# Patient Record
Sex: FEMALE | Race: WHITE | NOT HISPANIC OR LATINO | Employment: OTHER | URBAN - METROPOLITAN AREA
[De-identification: names, ages, dates, MRNs, and addresses within clinical notes are randomized per-mention and may not be internally consistent; named-entity substitution may affect disease eponyms.]

---

## 2017-03-17 RX ORDER — TIZANIDINE 2 MG/1
2-4 TABLET ORAL EVERY 6 HOURS
COMMUNITY
Start: 2017-01-24 | End: 2017-07-26 | Stop reason: HOSPADM

## 2017-03-17 RX ORDER — SUMATRIPTAN 50 MG/1
50 TABLET, FILM COATED ORAL
COMMUNITY
Start: 2017-01-24 | End: 2018-01-24

## 2017-03-17 RX ORDER — ONDANSETRON 4 MG/1
4 TABLET, ORALLY DISINTEGRATING ORAL EVERY 8 HOURS
COMMUNITY
Start: 2017-01-24 | End: 2019-09-12 | Stop reason: SDUPTHER

## 2017-03-17 RX ORDER — LEVOTHYROXINE SODIUM 0.07 MG/1
75 TABLET ORAL
COMMUNITY
Start: 2017-01-20 | End: 2019-02-14 | Stop reason: SDUPTHER

## 2017-03-17 RX ORDER — DEXTROAMPHETAMINE SACCHARATE, AMPHETAMINE ASPARTATE, DEXTROAMPHETAMINE SULFATE AND AMPHETAMINE SULFATE 7.5; 7.5; 7.5; 7.5 MG/1; MG/1; MG/1; MG/1
30 TABLET ORAL 2 TIMES DAILY
COMMUNITY
Start: 2017-02-21 | End: 2018-05-14 | Stop reason: SDUPTHER

## 2017-03-17 RX ORDER — CLONAZEPAM 1 MG/1
1 TABLET ORAL 3 TIMES DAILY
COMMUNITY
Start: 2017-02-21 | End: 2018-05-14 | Stop reason: SDUPTHER

## 2017-03-21 ENCOUNTER — OFFICE VISIT (OUTPATIENT)
Dept: NEUROSURGERY | Facility: CLINIC | Age: 32
End: 2017-03-21

## 2017-03-21 VITALS
SYSTOLIC BLOOD PRESSURE: 97 MMHG | DIASTOLIC BLOOD PRESSURE: 62 MMHG | BODY MASS INDEX: 24.8 KG/M2 | HEART RATE: 62 BPM | WEIGHT: 158 LBS | HEIGHT: 67 IN

## 2017-03-21 DIAGNOSIS — D32.0 BENIGN NEOPLASM OF CEREBRAL MENINGES (HCC): Primary | ICD-10-CM

## 2017-03-21 PROCEDURE — 99203 OFFICE O/P NEW LOW 30 MIN: CPT | Performed by: NEUROLOGICAL SURGERY

## 2017-03-21 RX ORDER — TOPIRAMATE 50 MG/1
TABLET, FILM COATED ORAL
Refills: 3 | COMMUNITY
Start: 2017-02-17 | End: 2019-09-17 | Stop reason: ALTCHOICE

## 2017-03-21 RX ORDER — METRONIDAZOLE 500 MG/1
TABLET ORAL
Refills: 0 | COMMUNITY
Start: 2017-01-26 | End: 2017-03-21 | Stop reason: ALTCHOICE

## 2017-03-21 NOTE — PROGRESS NOTES
Subjective   Patient ID: Desiree Peterson is a 31 y.o. female is being seen for consultation today at the request of Melani Pruitt,* for headaches and seizures.    History of Present Illness     This patient has a long history of headaches.  She was thought to be having migraines and as a result had some questionable seizures toward the end of January.  At that time she had an MRI of her brain.  Subsequent to that she has been on Topamax taking it just once a day and that has helped a lot.  Currently she has no real complaints at all except for some mild headaches.    The following portions of the patient's history were reviewed and updated as appropriate: allergies, current medications, past family history, past medical history, past social history, past surgical history and problem list.    Review of Systems   Constitutional: Positive for fatigue.   HENT: Positive for sinus pressure.    Respiratory: Negative for chest tightness and shortness of breath.    Cardiovascular: Negative for chest pain.   Gastrointestinal: Positive for diarrhea and nausea.   Endocrine: Positive for cold intolerance, heat intolerance and polydipsia.   Musculoskeletal: Positive for myalgias, neck pain and neck stiffness.   Allergic/Immunologic: Positive for environmental allergies and immunocompromised state.   Neurological: Positive for seizures, weakness and numbness.        Tingling   Hematological: Positive for adenopathy.   Psychiatric/Behavioral: Positive for confusion, decreased concentration, dysphoric mood and sleep disturbance. The patient is nervous/anxious.    All other systems reviewed and are negative.      Objective   Physical Exam   Constitutional: She is oriented to person, place, and time. She appears well-developed and well-nourished.   HENT:   Head: Normocephalic and atraumatic.   Eyes: Conjunctivae and EOM are normal. Pupils are equal, round, and reactive to light.   Fundoscopic exam:       The right eye  shows no papilledema. The right eye shows venous pulsations.        The left eye shows no papilledema. The left eye shows venous pulsations.   Neck: Carotid bruit is not present.   Neurological: She is oriented to person, place, and time. She has a normal Finger-Nose-Finger Test and a normal Heel to Shin Test. Gait normal.   Reflex Scores:       Tricep reflexes are 2+ on the right side and 2+ on the left side.       Bicep reflexes are 2+ on the right side and 2+ on the left side.       Brachioradialis reflexes are 2+ on the right side and 2+ on the left side.       Patellar reflexes are 2+ on the right side and 2+ on the left side.       Achilles reflexes are 2+ on the right side and 2+ on the left side.  Psychiatric: Her speech is normal.     Neurologic Exam     Mental Status   Oriented to person, place, and time.   Registration of memory: Good recent and remote memory.   Attention: normal. Concentration: normal.   Speech: speech is normal   Level of consciousness: alert  Knowledge: consistent with education.     Cranial Nerves     CN II   Visual fields full to confrontation.   Visual acuity: normal    CN III, IV, VI   Pupils are equal, round, and reactive to light.  Extraocular motions are normal.     CN V   Facial sensation intact.   Right corneal reflex: normal  Left corneal reflex: normal    CN VII   Facial expression full, symmetric.   Right facial weakness: none  Left facial weakness: none    CN VIII   Hearing: intact    CN IX, X   Palate: symmetric    CN XI   Right sternocleidomastoid strength: normal  Left sternocleidomastoid strength: normal    CN XII   Tongue: not atrophic  Tongue deviation: none    Motor Exam   Muscle bulk: normal  Right arm tone: normal  Left arm tone: normal  Right leg tone: normal  Left leg tone: normal    Strength   Strength 5/5 except as noted.     Sensory Exam   Light touch normal.     Gait, Coordination, and Reflexes     Gait  Gait: normal    Coordination   Finger to nose  coordination: normal  Heel to shin coordination: normal    Reflexes   Right brachioradialis: 2+  Left brachioradialis: 2+  Right biceps: 2+  Left biceps: 2+  Right triceps: 2+  Left triceps: 2+  Right patellar: 2+  Left patellar: 2+  Right achilles: 2+  Left achilles: 2+  Right : 2+  Left : 2+      Assessment/Plan   Independent Review of Radiographic Studies:      I reviewed an MRI of her brain done in January of this year myself.  This was done at [facility].  This does show a very small meningioma in the right lateral sphenoid wing.    Medical Decision Making:      I told the patient and her family about the MRI.  I suggested that we do a follow-up scan but I would not recommend anything else at this point.  The tumor is simply too small to consider any type of surgery.    Desiree was seen today for headache.    Diagnoses and all orders for this visit:    Benign neoplasm of cerebral meninges  -     MRI Brain With & Without Contrast; Future    Return for After radiology test.

## 2017-07-21 ENCOUNTER — HOSPITAL ENCOUNTER (OUTPATIENT)
Dept: MRI IMAGING | Facility: HOSPITAL | Age: 32
Discharge: HOME | End: 2017-07-21
Attending: NEUROLOGICAL SURGERY

## 2017-07-26 ENCOUNTER — HOSPITAL ENCOUNTER (OUTPATIENT)
Dept: MRI IMAGING | Facility: HOSPITAL | Age: 32
Discharge: HOME | End: 2017-07-26
Attending: NEUROLOGICAL SURGERY | Admitting: NEUROLOGICAL SURGERY

## 2017-07-26 ENCOUNTER — OFFICE VISIT (OUTPATIENT)
Dept: NEUROSURGERY | Facility: CLINIC | Age: 32
End: 2017-07-26

## 2017-07-26 VITALS
HEIGHT: 67 IN | DIASTOLIC BLOOD PRESSURE: 78 MMHG | HEART RATE: 77 BPM | WEIGHT: 150 LBS | SYSTOLIC BLOOD PRESSURE: 124 MMHG | BODY MASS INDEX: 23.54 KG/M2

## 2017-07-26 DIAGNOSIS — D32.0 BENIGN NEOPLASM OF CEREBRAL MENINGES (HCC): Primary | ICD-10-CM

## 2017-07-26 DIAGNOSIS — D32.0 BENIGN NEOPLASM OF CEREBRAL MENINGES (HCC): ICD-10-CM

## 2017-07-26 PROCEDURE — 99213 OFFICE O/P EST LOW 20 MIN: CPT | Performed by: NEUROLOGICAL SURGERY

## 2017-07-26 PROCEDURE — A9577 INJ MULTIHANCE: HCPCS | Performed by: NEUROLOGICAL SURGERY

## 2017-07-26 PROCEDURE — 0 GADOBENATE DIMEGLUMINE 529 MG/ML SOLUTION: Performed by: NEUROLOGICAL SURGERY

## 2017-07-26 PROCEDURE — 70553 MRI BRAIN STEM W/O & W/DYE: CPT

## 2017-07-26 RX ADMIN — GADOBENATE DIMEGLUMINE 13 ML: 529 INJECTION, SOLUTION INTRAVENOUS at 10:00

## 2017-07-26 NOTE — PROGRESS NOTES
Subjective   Patient ID: Desiree Peterson is a 31 y.o. female is here today for follow-up after brain MRI.     History of Present Illness    This patient returns today.  She is having increasingly severe migraines.  Otherwise she has no change in symptoms.    The following portions of the patient's history were reviewed and updated as appropriate: allergies, current medications, past family history, past medical history, past social history, past surgical history and problem list.    Review of Systems   Respiratory: Negative for shortness of breath.    Cardiovascular: Negative for chest pain.   Neurological: Positive for light-headedness and headaches.   All other systems reviewed and are negative.      Objective   Physical Exam   Constitutional: She is oriented to person, place, and time. She appears well-developed and well-nourished.   Neurological: She is oriented to person, place, and time.     Neurologic Exam     Mental Status   Oriented to person, place, and time.       Assessment/Plan   Independent Review of Radiographic Studies:      I reviewed her MRI that was done earlier today and I also talked to the radiologist.  This shows the tumor along the right lateral sphenoid wing.  It is still small and measures working or 15 mm.  Radiologist thought that it may have grown by millimeter or less it is not totally sure even about that    Medical Decision Making:      I told the patient that I thought it was safe to continue to image this every 6 months for right now.  I think we need to go again in about 6 months just to be sure there is no gross since was a question today.  If that looks okay then we might be able to stretch it out to once a year after that.    Desiree was seen today for headache.    Diagnoses and all orders for this visit:    Benign neoplasm of cerebral meninges  -     MRI Brain With & Without Contrast; Future    Return in about 6 months (around 1/26/2018).

## 2017-09-11 ENCOUNTER — APPOINTMENT (OUTPATIENT)
Dept: GENERAL RADIOLOGY | Facility: HOSPITAL | Age: 32
End: 2017-09-11

## 2017-09-11 ENCOUNTER — HOSPITAL ENCOUNTER (EMERGENCY)
Facility: HOSPITAL | Age: 32
Discharge: HOME | End: 2017-09-11
Attending: EMERGENCY MEDICINE | Admitting: EMERGENCY MEDICINE

## 2017-09-11 VITALS
HEART RATE: 57 BPM | HEIGHT: 67 IN | WEIGHT: 150 LBS | TEMPERATURE: 98 F | BODY MASS INDEX: 23.54 KG/M2 | OXYGEN SATURATION: 100 % | RESPIRATION RATE: 16 BRPM | SYSTOLIC BLOOD PRESSURE: 106 MMHG | DIASTOLIC BLOOD PRESSURE: 71 MMHG
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
## 2017-09-11 DIAGNOSIS — S20.229A BACK CONTUSION, UNSPECIFIED LATERALITY, INITIAL ENCOUNTER: ICD-10-CM

## 2017-09-11 LAB — HCG SERPL QL: NEGATIVE

## 2017-09-11 PROCEDURE — 72220 X-RAY EXAM SACRUM TAILBONE: CPT

## 2017-09-11 PROCEDURE — 84703 CHORIONIC GONADOTROPIN ASSAY: CPT | Performed by: EMERGENCY MEDICINE

## 2017-09-11 PROCEDURE — 99284 EMERGENCY DEPT VISIT MOD MDM: CPT

## 2017-09-11 PROCEDURE — 72072 X-RAY EXAM THORAC SPINE 3VWS: CPT

## 2017-09-11 PROCEDURE — 72110 X-RAY EXAM L-2 SPINE 4/>VWS: CPT

## 2017-09-11 PROCEDURE — 36415 COLL VENOUS BLD VENIPUNCTURE: CPT | Performed by: EMERGENCY MEDICINE

## 2017-09-11 RX ORDER — HYDROCODONE BITARTRATE AND ACETAMINOPHEN 7.5; 325 MG/1; MG/1
1 TABLET ORAL EVERY 6 HOURS PRN
Qty: 20 TABLET | Refills: 0 | Status: ON HOLD | OUTPATIENT
Start: 2017-09-11 | End: 2018-08-03

## 2017-09-11 RX ORDER — HYDROCODONE BITARTRATE AND ACETAMINOPHEN 7.5; 325 MG/1; MG/1
1 TABLET ORAL ONCE
Status: COMPLETED | OUTPATIENT
Start: 2017-09-11 | End: 2017-09-11

## 2017-09-11 RX ORDER — ONDANSETRON 4 MG/1
4 TABLET, ORALLY DISINTEGRATING ORAL EVERY 6 HOURS PRN
Status: DISCONTINUED | OUTPATIENT
Start: 2017-09-11 | End: 2017-09-11 | Stop reason: HOSPADM

## 2017-09-11 RX ORDER — VENLAFAXINE 37.5 MG/1
37.5 TABLET ORAL 2 TIMES DAILY
COMMUNITY
End: 2018-05-08 | Stop reason: SDUPTHER

## 2017-09-11 RX ADMIN — HYDROCODONE BITARTRATE AND ACETAMINOPHEN 1 TABLET: 7.5; 325 TABLET ORAL at 20:20

## 2017-09-11 RX ADMIN — ONDANSETRON 4 MG: 4 TABLET, ORALLY DISINTEGRATING ORAL at 20:20

## 2017-09-11 NOTE — ED PROVIDER NOTES
EMERGENCY DEPARTMENT ENCOUNTER    CHIEF COMPLAINT  Chief Complaint: Fall  History given by: Patient  History limited by: Nothing  Room Number: 01/01  PMD: Melani Pruitt MD      HPI:  Pt is a 31 y.o. female who presents to the ED after the patient slipped and fell down four stairs around 11:00AM and she reportedly landed on her lower back / tailbone upon impact. Patient reports that she simply lost her balance and denies experiencing any prodromal symptoms prior to the fall such as dizziness or lightheadedness. She also denies experiencing any head / neck trauma or LOC upon impact although she has developed mild cervical tenderness that has gradually worsened since sitting in the waiting room. Patient explains that the back pain is exacerbated with movement or sitting for long periods of time and alleviated with positional rest. Patient states that she's experienced mild tingling of her bilateral feet but she denies any nausea, vomiting, incontinence of bowel or bladder or focal weakness and she has no other complaints at this time.      Duration:  Seconds  Onset: Sudden  Timing: Constant  Location: Lumbar, coccyx  Radiation: None  Quality: Dull  Intensity/Severity: Moderate  Progression: No Change  Associated Symptoms: Back pain, neck pain, tingling BLE  Aggravating Factors: Movement, ambualting  Alleviating Factors: Rest  Previous Episodes: None  Treatment before arrival: None    PAST MEDICAL HISTORY  Active Ambulatory Problems     Diagnosis Date Noted   • Benign neoplasm of cerebral meninges 03/21/2017     Resolved Ambulatory Problems     Diagnosis Date Noted   • No Resolved Ambulatory Problems     Past Medical History:   Diagnosis Date   • Atrial flutter    • Cyst, sinus nasal    • Heart murmur    • Meningioma    • Migraines    • POTS (postural orthostatic tachycardia syndrome)    • Seizures        PAST SURGICAL HISTORY  Past Surgical History:   Procedure Laterality Date   • FINGER SURGERY      Cyst  removal       FAMILY HISTORY  Family History   Problem Relation Age of Onset   • No Known Problems Mother    • No Known Problems Father        SOCIAL HISTORY  Social History     Social History   • Marital status: Single     Spouse name: N/A   • Number of children: 1   • Years of education: College     Occupational History   • Unemployed      Social History Main Topics   • Smoking status: Former Smoker   • Smokeless tobacco: Not on file   • Alcohol use No   • Drug use: No   • Sexual activity: Defer     Other Topics Concern   • Not on file     Social History Narrative       ALLERGIES  Cefdinir and Penicillins    REVIEW OF SYSTEMS  Review of Systems   Constitutional: Negative for fever.   Respiratory: Negative for shortness of breath.    Cardiovascular: Negative for chest pain.   Musculoskeletal: Positive for back pain and neck pain.   Neurological:        Tingling BLE       PHYSICAL EXAM  ED Triage Vitals   Temp Heart Rate Resp BP SpO2   09/11/17 1512 09/11/17 1512 09/11/17 1512 09/11/17 1527 09/11/17 1512   97.5 °F (36.4 °C) 86 16 120/64 100 %      Temp src Heart Rate Source Patient Position BP Location FiO2 (%)   09/11/17 2001 09/11/17 2001 09/11/17 2001 09/11/17 2001 --   Oral Monitor Sitting Right arm        Physical Exam   Constitutional: She is oriented to person, place, and time. No distress.   HENT:   Head: Normocephalic and atraumatic.   Cardiovascular: Normal rate and regular rhythm.    Pulmonary/Chest: Effort normal. No respiratory distress.   Abdominal: There is no tenderness.   Musculoskeletal: She exhibits no tenderness.   Abrasions right lumbar and thoracic para-spinous areas. No thoracic vertebral tenderness.  Coccygeal tenderness noted on exam.    Neurological: She is alert and oriented to person, place, and time. She has normal reflexes. She exhibits normal muscle tone.   Skin: No rash noted.   Nursing note and vitals reviewed.      LAB RESULTS  Lab Results (last 24 hours)     Procedure Component  Value Units Date/Time    hCG, Serum, Qualitative [[id number]]  (Normal) Collected:  09/11/17 1550    Specimen:  Blood Updated:  09/11/17 1624     HCG Qualitative Negative          I ordered the above labs and reviewed the results    RADIOLOGY  XR Spine Thoracic 3 View   Final Result      XR Spine Lumbar 4+ View   Final Result      XR Sacrum & Coccyx   Final Result        2015  Reviewed XR T&L spine and Sacrum / Coccyx - Coccygeal deformity probably due to old injury.  Normal lumbar spine series. Mild thoracic scoliosis, mild compression of T8 question age. Independently viewed by me. Interpreted by radiologist.     I ordered the above noted radiological studies. Interpreted by radiologist. Reviewed by me in PACS.       PROCEDURES  Procedures      PROGRESS AND CONSULTS  ED Course     1513  Ordered hCG for further evaluation.     1900  Ordered XR T spine, Sacrum & Coccyx and L spine for further evaluation.     2037  Discussed case with  and he agrees with the treatment plan.     2038  Rechecked the patient and she is resting. Discussed the patient's pertinent labs and imaging results, including XR L spine shows nothing acute although there is an old appearing fracture of her tailbone and mild compression of T8. Discussed plan to discharge the patient home with a short course of Norco and recommended follow up with her PCP as directed. Patient agrees with the plan and all questions were addressed.     Latest vital signs   BP- 109/66 HR- 57 Temp- 98 °F (36.7 °C) (Oral) O2 sat- 100%      MEDICAL DECISION MAKING  Results were reviewed/discussed with the patient and they were also made aware of online access. Pt also made aware that some labs, such as cultures, will not be resulted during ER visit and follow up with PMD is necessary.     MDM  Number of Diagnoses or Management Options  Back contusion, unspecified laterality, initial encounter:   Fall, initial encounter:   Diagnosis management comments: Low suspicion  of compression fracture of T8.  No weakness, will have close follow-up.       Amount and/or Complexity of Data Reviewed  Clinical lab tests: reviewed and ordered (hCG negative)  Tests in the radiology section of CPT®: reviewed and ordered (XR T&L spine and Sacrum / Coccyx - Coccygeal deformity probably due to old injury.  Normal lumbar spine series. Mild thoracic scoliosis, mild compression of T8 question age.)    Patient Progress  Patient progress: stable         DIAGNOSIS  Final diagnoses:   Fall, initial encounter   Back contusion, unspecified laterality, initial encounter       DISPOSITION  DISCHARGE    Patient discharged in stable condition.    Reviewed implications of results, diagnosis, meds, responsibility to follow up, warning signs and symptoms of possible worsening, potential complications and reasons to return to ER, including worsening back pain.    Patient/Family voiced understanding of above instructions.    Discussed plan for discharge, as there is no emergent indication for admission.  Pt/family is agreeable and understands need for follow up and repeat testing.  Pt is aware that discharge does not mean that nothing is wrong but it indicates no emergency is present that requires admission and they must continue care with follow-up as given below or physician of their choice.     FOLLOW-UP  Melani Pruitt MD  [facility] [place] [id number] [phone]    In 1 week  for recheck of symptoms, if no improvement         Medication List      New Prescriptions          HYDROcodone-acetaminophen 7.5-325 MG per tablet   Commonly known as:  NORCO   Take 1 tablet by mouth Every 6 (Six) Hours As Needed for Severe Pain .           Latest Documented Vital Signs:  As of 8:53 PM  BP- 106/71 HR- 57 Temp- 98 °F (36.7 °C) (Oral) O2 sat- 100%    --  Documentation assistance provided by marcia Staley for Janak Pickering PA-C.  Information recorded by the marcia was done at my direction and has  been verified and validated by me.     Liam Staley  09/11/17 8011       AREN Garrido  09/11/17 5292

## 2017-09-12 NOTE — ED PROVIDER NOTES
I supervised care provided by the midlevel provider.    We have discussed this patient's history, physical exam, and treatment plan.   I have reviewed the note and personally saw and examined the patient and agree with the plan of care.    Pt with slip and fall on stairs at home. She reports pain of the mid-back, lumbar, and sacrum. Did not strike her head or have LOC.     On exam, there is mild scoliosis of the lower T-spine with abrasion to R of midline upper lumbar. No midline tenderness or deformity. No neuro deficit.    Imaging shows old coccygeal deformity, but no acute fx. Will discharge with pain medication. Instructed to alternate ice and heat therapy.    Imaging  XR Spine Thoracic 3 View   Final Result   XR Spine Lumbar 4+ View   Final Result   XR Sacrum & Coccyx   Final Result   Final result by Frankie Vanegas MD (09/11/17 20:10:45)     Narrative:     LUMBAR SPINE SERIES 5 VIEWS  SACRUM AND COCCYX 3 VIEWS  THORACIC SPINE SERIES 3 VIEWS     HISTORY: Emergency evaluation was performed in this 31-year-old female  who fell down a flight of stairs complains of thoracic and lumbar pain  and coccygeal pain. There is history of remote coccygeal fracture.     COMPARISON: None available     FINDINGS:  1. Coccygeal deformity probably due to old injury.  2. Normal lumbar spine series.  3. Mild thoracic scoliosis, mild compression of T8 question age.     MRI follow-up should be considered for more accurate assessment  chronology.     This report was finalized on 9/11/2017 8:10 PM by Dr. Frankie Vanegas MD.                    Documentation assistance provided by marcia Trinh for Dr. Salcido.  Information recorded by the scribe was done at my direction and has been verified and validated by me.         Andres Trinh  09/11/17 9600       Elvin Salcido MD  09/11/17 6850

## 2018-01-26 ENCOUNTER — HOSPITAL ENCOUNTER (OUTPATIENT)
Dept: MRI IMAGING | Facility: HOSPITAL | Age: 33
Discharge: HOME | End: 2018-01-26
Attending: NEUROLOGICAL SURGERY | Admitting: NEUROLOGICAL SURGERY

## 2018-01-26 DIAGNOSIS — D32.0 BENIGN NEOPLASM OF CEREBRAL MENINGES (HCC): ICD-10-CM

## 2018-01-26 PROCEDURE — A9577 INJ MULTIHANCE: HCPCS | Performed by: NEUROLOGICAL SURGERY

## 2018-01-26 PROCEDURE — 70553 MRI BRAIN STEM W/O & W/DYE: CPT

## 2018-01-26 PROCEDURE — 0 GADOBENATE DIMEGLUMINE 529 MG/ML SOLUTION: Performed by: NEUROLOGICAL SURGERY

## 2018-01-26 RX ADMIN — GADOBENATE DIMEGLUMINE 15 ML: 529 INJECTION, SOLUTION INTRAVENOUS at 13:24

## 2018-01-30 ENCOUNTER — TELEPHONE (OUTPATIENT)
Dept: NEUROSURGERY | Facility: CLINIC | Age: 33
End: 2018-01-30

## 2018-01-30 NOTE — TELEPHONE ENCOUNTER
Called and LVM for the patient to call the office as she had her MRI on 1/26/2018 however she cancelled her appointment for today via automated remind system.

## 2018-05-08 ENCOUNTER — TELEPHONE (OUTPATIENT)
Dept: FAMILY MEDICINE CLINIC | Facility: CLINIC | Age: 33
End: 2018-05-08

## 2018-05-08 RX ORDER — VENLAFAXINE 37.5 MG/1
37.5 TABLET ORAL DAILY
Qty: 30 TABLET | Refills: 6 | Status: SHIPPED | OUTPATIENT
Start: 2018-05-08 | End: 2018-12-03 | Stop reason: SDUPTHER

## 2018-05-08 NOTE — TELEPHONE ENCOUNTER
Patient informed she will have to be seen Monday before Adderall could be filled, may go to urgent care for sinuses if need now.

## 2018-05-08 NOTE — TELEPHONE ENCOUNTER
PT HAS AN APPOINTMENT WITH DR AGUILLON ON Monday, WHERE SHE WILL BE A NEW PT THOUGH SHE HAS SEEN HIM YEARS AGO.  SHE STATES THAT SHE HAS A SINUS INFECTION AND SHE HAS GONE A WHILE WITHOUT HER PRESCRIPTIONS FOR  ADDERALL, 30 MG  EFFEXOR, 37.5 Mg, 2 TIMES DAILY  AND SHE WAS ASKING IF EITHER DR AGUILLON COULD FILL THEM BEFORE SEEING HER, OR IF SHE COULD COME IN AND SEE SOMEONE ELSE BEFORE Monday, AND STILL HAVE DR AGUILLON AS HERE PCP AND SEE HIM ON Monday AS A NEW PATIENT?

## 2018-05-08 NOTE — TELEPHONE ENCOUNTER
Keep the appointment with me on Monday.  However, I cannot prescribe his medications until after you have been seen by me.

## 2018-05-14 ENCOUNTER — TELEPHONE (OUTPATIENT)
Dept: FAMILY MEDICINE CLINIC | Facility: CLINIC | Age: 33
End: 2018-05-14

## 2018-05-14 ENCOUNTER — OFFICE VISIT (OUTPATIENT)
Dept: FAMILY MEDICINE CLINIC | Facility: CLINIC | Age: 33
End: 2018-05-14

## 2018-05-14 VITALS
SYSTOLIC BLOOD PRESSURE: 104 MMHG | WEIGHT: 155 LBS | HEART RATE: 84 BPM | OXYGEN SATURATION: 100 % | HEIGHT: 67 IN | DIASTOLIC BLOOD PRESSURE: 60 MMHG | BODY MASS INDEX: 24.33 KG/M2 | TEMPERATURE: 98.4 F | RESPIRATION RATE: 18 BRPM

## 2018-05-14 DIAGNOSIS — E04.1 THYROID NODULE: ICD-10-CM

## 2018-05-14 DIAGNOSIS — D32.9 MENINGIOMA (HCC): ICD-10-CM

## 2018-05-14 DIAGNOSIS — F41.9 ANXIETY: ICD-10-CM

## 2018-05-14 DIAGNOSIS — F98.8 ATTENTION DEFICIT DISORDER, UNSPECIFIED HYPERACTIVITY PRESENCE: ICD-10-CM

## 2018-05-14 DIAGNOSIS — G40.909 SEIZURE DISORDER (HCC): ICD-10-CM

## 2018-05-14 DIAGNOSIS — E03.9 HYPOTHYROIDISM, UNSPECIFIED TYPE: Primary | ICD-10-CM

## 2018-05-14 PROCEDURE — 99204 OFFICE O/P NEW MOD 45 MIN: CPT | Performed by: FAMILY MEDICINE

## 2018-05-14 RX ORDER — DEXTROAMPHETAMINE SACCHARATE, AMPHETAMINE ASPARTATE, DEXTROAMPHETAMINE SULFATE AND AMPHETAMINE SULFATE 7.5; 7.5; 7.5; 7.5 MG/1; MG/1; MG/1; MG/1
30 TABLET ORAL 2 TIMES DAILY
Qty: 60 TABLET | Refills: 0 | Status: SHIPPED | OUTPATIENT
Start: 2018-05-14 | End: 2018-06-29 | Stop reason: SDUPTHER

## 2018-05-14 RX ORDER — CLONAZEPAM 1 MG/1
1 TABLET ORAL 3 TIMES DAILY PRN
Qty: 30 TABLET | Refills: 0 | Status: SHIPPED | OUTPATIENT
Start: 2018-05-14 | End: 2019-08-28 | Stop reason: SDUPTHER

## 2018-05-14 RX ORDER — DEXTROAMPHETAMINE SACCHARATE, AMPHETAMINE ASPARTATE, DEXTROAMPHETAMINE SULFATE AND AMPHETAMINE SULFATE 7.5; 7.5; 7.5; 7.5 MG/1; MG/1; MG/1; MG/1
30 TABLET ORAL 2 TIMES DAILY
Qty: 60 TABLET | Refills: 0 | Status: SHIPPED | OUTPATIENT
Start: 2018-05-14 | End: 2018-05-14 | Stop reason: SDUPTHER

## 2018-05-14 RX ORDER — TIZANIDINE 2 MG/1
2-4 TABLET ORAL
Status: ON HOLD | COMMUNITY
Start: 2017-12-08 | End: 2018-08-03 | Stop reason: SDDI

## 2018-05-14 NOTE — PROGRESS NOTES
SUBJECTIVE:  The patient is a 32-year-old white female is here for the first time in several years.  She had an insurance change and was seen another physician.  She is now able to come here.  She has a history of hypothyroidism and had lab recently by her previous physician.  There were in the normal range.  She is due an ultrasound.  She has a history of a meningioma which is being watched by neurosurgery.  She has ADD.  She has a history of seizures.    PAST MEDICAL HISTORY:  Reviewed.  Family history reviewed  Surgical history reviewed  She'll history reviewed    REVIEW OF SYSTEMS:  Please see above; 14 point ROS negative.      OBJECTIVE: Vitals signs are reviewed and are stable.    HEENT: PERRLA.   Neck:  Supple.   Lungs:  Clear.    Heart:  Regular rate and rhythm.   Abdomen:   Soft, nontender.   Extremities:  No cyanosis, clubbing or edema.     ASSESSMENT:  Meningioma  Seizure disorder  Hypothyroidism  Depression  Migraines  ADD      PLAN: Her medications will be refilled.  She will sign a contract.  An ultrasound of her thyroid is ordered.  She will follow up on these tests.  I will try to retrieve her labs from [facility].  He will call if problems.

## 2018-06-29 ENCOUNTER — TELEPHONE (OUTPATIENT)
Dept: FAMILY MEDICINE CLINIC | Facility: CLINIC | Age: 33
End: 2018-06-29

## 2018-06-29 RX ORDER — DEXTROAMPHETAMINE SACCHARATE, AMPHETAMINE ASPARTATE, DEXTROAMPHETAMINE SULFATE AND AMPHETAMINE SULFATE 7.5; 7.5; 7.5; 7.5 MG/1; MG/1; MG/1; MG/1
30 TABLET ORAL 2 TIMES DAILY
Qty: 60 TABLET | Refills: 0 | Status: SHIPPED | OUTPATIENT
Start: 2018-06-29 | End: 2018-06-29 | Stop reason: SDUPTHER

## 2018-06-29 RX ORDER — DEXTROAMPHETAMINE SACCHARATE, AMPHETAMINE ASPARTATE, DEXTROAMPHETAMINE SULFATE AND AMPHETAMINE SULFATE 7.5; 7.5; 7.5; 7.5 MG/1; MG/1; MG/1; MG/1
30 TABLET ORAL 2 TIMES DAILY
Qty: 60 TABLET | Refills: 0 | Status: SHIPPED | OUTPATIENT
Start: 2018-06-29 | End: 2018-07-03 | Stop reason: SDUPTHER

## 2018-07-03 ENCOUNTER — TELEPHONE (OUTPATIENT)
Dept: FAMILY MEDICINE CLINIC | Facility: CLINIC | Age: 33
End: 2018-07-03

## 2018-07-03 RX ORDER — DEXTROAMPHETAMINE SACCHARATE, AMPHETAMINE ASPARTATE, DEXTROAMPHETAMINE SULFATE AND AMPHETAMINE SULFATE 7.5; 7.5; 7.5; 7.5 MG/1; MG/1; MG/1; MG/1
30 TABLET ORAL 2 TIMES DAILY
Qty: 60 TABLET | Refills: 0 | Status: SHIPPED | OUTPATIENT
Start: 2018-07-03 | End: 2018-09-26 | Stop reason: SDUPTHER

## 2018-07-03 NOTE — TELEPHONE ENCOUNTER
Annette called from [facility] and said they don't have adderall 30 mg in stock, and wants to change it to 20 mg 1 1/2 tabs bid. Ok per Robin.

## 2018-08-01 ENCOUNTER — HOSPITAL ENCOUNTER (OUTPATIENT)
Facility: HOSPITAL | Age: 33
Setting detail: OBSERVATION
Discharge: HOME | End: 2018-08-03
Attending: EMERGENCY MEDICINE | Admitting: HOSPITALIST

## 2018-08-01 ENCOUNTER — APPOINTMENT (OUTPATIENT)
Dept: CT IMAGING | Facility: HOSPITAL | Age: 33
End: 2018-08-01

## 2018-08-01 DIAGNOSIS — G45.9 TRANSIENT CEREBRAL ISCHEMIA, UNSPECIFIED TYPE: Primary | ICD-10-CM

## 2018-08-01 LAB
ALBUMIN SERPL-MCNC: 4.8 G/DL (ref 3.5–5.2)
ALBUMIN/GLOB SERPL: 1.7 G/DL
ALP SERPL-CCNC: 58 U/L (ref 39–117)
ALT SERPL W P-5'-P-CCNC: 11 U/L (ref 1–33)
ANION GAP SERPL CALCULATED.3IONS-SCNC: 12.5 MMOL/L
AST SERPL-CCNC: 10 U/L (ref 1–32)
BASOPHILS # BLD AUTO: 0 10*3/MM3 (ref 0–0.2)
BASOPHILS NFR BLD AUTO: 0 % (ref 0–1.5)
BILIRUB SERPL-MCNC: 0.5 MG/DL (ref 0.1–1.2)
BUN BLD-MCNC: 9 MG/DL (ref 6–20)
BUN/CREAT SERPL: 10.8 (ref 7–25)
CALCIUM SPEC-SCNC: 9 MG/DL (ref 8.6–10.5)
CHLORIDE SERPL-SCNC: 103 MMOL/L (ref 98–107)
CO2 SERPL-SCNC: 23.5 MMOL/L (ref 22–29)
CREAT BLD-MCNC: 0.83 MG/DL (ref 0.57–1)
DEPRECATED RDW RBC AUTO: 39 FL (ref 37–54)
EOSINOPHIL # BLD AUTO: 0 10*3/MM3 (ref 0–0.7)
EOSINOPHIL NFR BLD AUTO: 0 % (ref 0.3–6.2)
ERYTHROCYTE [DISTWIDTH] IN BLOOD BY AUTOMATED COUNT: 12.8 % (ref 11.7–13)
GFR SERPL CREATININE-BSD FRML MDRD: 80 ML/MIN/1.73
GLOBULIN UR ELPH-MCNC: 2.9 GM/DL
GLUCOSE BLD-MCNC: 101 MG/DL (ref 65–99)
GLUCOSE BLDC GLUCOMTR-MCNC: 100 MG/DL (ref 70–130)
HCG SERPL QL: NEGATIVE
HCT VFR BLD AUTO: 41.4 % (ref 35.6–45.5)
HGB BLD-MCNC: 13.6 G/DL (ref 11.9–15.5)
HOLD SPECIMEN: NORMAL
HOLD SPECIMEN: NORMAL
IMM GRANULOCYTES # BLD: 0 10*3/MM3 (ref 0–0.03)
IMM GRANULOCYTES NFR BLD: 0 % (ref 0–0.5)
LYMPHOCYTES # BLD AUTO: 1.54 10*3/MM3 (ref 0.9–4.8)
LYMPHOCYTES NFR BLD AUTO: 31.6 % (ref 19.6–45.3)
MCH RBC QN AUTO: 27.9 PG (ref 26.9–32)
MCHC RBC AUTO-ENTMCNC: 32.9 G/DL (ref 32.4–36.3)
MCV RBC AUTO: 85 FL (ref 80.5–98.2)
MONOCYTES # BLD AUTO: 0.41 10*3/MM3 (ref 0.2–1.2)
MONOCYTES NFR BLD AUTO: 8.4 % (ref 5–12)
NEUTROPHILS # BLD AUTO: 2.93 10*3/MM3 (ref 1.9–8.1)
NEUTROPHILS NFR BLD AUTO: 60 % (ref 42.7–76)
PLATELET # BLD AUTO: 204 10*3/MM3 (ref 140–500)
PMV BLD AUTO: 9.4 FL (ref 6–12)
POTASSIUM BLD-SCNC: 3.4 MMOL/L (ref 3.5–5.2)
PROT SERPL-MCNC: 7.7 G/DL (ref 6–8.5)
RBC # BLD AUTO: 4.87 10*6/MM3 (ref 3.9–5.2)
SODIUM BLD-SCNC: 139 MMOL/L (ref 136–145)
WBC NRBC COR # BLD: 4.88 10*3/MM3 (ref 4.5–10.7)
WHOLE BLOOD HOLD SPECIMEN: NORMAL
WHOLE BLOOD HOLD SPECIMEN: NORMAL

## 2018-08-01 PROCEDURE — 82962 GLUCOSE BLOOD TEST: CPT

## 2018-08-01 PROCEDURE — 84703 CHORIONIC GONADOTROPIN ASSAY: CPT | Performed by: EMERGENCY MEDICINE

## 2018-08-01 PROCEDURE — 93010 ELECTROCARDIOGRAM REPORT: CPT | Performed by: INTERNAL MEDICINE

## 2018-08-01 PROCEDURE — 93005 ELECTROCARDIOGRAM TRACING: CPT | Performed by: EMERGENCY MEDICINE

## 2018-08-01 PROCEDURE — 99285 EMERGENCY DEPT VISIT HI MDM: CPT

## 2018-08-01 PROCEDURE — 80053 COMPREHEN METABOLIC PANEL: CPT | Performed by: EMERGENCY MEDICINE

## 2018-08-01 PROCEDURE — 70450 CT HEAD/BRAIN W/O DYE: CPT

## 2018-08-01 PROCEDURE — 85025 COMPLETE CBC W/AUTO DIFF WBC: CPT | Performed by: EMERGENCY MEDICINE

## 2018-08-01 RX ORDER — SODIUM CHLORIDE 0.9 % (FLUSH) 0.9 %
10 SYRINGE (ML) INJECTION AS NEEDED
Status: DISCONTINUED | OUTPATIENT
Start: 2018-08-01 | End: 2018-08-03 | Stop reason: HOSPADM

## 2018-08-01 RX ORDER — POTASSIUM CHLORIDE 750 MG/1
40 CAPSULE, EXTENDED RELEASE ORAL ONCE
Status: COMPLETED | OUTPATIENT
Start: 2018-08-01 | End: 2018-08-02

## 2018-08-02 ENCOUNTER — APPOINTMENT (OUTPATIENT)
Dept: MRI IMAGING | Facility: HOSPITAL | Age: 33
End: 2018-08-02
Attending: HOSPITALIST

## 2018-08-02 DIAGNOSIS — D32.9 MENINGIOMA (HCC): Primary | ICD-10-CM

## 2018-08-02 PROBLEM — G43.109 MIGRAINE AURA WITHOUT HEADACHE: Status: ACTIVE | Noted: 2018-08-02

## 2018-08-02 PROBLEM — G43.009 ATYPICAL MIGRAINE: Status: ACTIVE | Noted: 2018-08-02

## 2018-08-02 PROBLEM — E87.6 HYPOKALEMIA: Status: ACTIVE | Noted: 2018-08-02

## 2018-08-02 PROBLEM — R20.2 PARESTHESIA: Status: ACTIVE | Noted: 2018-08-02

## 2018-08-02 PROBLEM — M54.12 CERVICAL RADICULOPATHY: Status: ACTIVE | Noted: 2018-08-02

## 2018-08-02 PROBLEM — G45.9 TRANSIENT CEREBRAL ISCHEMIA: Status: ACTIVE | Noted: 2018-08-02

## 2018-08-02 PROBLEM — D32.0 CEREBRAL MENINGIOMA: Status: ACTIVE | Noted: 2018-08-02

## 2018-08-02 LAB
AMPHET+METHAMPHET UR QL: POSITIVE
BARBITURATES UR QL SCN: NEGATIVE
BENZODIAZ UR QL SCN: NEGATIVE
CANNABINOIDS SERPL QL: NEGATIVE
COCAINE UR QL: NEGATIVE
CRP SERPL-MCNC: <0.03 MG/DL (ref 0–0.5)
ERYTHROCYTE [SEDIMENTATION RATE] IN BLOOD: 8 MM/HR (ref 0–20)
MAGNESIUM SERPL-MCNC: 2.3 MG/DL (ref 1.6–2.6)
METHADONE UR QL SCN: NEGATIVE
OPIATES UR QL: NEGATIVE
OXYCODONE UR QL SCN: NEGATIVE
POTASSIUM BLD-SCNC: 3.5 MMOL/L (ref 3.5–5.2)
TSH SERPL DL<=0.05 MIU/L-ACNC: 2.44 MIU/ML (ref 0.27–4.2)

## 2018-08-02 PROCEDURE — 99243 OFF/OP CNSLTJ NEW/EST LOW 30: CPT | Performed by: PHYSICIAN ASSISTANT

## 2018-08-02 PROCEDURE — 80307 DRUG TEST PRSMV CHEM ANLYZR: CPT | Performed by: HOSPITALIST

## 2018-08-02 PROCEDURE — 84132 ASSAY OF SERUM POTASSIUM: CPT | Performed by: HOSPITALIST

## 2018-08-02 PROCEDURE — A9577 INJ MULTIHANCE: HCPCS | Performed by: INTERNAL MEDICINE

## 2018-08-02 PROCEDURE — 83735 ASSAY OF MAGNESIUM: CPT | Performed by: HOSPITALIST

## 2018-08-02 PROCEDURE — 86140 C-REACTIVE PROTEIN: CPT | Performed by: HOSPITALIST

## 2018-08-02 PROCEDURE — G0378 HOSPITAL OBSERVATION PER HR: HCPCS

## 2018-08-02 PROCEDURE — 99232 SBSQ HOSP IP/OBS MODERATE 35: CPT | Performed by: PSYCHIATRY & NEUROLOGY

## 2018-08-02 PROCEDURE — 85652 RBC SED RATE AUTOMATED: CPT | Performed by: HOSPITALIST

## 2018-08-02 PROCEDURE — 84443 ASSAY THYROID STIM HORMONE: CPT | Performed by: HOSPITALIST

## 2018-08-02 PROCEDURE — 70553 MRI BRAIN STEM W/O & W/DYE: CPT

## 2018-08-02 PROCEDURE — 72141 MRI NECK SPINE W/O DYE: CPT

## 2018-08-02 PROCEDURE — 0 GADOBENATE DIMEGLUMINE 529 MG/ML SOLUTION: Performed by: INTERNAL MEDICINE

## 2018-08-02 RX ORDER — ACETAMINOPHEN 325 MG/1
650 TABLET ORAL EVERY 4 HOURS PRN
Status: DISCONTINUED | OUTPATIENT
Start: 2018-08-02 | End: 2018-08-03 | Stop reason: HOSPADM

## 2018-08-02 RX ORDER — POTASSIUM CHLORIDE 1.5 G/1.77G
40 POWDER, FOR SOLUTION ORAL AS NEEDED
Status: DISCONTINUED | OUTPATIENT
Start: 2018-08-02 | End: 2018-08-03 | Stop reason: HOSPADM

## 2018-08-02 RX ORDER — CLONAZEPAM 1 MG/1
1 TABLET ORAL 3 TIMES DAILY PRN
Status: DISCONTINUED | OUTPATIENT
Start: 2018-08-02 | End: 2018-08-03 | Stop reason: HOSPADM

## 2018-08-02 RX ORDER — SODIUM CHLORIDE 0.9 % (FLUSH) 0.9 %
1-10 SYRINGE (ML) INJECTION AS NEEDED
Status: DISCONTINUED | OUTPATIENT
Start: 2018-08-02 | End: 2018-08-03 | Stop reason: HOSPADM

## 2018-08-02 RX ORDER — VENLAFAXINE 37.5 MG/1
37.5 TABLET ORAL DAILY
Status: DISCONTINUED | OUTPATIENT
Start: 2018-08-02 | End: 2018-08-03

## 2018-08-02 RX ORDER — TOPIRAMATE 100 MG/1
100 TABLET, FILM COATED ORAL 2 TIMES DAILY
Status: DISCONTINUED | OUTPATIENT
Start: 2018-08-02 | End: 2018-08-03 | Stop reason: HOSPADM

## 2018-08-02 RX ORDER — ONDANSETRON 4 MG/1
4 TABLET, ORALLY DISINTEGRATING ORAL EVERY 6 HOURS PRN
Status: DISCONTINUED | OUTPATIENT
Start: 2018-08-02 | End: 2018-08-03 | Stop reason: HOSPADM

## 2018-08-02 RX ORDER — POTASSIUM CHLORIDE 750 MG/1
40 CAPSULE, EXTENDED RELEASE ORAL AS NEEDED
Status: DISCONTINUED | OUTPATIENT
Start: 2018-08-02 | End: 2018-08-03 | Stop reason: HOSPADM

## 2018-08-02 RX ORDER — ASPIRIN 325 MG
325 TABLET ORAL ONCE
Status: COMPLETED | OUTPATIENT
Start: 2018-08-02 | End: 2018-08-02

## 2018-08-02 RX ORDER — ONDANSETRON 4 MG/1
4 TABLET, FILM COATED ORAL EVERY 6 HOURS PRN
Status: DISCONTINUED | OUTPATIENT
Start: 2018-08-02 | End: 2018-08-03 | Stop reason: HOSPADM

## 2018-08-02 RX ORDER — LEVOTHYROXINE SODIUM 0.07 MG/1
75 TABLET ORAL
Status: DISCONTINUED | OUTPATIENT
Start: 2018-08-02 | End: 2018-08-03 | Stop reason: HOSPADM

## 2018-08-02 RX ORDER — ONDANSETRON 2 MG/ML
4 INJECTION INTRAMUSCULAR; INTRAVENOUS EVERY 6 HOURS PRN
Status: DISCONTINUED | OUTPATIENT
Start: 2018-08-02 | End: 2018-08-03 | Stop reason: HOSPADM

## 2018-08-02 RX ADMIN — TOPIRAMATE 100 MG: 100 TABLET, FILM COATED ORAL at 10:16

## 2018-08-02 RX ADMIN — GADOBENATE DIMEGLUMINE 14 ML: 529 INJECTION, SOLUTION INTRAVENOUS at 09:30

## 2018-08-02 RX ADMIN — VENLAFAXINE HYDROCHLORIDE 37.5 MG: 37.5 TABLET ORAL at 10:15

## 2018-08-02 RX ADMIN — ACETAMINOPHEN 650 MG: 325 TABLET, FILM COATED ORAL at 15:17

## 2018-08-02 RX ADMIN — ASPIRIN 325 MG: 325 TABLET ORAL at 00:35

## 2018-08-02 RX ADMIN — POTASSIUM CHLORIDE 40 MEQ: 750 CAPSULE, EXTENDED RELEASE ORAL at 00:34

## 2018-08-02 RX ADMIN — TOPIRAMATE 100 MG: 100 TABLET, FILM COATED ORAL at 20:31

## 2018-08-02 RX ADMIN — LEVOTHYROXINE SODIUM 75 MCG: 75 TABLET ORAL at 05:32
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
## 2018-08-02 NOTE — ED PROVIDER NOTES
" EMERGENCY DEPARTMENT ENCOUNTER    CHIEF COMPLAINT  Chief Complaint: vision disturbance  History given by: patient  History limited by: none  Room Number: 21/21  PMD: Guy Kelly MD      HPI:  Pt is a 32 y.o. female who presents to the ED c/o a left eye vision disturbance that began while at work around 2000. She describes the sx as \"blurry and shaky\" that has since improved. She also c/o LUE being \"shaky\" as well. Pt felt numbness and tingling to the whole left side of her face at the beginning of sx (improved). She describes the vision as \"static on a TV\". Her vision sx have improved, but vision still remains blurred in the left eye. Pt has hx of migraines and reports that these sx are not similar. Denies HA or pain and extremity weakness. Pt has been ambulatory w/o difficulty since sx began. Pt is a smoker. She denies taking BC or any blood clotting disorders.  The numbness and tingling in the left side of her face as well as with tingling and abnormal feeling in her left arm have resolved.    Duration: since 2000  Onset: sudden  Timing: constant  Location: left eye  Radiation: n/a  Quality: 'blurry and shaky' 'like static on a TV'  Intensity/Severity: moderate  Progression: improved  Associated Symptoms: LUE shaking  Aggravating Factors: none  Alleviating Factors: none  Previous Episodes: denies  Treatment before arrival: none    PAST MEDICAL HISTORY  Active Ambulatory Problems     Diagnosis Date Noted   • Benign neoplasm of cerebral meninges (CMS/HCC) 03/21/2017     Resolved Ambulatory Problems     Diagnosis Date Noted   • No Resolved Ambulatory Problems     Past Medical History:   Diagnosis Date   • Atrial flutter (CMS/HCC)    • Cyst, sinus nasal    • Heart murmur    • Meningioma (CMS/HCC)    • Migraines    • POTS (postural orthostatic tachycardia syndrome)    • Seizures (CMS/HCC)        PAST SURGICAL HISTORY  Past Surgical History:   Procedure Laterality Date   • FINGER SURGERY      Cyst removal " "      FAMILY HISTORY  Family History   Problem Relation Age of Onset   • No Known Problems Mother    • No Known Problems Father        SOCIAL HISTORY  Social History     Social History   • Marital status: Single     Spouse name: N/A   • Number of children: 1   • Years of education: College     Occupational History   • Unemployed      Social History Main Topics   • Smoking status: Current Some Day Smoker     Types: Cigarettes   • Smokeless tobacco: Never Used   • Alcohol use No   • Drug use: No   • Sexual activity: Defer     Other Topics Concern   • Not on file     Social History Narrative   • No narrative on file       ALLERGIES  Cefdinir and Penicillins    REVIEW OF SYSTEMS  Review of Systems   Constitutional: Negative for fever.   HENT: Negative for sore throat.    Eyes: Positive for visual disturbance.   Respiratory: Negative for cough and shortness of breath.    Cardiovascular: Negative for chest pain.   Gastrointestinal: Negative for abdominal pain, diarrhea and vomiting.   Genitourinary: Negative for dysuria.   Musculoskeletal: Negative for neck pain.   Skin: Negative for rash.   Allergic/Immunologic: Negative.    Neurological: Negative for weakness, numbness and headaches.        \"shaking\" LUE   Hematological: Negative.    Psychiatric/Behavioral: Negative.    All other systems reviewed and are negative.      PHYSICAL EXAM  ED Triage Vitals   Temp Heart Rate Resp BP SpO2   08/01/18 2148 08/01/18 2148 08/01/18 2148 08/01/18 2157 08/01/18 2148   98.8 °F (37.1 °C) (!) 126 16 126/97 99 %      Temp src Heart Rate Source Patient Position BP Location FiO2 (%)   08/01/18 2148 08/01/18 2148 -- 08/01/18 2157 --   Oral Monitor  Right arm        Physical Exam   Constitutional: She is oriented to person, place, and time.   Eyes: Pupils are equal, round, and reactive to light. EOM are normal.   No gross visual field deficit. No APD. Disks were sharp. Pt is able to read the stroke scale chart at approximately 16 inches in " both eyes individually.  Visual 20/25 in the left eye. 20/15 in the right eye. Fundascopic was clear bilaterally. No hemorrhage.    Cardiovascular: Normal rate, regular rhythm and normal pulses.    Pulmonary/Chest: Effort normal and breath sounds normal. No respiratory distress.   Neurological: She is alert and oriented to person, place, and time. She has normal sensation, normal strength and intact cranial nerves. She displays normal stance. She shows no pronator drift.       LAB RESULTS  Lab Results (last 24 hours)     Procedure Component Value Units Date/Time    POC Glucose Once [[id number]]  (Normal) Collected:  08/01/18 2201    Specimen:  Blood Updated:  08/01/18 2203     Glucose 100 mg/dL     Narrative:       Meter: [id number] : [id number] Nancy Zaragoza    CBC & Differential [[id number]] Collected:  08/01/18 2213    Specimen:  Blood Updated:  08/01/18 2237    Narrative:       The following orders were created for panel order CBC & Differential.  Procedure                               Abnormality         Status                     ---------                               -----------         ------                     CBC Auto Differential[[id number]]        Abnormal            Final result                 Please view results for these tests on the individual orders.    Comprehensive Metabolic Panel [[id number]]  (Abnormal) Collected:  08/01/18 2213    Specimen:  Blood Updated:  08/01/18 2256     Glucose 101 (H) mg/dL      BUN 9 mg/dL      Creatinine 0.83 mg/dL      Sodium 139 mmol/L      Potassium 3.4 (L) mmol/L      Chloride 103 mmol/L      CO2 23.5 mmol/L      Calcium 9.0 mg/dL      Total Protein 7.7 g/dL      Albumin 4.80 g/dL      ALT (SGPT) 11 U/L      AST (SGOT) 10 U/L      Alkaline Phosphatase 58 U/L      Total Bilirubin 0.5 mg/dL      eGFR Non African Amer 80 mL/min/1.73      Globulin 2.9 gm/dL      A/G Ratio 1.7 g/dL      BUN/Creatinine Ratio 10.8     Anion Gap 12.5 mmol/L     CBC Auto  Differential [[id number]]  (Abnormal) Collected:  08/01/18 2213    Specimen:  Blood Updated:  08/01/18 2237     WBC 4.88 10*3/mm3      RBC 4.87 10*6/mm3      Hemoglobin 13.6 g/dL      Hematocrit 41.4 %      MCV 85.0 fL      MCH 27.9 pg      MCHC 32.9 g/dL      RDW 12.8 %      RDW-SD 39.0 fl      MPV 9.4 fL      Platelets 204 10*3/mm3      Neutrophil % 60.0 %      Lymphocyte % 31.6 %      Monocyte % 8.4 %      Eosinophil % 0.0 (L) %      Basophil % 0.0 %      Immature Grans % 0.0 %      Neutrophils, Absolute 2.93 10*3/mm3      Lymphocytes, Absolute 1.54 10*3/mm3      Monocytes, Absolute 0.41 10*3/mm3      Eosinophils, Absolute 0.00 10*3/mm3      Basophils, Absolute 0.00 10*3/mm3      Immature Grans, Absolute 0.00 10*3/mm3     hCG, Serum, Qualitative [[id number]]  (Normal) Collected:  08/01/18 2213    Specimen:  Blood Updated:  08/01/18 2333     HCG Qualitative Negative          I ordered the above labs and reviewed the results    RADIOLOGY  CT Head Without Contrast   Final Result   1. No acute intracranial abnormality.                           This report was finalized on 8/1/2018 11:58 PM by Samuel Robins M.D.               I ordered the above noted radiological studies. Interpreted by radiologist. Reviewed by me in PACS.       PROCEDURES  Procedures  NIH Stroke Scale      Interval: Baseline  Time: 10:12 PM  Person Administering Scale: Dr. Lazo  Administer stroke scale items in the order listed. Record performance in each category after each subscale exam. Do not go back and change scores. Follow directions provided for each exam technique. Scores should reflect what the patient does, not what the clinician thinks the patient can do. The clinician should record answers while administering the exam and work quickly. Except where indicated, the patient should not be coached (i.e., repeated requests to patient to make a special effort).      1a  Level of consciousness: 0=alert; keenly responsive   1b. LOC  questions:  0=Performs both tasks correctly   1c. LOC commands: 0=Performs both tasks correctly   2.  Best Gaze: 0=normal   3.  Visual: 0=No visual loss   4. Facial Palsy: 0=Normal symmetric movement   5a.  Motor left arm: 0=No drift, limb holds 90 (or 45) degrees for full 10 seconds   5b.  Motor right arm: 0=No drift, limb holds 90 (or 45) degrees for full 10 seconds   6a. motor left le=No drift, limb holds 90 (or 45) degrees for full 10 seconds   6b  Motor right le=No drift, limb holds 90 (or 45) degrees for full 10 seconds   7. Limb Ataxia: 0=Absent   8.  Sensory: 0=Normal; no sensory loss   9. Best Language:  0=No aphasia, normal   10. Dysarthria: 0=Normal   11. Extinction and Inattention: 0=No abnormality   12. Distal motor function: 0=Normal    Total:   0   EKG           EKG time: 2230  Rhythm/Rate: NSR 95  P waves and TN: 1st degree AV block  QRS, axis: narrow complex, nml QT interval.   ST and T waves: non specific  No acute changes     Interpreted Contemporaneously by me, independently viewed  unchanged compared to prior 2013      PROGRESS AND CONSULTS     2351  BP- 126/97 HR- 94 Temp- 98.8 °F (37.1 °C) (Oral) O2 sat- 99%  Rechecked the patient who is in NAD and is resting comfortably. Pt advised her sx have all resolved. Pt updated on waiting for CT head to be read.    0010  Rechecked the patient who is in NAD and is resting comfortably.Patient's symptoms have 100% resolved.  Discussed imaging showing nothing acute but concerned as she has not has these sx w/ her prior migraines. Questioned about family CVA hx. Pt's mother reports that multiple aunts and uncles have had cerebral aneurysms in their mid 30s. Pt told the plan to admit for stroke rule out. Pt understands and agrees with the plan, all questions answered.    0022  Discussed the pt with WOJCIECH Candelario. He agreed to admit.    0033  Rechecked the patient who is in NAD and is resting comfortably. Updated pt on the plan for admission and  to be evaluated. Pt understands and agrees with the plan, all questions answered.    MEDICAL DECISION MAKING  Results were reviewed/discussed with the patient and they were also made aware of online access. Pt also made aware that some labs, such as cultures, will not be resulted during ER visit and follow up with PMD is necessary.     MDM  Number of Diagnoses or Management Options     Amount and/or Complexity of Data Reviewed  Clinical lab tests: reviewed (Labs unremarkable)  Tests in the radiology section of CPT®: reviewed (CT head-negative)  Tests in the medicine section of CPT®: reviewed (See EKG procedure note.)  Decide to obtain previous medical records or to obtain history from someone other than the patient: yes (MRI brain 07/2017-Avidly enhancing mass consistent with a meningioma overlying the inferior lateral aspect of the right frontal lobe measuring approximately 15 x 11 x 6.5 mm in size. This is unchanged in size or appearance as compared to the MRI examination of 07/26/2017.)  Discuss the patient with other providers: yes (Dr. Christianson, [facility])  Independent visualization of images, tracings, or specimens: yes    Patient Progress  Patient progress: stable         DIAGNOSIS  Final diagnoses:   Transient cerebral ischemia, unspecified type       DISPOSITION  ADMISSION    Discussed treatment plan and reason for admission with pt/family and admitting physician.  Pt/family voiced understanding of the plan for admission for further testing/treatment as needed.     Latest Documented Vital Signs:  As of 12:34 AM  BP- 107/72 HR- 83 Temp- 98.8 °F (37.1 °C) (Oral) O2 sat- 99%    --  Documentation assistance provided by marcia New for Dr. Lazo.  Information recorded by the scribe was done at my direction and has been verified and validated by me.     Ayleen New  08/02/18 0034       Vivek Lazo MD  08/02/18 6322

## 2018-08-02 NOTE — CONSULTS
Inpatient Neurosurgery Consult  Consult performed by: VARUN DAWKINS.  Consult ordered by: PRABHU BERNARD  Reason for consult: transient facial numbness and blurry vision with meningioma  Assessment/Recommendations: Ms. Peterson is a 32-year-old female with past medical history of headaches who presented to the hospital yesterday with a transient episode of left facial numbness some blurry vision.  She states that it came on without any type of incident and lasted a few hours.  She states that her coworkers even described some facial drooping transiently she was subsequently admitted and underwent an very thorough evaluation including repeat brain MRI.  She is evaluated in the past by Dr. Coats for an incidental 16x6.5x11mm right frontal meningioma.  She was in the office 1 year ago and was due for a follow-up appointment in January/February but did not show up for her appointment.  However she did have her MRI in January which was stable.  She had a repeat brain MRI yesterday after presenting with the fascial numbness and tingling in blurry vision.  It is completely unchanged and other than the known right frontal meningioma is unremarkable.  Her symptoms have completely resolved.  She was evaluated by an allergy and I do agree with their interpretation that this was likely an atypical migraine.  She has been evaluated by Dr. Tomas Harrison in the past and I suggested that if she has any additional issues or concerns that she call for follow-up appointment.    In regards to the meningioma there is certainly no need for any intervention.  Her MRIs have been stable for a year and we will schedule a repeat brain MRI in 1 year.  She can certainly call sooner at any point with questions or concerns.          Patient Care Team:  Guy Kelly MD as PCP - General (Family Medicine)    Chief complaint:facial numbness and blurry vision    Subjective     Again I did review the brain MRI in July 2017 as well as January  2018 and the MRI from yesterday.  Please see the discussion above for the results.      ..Lab             08/01/18                       2213          WBC          4.88          HEMOGLOBIN   13.6          HEMATOCRIT   41.4          PLATELETS    204               ..Lab             08/02/18 08/01/18                       0550          2213          SODIUM        --          139           POTASSIUM    3.5          3.4*          CHLORIDE      --          103           CO2           --          23.5          BUN           --          9             CREATININE    --          0.83          GLUCOSE       --          101*          CALCIUM       --          9.0                   Review of Systems   All other systems reviewed and are negative.       Past Medical History:   Diagnosis Date   • Atrial flutter (CMS/HCC)    • Cyst, sinus nasal    • Heart murmur    • Meningioma (CMS/HCC)    • Migraines    • POTS (postural orthostatic tachycardia syndrome)    • Seizures (CMS/HCC)    ,   Past Surgical History:   Procedure Laterality Date   • FINGER SURGERY      Cyst removal   ,   Family History   Problem Relation Age of Onset   • No Known Problems Mother    • No Known Problems Father    ,   Social History   Substance Use Topics   • Smoking status: Current Some Day Smoker     Types: Cigarettes   • Smokeless tobacco: Never Used   • Alcohol use No   ,   Prescriptions Prior to Admission   Medication Sig Dispense Refill Last Dose   • amphetamine-dextroamphetamine (ADDERALL) 30 MG tablet Take 1 tablet by mouth 2 (Two) Times a Day. 60 tablet 0 8/1/2018 at Unknown time   • levothyroxine (SYNTHROID, LEVOTHROID) 75 MCG tablet Take 75 mcg by mouth.   Past Week at Unknown time   • topiramate (TOPAMAX) 50 MG tablet TAKE TWO TABLETS BY MOUTH TWICE A DAY  3 7/31/2018 at Unknown time   • venlafaxine (EFFEXOR) 37.5 MG tablet Take 1 tablet by mouth Daily. 30 tablet 6 7/31/2018 at Unknown time   • clonazePAM (KlonoPIN) 1 MG tablet Take 1 tablet by  mouth 3 (Three) Times a Day As Needed for Seizures. 30 tablet 0 More than a month at Unknown time   • HYDROcodone-acetaminophen (NORCO) 7.5-325 MG per tablet Take 1 tablet by mouth Every 6 (Six) Hours As Needed for Severe Pain . 20 tablet 0 Unknown at Unknown time   • ondansetron ODT (ZOFRAN-ODT) 4 MG disintegrating tablet Take 4 mg by mouth Every 8 (Eight) Hours.      • tiZANidine (ZANAFLEX) 2 MG tablet Take 2-4 mg by mouth.   Unknown at Unknown time   , Scheduled Meds:    levothyroxine 75 mcg Oral Q AM   topiramate 100 mg Oral BID   venlafaxine 37.5 mg Oral Daily   , Continuous Infusions:   , PRN Meds:  •  acetaminophen  •  clonazePAM  •  ondansetron **OR** ondansetron ODT **OR** ondansetron  •  pneumococcal polysaccharide 23-valent  •  potassium chloride  •  potassium chloride  •  sodium chloride  •  Insert peripheral IV **AND** sodium chloride and Allergies:  Cefdinir and Penicillins    Objective      Vital Signs  Temp:  [98 °F (36.7 °C)-98.8 °F (37.1 °C)] 98.2 °F (36.8 °C)  Heart Rate:  [] 83  Resp:  [16-18] 16  BP: ()/(45-97) 84/54    Physical Exam   Constitutional: She is oriented to person, place, and time. She appears well-developed and well-nourished.   HENT:   Head: Normocephalic and atraumatic.   Right Ear: External ear normal.   Left Ear: External ear normal.   Eyes: Pupils are equal, round, and reactive to light. Conjunctivae and EOM are normal. Right eye exhibits no discharge. Left eye exhibits no discharge.   Neck: Normal range of motion. Neck supple. No tracheal deviation present.   Pulmonary/Chest: Effort normal. No stridor. No respiratory distress.   Musculoskeletal: Normal range of motion. She exhibits no edema, tenderness or deformity.   Neurological: She is alert and oriented to person, place, and time. She has normal strength and normal reflexes. She displays no atrophy, no tremor and normal reflexes. No cranial nerve deficit or sensory deficit. She exhibits normal muscle tone. She  displays a negative Romberg sign. She displays no seizure activity. Coordination and gait normal.   No long tract signs   Skin: Skin is warm and dry.   Psychiatric: She has a normal mood and affect. Her behavior is normal. Judgment and thought content normal.   Nursing note and vitals reviewed.      Results Review:    I reviewed the patient's new clinical results.  I reviewed the patient's new imaging results and agree with the interpretation.        Assessment/Plan     Principal Problem:    Paresthesia  Active Problems:    Meningioma (CMS/HCC)    Atypical migraine    Hypokalemia    Migraine aura without headache    Cervical radiculopathy    Cerebral meningioma (CMS/HCC)      Assessment:  (Incidental stable right frontal meningioma  Transient facial numbness and tingling with blurry vision likely secondary to atypical migraine).     Plan:   (Again, we will see her in 1 year with a repeat brain MRI with and without contrast.  Our office will schedule the appt and call her. Call as needed in the interim. ).       I discussed the patients findings and my recommendations with patient    Jodi Corona PA-C  08/02/18  4:20 PM    Time: 30min

## 2018-08-02 NOTE — PROGRESS NOTES
Patient was seen this morning by my associate Dr Christianson  Chart reviewed  Neurosurgery and neurology has been consultative and consults are pending  She has a history of meningioma which is being followed as an outpatient by Dr Pauly Kulkarni MD

## 2018-08-02 NOTE — PLAN OF CARE
Problem: Patient Care Overview  Goal: Plan of Care Review  Outcome: Ongoing (interventions implemented as appropriate)   08/02/18 1810   Coping/Psychosocial   Plan of Care Reviewed With patient   Plan of Care Review   Progress improving   OTHER   Outcome Summary V/S stable, patient c/o pain from headach was able to control with the current pain regiem. Pt no longer complains of numbness and tingling. Will continue to monitor.     Goal: Individualization and Mutuality  Outcome: Ongoing (interventions implemented as appropriate)    Goal: Discharge Needs Assessment  Outcome: Ongoing (interventions implemented as appropriate)    Goal: Interprofessional Rounds/Family Conf  Outcome: Ongoing (interventions implemented as appropriate)      Problem: Stroke (Ischemic) (Adult)  Goal: Signs and Symptoms of Listed Potential Problems Will be Absent, Minimized or Managed (Stroke)  Outcome: Ongoing (interventions implemented as appropriate)
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
## 2018-08-02 NOTE — PLAN OF CARE
Problem: Patient Care Overview  Goal: Plan of Care Review   08/02/18 0349   Coping/Psychosocial   Plan of Care Reviewed With patient   Plan of Care Review   Progress improving   OTHER   Outcome Summary Patient admitted to  for paresthesia. VSS. Pt still c/o of slight migraine and numbness on her L face and foot. NIH 0. MRI of brain and neck to be done this AM. No signs of acute distress noted. Will cont to monitor.     Goal: Individualization and Mutuality  Outcome: Ongoing (interventions implemented as appropriate)    Goal: Discharge Needs Assessment  Outcome: Ongoing (interventions implemented as appropriate)    Goal: Interprofessional Rounds/Family Conf  Outcome: Ongoing (interventions implemented as appropriate)      Problem: Stroke (Ischemic) (Adult)  Goal: Signs and Symptoms of Listed Potential Problems Will be Absent, Minimized or Managed (Stroke)  Outcome: Ongoing (interventions implemented as appropriate)

## 2018-08-02 NOTE — PROGRESS NOTES
Jodi Corona, Cher Perez, BLANK Kwon-     Will you schedule her for a rpt brain MRI with and without contrast and follow up with Dr. Coats in one year and then call her with that appt info?     Thanks, Jodi      Order placed, and appointment made. Appointment reminder mailed to patient.

## 2018-08-02 NOTE — CONSULTS
Patient Identification:  NAME:  Desiree Peterson  Age:  32 y.o.   Sex:  female   :  1985   MRN:  [id number]       Chief complaint: My vision got blurred on the left then I got numb, reason for consult numbness and vision change    History of present illness:  This patient is a 32-year-old right-handed white female to previous history of known right frontal meningioma history of migraine headaches history of atrial flutter and possible seizure disorder who comes to the hospital after she developed blurred vision in the left eye lasted more than a few minutes and then seemed to spread to the left face with some sort of numbness and then eventually left face arm and leg numbness the whole thing lasted less than 2 hours was not associated with headache context the patient who does get migraine headaches and is had blurred vision although not like this before again it started out with the blurred vision in the left eye are off to the left followed by the numbness which all resolved.  Duration is noted quality is described location is noted modifying factors none she is absolutely back to normal now she's had an MRI scan that shows the known meningioma in the right frontal region without any change and without cerebral edema MRI of the neck shows mild cervical spine disease but no cord abnormality or evidence of myelopathy.      Past medical history:  Past Medical History:   Diagnosis Date   • Atrial flutter (CMS/HCC)    • Cyst, sinus nasal    • Heart murmur    • Meningioma (CMS/HCC)    • Migraines    • POTS (postural orthostatic tachycardia syndrome)    • Seizures (CMS/HCC)        Past surgical history:  Past Surgical History:   Procedure Laterality Date   • FINGER SURGERY      Cyst removal       Allergies:  Cefdinir and Penicillins    Home medications:  Prescriptions Prior to Admission   Medication Sig Dispense Refill Last Dose   • amphetamine-dextroamphetamine (ADDERALL) 30 MG tablet Take 1 tablet by  mouth 2 (Two) Times a Day. 60 tablet 0 8/1/2018 at Unknown time   • levothyroxine (SYNTHROID, LEVOTHROID) 75 MCG tablet Take 75 mcg by mouth.   Past Week at Unknown time   • topiramate (TOPAMAX) 50 MG tablet TAKE TWO TABLETS BY MOUTH TWICE A DAY  3 7/31/2018 at Unknown time   • venlafaxine (EFFEXOR) 37.5 MG tablet Take 1 tablet by mouth Daily. 30 tablet 6 7/31/2018 at Unknown time   • clonazePAM (KlonoPIN) 1 MG tablet Take 1 tablet by mouth 3 (Three) Times a Day As Needed for Seizures. 30 tablet 0 More than a month at Unknown time   • HYDROcodone-acetaminophen (NORCO) 7.5-325 MG per tablet Take 1 tablet by mouth Every 6 (Six) Hours As Needed for Severe Pain . 20 tablet 0 Unknown at Unknown time   • ondansetron ODT (ZOFRAN-ODT) 4 MG disintegrating tablet Take 4 mg by mouth Every 8 (Eight) Hours.      • tiZANidine (ZANAFLEX) 2 MG tablet Take 2-4 mg by mouth.   Unknown at Unknown time        Hospital medications:    levothyroxine 75 mcg Oral Q AM   topiramate 100 mg Oral BID   venlafaxine 37.5 mg Oral Daily        •  acetaminophen  •  clonazePAM  •  ondansetron **OR** ondansetron ODT **OR** ondansetron  •  pneumococcal polysaccharide 23-valent  •  potassium chloride  •  potassium chloride  •  sodium chloride  •  Insert peripheral IV **AND** sodium chloride    Family history:  Family History   Problem Relation Age of Onset   • No Known Problems Mother    • No Known Problems Father        Social history:  Social History   Substance Use Topics   • Smoking status: Current Some Day Smoker     Types: Cigarettes   • Smokeless tobacco: Never Used   • Alcohol use No       Review of systems:    No headache eyes ears nose throat skin bone joint  lymphatic hematologic or oncologic complaints no chest pain abdominal pain bowel bladder incontinence fever chills or rash she has had migraine headaches as she calls them previously in her life but nothing with this type of numbness or vision loss although she has had spots in her  vision previously with headaches    Objective:  Vitals Ranges:   Temp:  [98 °F (36.7 °C)-98.8 °F (37.1 °C)] 98.2 °F (36.8 °C)  Heart Rate:  [] 83  Resp:  [16-18] 16  BP: ()/(45-97) 84/54      Physical Exam:  Covered in tattoos awake alert oriented ×3 in no distress fund of knowledge attention span and concentration recent remote memory and language is normal well-developed well-nourished in no distress pupils 3 constricting to 2 normal disc retinas visual fields extraocular movements full without nystagmus nasolabial folds palate tongue symmetrical normal hearing facial sensation head turning shoulder shrug motor 5 of 5 times for extremities no atrophy fasciculations rigidity bradykinesia resting tremor reflexes trace throughout symmetrical toes downgoing bilaterally sensation normal light touch face both arms and both legs coordination normal in the upper extremity station and gait is normal no ataxia heart regular without murmur neck supple without bruits extremities no clubbing cyanosis or edema visual acuity normal at 3 feet    Results review:   I reviewed the patient's new clinical results.    Data review:  Lab Results (last 24 hours)     Procedure Component Value Units Date/Time    Sedimentation Rate [[id number]]  (Normal) Collected:  08/02/18 0550    Specimen:  Blood Updated:  08/02/18 0744     Sed Rate 8 mm/hr     C-reactive Protein [[id number]]  (Normal) Collected:  08/02/18 0550    Specimen:  Blood Updated:  08/02/18 0658     C-Reactive Protein <0.03 mg/dL     TSH [[id number]]  (Normal) Collected:  08/02/18 0550    Specimen:  Blood Updated:  08/02/18 0655     TSH 2.440 mIU/mL     Potassium [[id number]]  (Normal) Collected:  08/02/18 0550    Specimen:  Blood Updated:  08/02/18 0644     Potassium 3.5 mmol/L     Magnesium [[id number]]  (Normal) Collected:  08/02/18 0550    Specimen:  Blood Updated:  08/02/18 0644     Magnesium 2.3 mg/dL     Urine Drug Screen - Urine, Clean Catch [[id number]]  (Abnormal)  Collected:  08/02/18 0438    Specimen:  Urine from Urine, Clean Catch Updated:  08/02/18 0609     Amphet/Methamphet, Screen Positive (A)     Barbiturates Screen, Urine Negative     Benzodiazepine Screen, Urine Negative     Cocaine Screen, Urine Negative     Opiate Screen Negative     THC, Screen, Urine Negative     Methadone Screen, Urine Negative     Oxycodone Screen, Urine Negative    Narrative:       Negative Thresholds For Drugs Screened:     Amphetamines               500 ng/ml   Barbiturates               200 ng/ml   Benzodiazepines            100 ng/ml   Cocaine                    300 ng/ml   Methadone                  300 ng/ml   Opiates                    300 ng/ml   Oxycodone                  100 ng/ml   THC                        50 ng/ml    The Normal Value for all drugs tested is negative. This report includes final unconfirmed screening results to be used for medical treatment purposes only. Unconfirmed results must not be used for non-medical purposes such as employment or legal testing. Clinical consideration should be applied to any drug of abuse test, particulary when unconfirmed results are used.    hCG, Serum, Qualitative [[id number]]  (Normal) Collected:  08/01/18 2213    Specimen:  Blood Updated:  08/01/18 2333     HCG Qualitative Negative    [place] [facility] [[id number]] Collected:  08/01/18 2213    Specimen:  Blood Updated:  08/01/18 2315    Narrative:       The following orders were created for panel order [place] [facility].  Procedure                               Abnormality         Status                     ---------                               -----------         ------                     Light Blue Top[[id number]]                                   Final result               Green Top (Gel)[[id number]]                                  Final result               Lavender Top[[id number]]                                     Final result               Gold Top - SST[[id number]]                                    Final result                 Please view results for these tests on the individual orders.    Light Blue Top [[id number]] Collected:  08/01/18 2213    Specimen:  Blood Updated:  08/01/18 2315     Extra Tube hold for add-on     Comment: Auto resulted       Green Top (Gel) [[id number]] Collected:  08/01/18 2213    Specimen:  Blood Updated:  08/01/18 2315     Extra Tube Hold for add-ons.     Comment: Auto resulted.       Lavender Top [[id number]] Collected:  08/01/18 2213    Specimen:  Blood Updated:  08/01/18 2315     Extra Tube hold for add-on     Comment: Auto resulted       Gold Top - SST [[id number]] Collected:  08/01/18 2213    Specimen:  Blood Updated:  08/01/18 2315     Extra Tube Hold for add-ons.     Comment: Auto resulted.       Comprehensive Metabolic Panel [[id number]]  (Abnormal) Collected:  08/01/18 2213    Specimen:  Blood Updated:  08/01/18 2256     Glucose 101 (H) mg/dL      BUN 9 mg/dL      Creatinine 0.83 mg/dL      Sodium 139 mmol/L      Potassium 3.4 (L) mmol/L      Chloride 103 mmol/L      CO2 23.5 mmol/L      Calcium 9.0 mg/dL      Total Protein 7.7 g/dL      Albumin 4.80 g/dL      ALT (SGPT) 11 U/L      AST (SGOT) 10 U/L      Alkaline Phosphatase 58 U/L      Total Bilirubin 0.5 mg/dL      eGFR Non African Amer 80 mL/min/1.73      Globulin 2.9 gm/dL      A/G Ratio 1.7 g/dL      BUN/Creatinine Ratio 10.8     Anion Gap 12.5 mmol/L     CBC & Differential [[id number]] Collected:  08/01/18 2213    Specimen:  Blood Updated:  08/01/18 2237    Narrative:       The following orders were created for panel order CBC & Differential.  Procedure                               Abnormality         Status                     ---------                               -----------         ------                     CBC Auto Differential[[id number]]        Abnormal            Final result                 Please view results for these tests on the individual orders.    CBC Auto Differential [[id number]]  (Abnormal)  Collected:  08/01/18 2213    Specimen:  Blood Updated:  08/01/18 2237     WBC 4.88 10*3/mm3      RBC 4.87 10*6/mm3      Hemoglobin 13.6 g/dL      Hematocrit 41.4 %      MCV 85.0 fL      MCH 27.9 pg      MCHC 32.9 g/dL      RDW 12.8 %      RDW-SD 39.0 fl      MPV 9.4 fL      Platelets 204 10*3/mm3      Neutrophil % 60.0 %      Lymphocyte % 31.6 %      Monocyte % 8.4 %      Eosinophil % 0.0 (L) %      Basophil % 0.0 %      Immature Grans % 0.0 %      Neutrophils, Absolute 2.93 10*3/mm3      Lymphocytes, Absolute 1.54 10*3/mm3      Monocytes, Absolute 0.41 10*3/mm3      Eosinophils, Absolute 0.00 10*3/mm3      Basophils, Absolute 0.00 10*3/mm3      Immature Grans, Absolute 0.00 10*3/mm3     POC Glucose Once [[id number]]  (Normal) Collected:  08/01/18 2201    Specimen:  Blood Updated:  08/01/18 2203     Glucose 100 mg/dL     Narrative:       Meter: [id number] : [id number] [facility] Rosenberg [facility]           Imaging:  Imaging Results (last 24 hours)     Procedure Component Value Units Date/Time    MRI Brain With & Without Contrast [[id number]] Collected:  08/02/18 1054     Updated:  08/02/18 1121    Narrative:       MRI CERVICAL SPINE WITHOUT CONTRAST, MRI EXAMINATION OF THE BRAIN WITH  AND WITHOUT CONTRAST     HISTORY: Pain, left radiculopathy. Paresthesias. Meningioma.     COMPARISON: CT head 08/01/2018 and MRI brain 08/01/2018. No prior MRI  examination of the cervical spine is available for comparison.     MRI EXAMINATION OF THE CERVICAL SPINE WITHOUT CONTRAST:     A noncontrasted MRI examination of the cervical spine was performed.     FINDINGS: The alignment of the cervical spine is within normal limits.  There is mild disc desiccation at C3-4 and C4-5. Signal intensity within  the cervical cord is normal on the sagittal T2 sequence.     C2-3: There is no evidence of disc bulge or herniation.     C3-4: There is no evidence of disc bulge or herniation.     C4-5: A left paracentral disc osteophyte complex is present which  abuts  and mildly flattens the anterolateral aspect of the cord to the left of  midline.     C5-6: There is no evidence of central canal stenosis or of neural  foraminal compromise.     C6-7: There is a small left paracentral disc bulge or protrusion  appreciated which is and 8  FINDINGS transverse and cord.     C7-T1: There is no evidence of disc bulge or herniation.               Impression:       There is a left paracentral discussed by complex present at  C4-5 resulting in mild flattening of the ventral surface cord to the  left. A small left paracentral disc bulge or protrusion is present at  C6-7 minimally abutting the cord. There is no evidence of neural  foraminal, as. See above.     MRI examination of brain with and without contrast:     FINDINGS: There is no evidence of restricted diffusion to suggest acute  infarction. The brain ventricles are symmetrical. There is expected  flow-void in the basilar artery and in the distal aspect of the internal  carotid arteries bilaterally on the axial T2 sequence.     After contrast administration and extra-axial enhancing mass consistent  with meningiomas appear was appreciated overlying the lateral aspect of  the right frontal lobe measuring approximately 16 mm in AP dimension,  6.5 mm in the transverse dimension and approximately 11 to 12 mm in  craniocaudal dimension, unchanged. No other meningiomas are identified.     IMPRESSION: No evidence of infarction, hydrocephalus, abnormal  extra-axial fluid or of an intra-axial mass. A meningioma overlying the  lateral as a right frontal lobe is appreciated measuring approximately  16 x 6.5 x 11 mm in size is similar in appearance as compared to prior  examination.       MRI Cervical Spine Without Contrast [[id number]] Collected:  08/02/18 1054     Updated:  08/02/18 1121    Narrative:       MRI CERVICAL SPINE WITHOUT CONTRAST, MRI EXAMINATION OF THE BRAIN WITH  AND WITHOUT CONTRAST     HISTORY: Pain, left radiculopathy.  Paresthesias. Meningioma.     COMPARISON: CT head 08/01/2018 and MRI brain 08/01/2018. No prior MRI  examination of the cervical spine is available for comparison.     MRI EXAMINATION OF THE CERVICAL SPINE WITHOUT CONTRAST:     A noncontrasted MRI examination of the cervical spine was performed.     FINDINGS: The alignment of the cervical spine is within normal limits.  There is mild disc desiccation at C3-4 and C4-5. Signal intensity within  the cervical cord is normal on the sagittal T2 sequence.     C2-3: There is no evidence of disc bulge or herniation.     C3-4: There is no evidence of disc bulge or herniation.     C4-5: A left paracentral disc osteophyte complex is present which abuts  and mildly flattens the anterolateral aspect of the cord to the left of  midline.     C5-6: There is no evidence of central canal stenosis or of neural  foraminal compromise.     C6-7: There is a small left paracentral disc bulge or protrusion  appreciated which is and 8  FINDINGS transverse and cord.     C7-T1: There is no evidence of disc bulge or herniation.               Impression:       There is a left paracentral discussed by complex present at  C4-5 resulting in mild flattening of the ventral surface cord to the  left. A small left paracentral disc bulge or protrusion is present at  C6-7 minimally abutting the cord. There is no evidence of neural  foraminal, as. See above.     MRI examination of brain with and without contrast:     FINDINGS: There is no evidence of restricted diffusion to suggest acute  infarction. The brain ventricles are symmetrical. There is expected  flow-void in the basilar artery and in the distal aspect of the internal  carotid arteries bilaterally on the axial T2 sequence.     After contrast administration and extra-axial enhancing mass consistent  with meningiomas appear was appreciated overlying the lateral aspect of  the right frontal lobe measuring approximately 16 mm in AP dimension,  6.5 mm  in the transverse dimension and approximately 11 to 12 mm in  craniocaudal dimension, unchanged. No other meningiomas are identified.     IMPRESSION: No evidence of infarction, hydrocephalus, abnormal  extra-axial fluid or of an intra-axial mass. A meningioma overlying the  lateral as a right frontal lobe is appreciated measuring approximately  16 x 6.5 x 11 mm in size is similar in appearance as compared to prior  examination.       CT Head Without Contrast [[id number]] Collected:  08/01/18 2354     Updated:  08/02/18 0001    Narrative:       CRANIAL CT SCAN WITHOUT CONTRAST     CLINICAL HISTORY: visual change with face and arm tingling     COMPARISON: January 30, 2017, outside study.     TECHNIQUE: Radiation dose reduction techniques were utilized, including  automated exposure control and exposure modulation based on body size.  Multiple axial images of the head were obtained without contrast.      FINDINGS:  There are no abnormal areas of increased density or mass  effect.      Ventricles, sulci, and cisterns appear normal. Bone window  images are unremarkable.     The patient has a known right frontal region extra-axial mass lesion  demonstrated on previous MRI exams. This lesion is not well seen by  noncontrast CT. Outpatient MRI follow-up is therefore suggested          Impression:       1. No acute intracranial abnormality.                     This report was finalized on 8/1/2018 11:58 PM by Samuel Robins M.D.                Assessment and Plan:     This patient has suffered a variant migraine with blurred vision to the left followed by numbness to left face arm and leg,, not respecting any true vascular territory.  At this point all symptoms have resolved.  Although I have reviewed the MRI scan and it shows evidence of the right frontal meningioma this is not a seizure nor stroke nor a TIA syndrome.  She does have some left cervical radiculopathy but at this point her symptoms of left vision changes  followed by left face arm and leg numbness are not related to the left cervical spine  She does have a history highly suggestive of classic migraine and this would be a variant migraine.  I would not recommend any further neurologic workup and she is okay to go home and wants to do so.  She knows to follow-up with outpatient physicians for further follow-up imaging regarding the known meningioma,, which at this time shows no change in size no edema and is not responsible for her current symptoms.  Thanks      Mo Stanley MD  08/02/18  3:37 PM

## 2018-08-03 VITALS
DIASTOLIC BLOOD PRESSURE: 63 MMHG | HEIGHT: 67 IN | BODY MASS INDEX: 23.7 KG/M2 | SYSTOLIC BLOOD PRESSURE: 101 MMHG | HEART RATE: 63 BPM | WEIGHT: 151 LBS | TEMPERATURE: 98.2 F | OXYGEN SATURATION: 98 % | RESPIRATION RATE: 16 BRPM

## 2018-08-03 LAB
ANION GAP SERPL CALCULATED.3IONS-SCNC: 14.1 MMOL/L
BUN BLD-MCNC: 15 MG/DL (ref 6–20)
BUN/CREAT SERPL: 22.4 (ref 7–25)
CALCIUM SPEC-SCNC: 8.6 MG/DL (ref 8.6–10.5)
CHLORIDE SERPL-SCNC: 106 MMOL/L (ref 98–107)
CO2 SERPL-SCNC: 20.9 MMOL/L (ref 22–29)
CREAT BLD-MCNC: 0.67 MG/DL (ref 0.57–1)
GFR SERPL CREATININE-BSD FRML MDRD: 103 ML/MIN/1.73
GLUCOSE BLD-MCNC: 100 MG/DL (ref 65–99)
POTASSIUM BLD-SCNC: 3.9 MMOL/L (ref 3.5–5.2)
SODIUM BLD-SCNC: 141 MMOL/L (ref 136–145)

## 2018-08-03 PROCEDURE — 80048 BASIC METABOLIC PNL TOTAL CA: CPT | Performed by: INTERNAL MEDICINE

## 2018-08-03 PROCEDURE — 25010000002 PNEUMOCOCCAL VAC POLYVALENT PER 0.5 ML: Performed by: INTERNAL MEDICINE

## 2018-08-03 PROCEDURE — G0009 ADMIN PNEUMOCOCCAL VACCINE: HCPCS | Performed by: INTERNAL MEDICINE

## 2018-08-03 PROCEDURE — 90732 PPSV23 VACC 2 YRS+ SUBQ/IM: CPT | Performed by: INTERNAL MEDICINE

## 2018-08-03 PROCEDURE — G0378 HOSPITAL OBSERVATION PER HR: HCPCS

## 2018-08-03 RX ORDER — VENLAFAXINE 37.5 MG/1
37.5 TABLET ORAL NIGHTLY
Status: DISCONTINUED | OUTPATIENT
Start: 2018-08-03 | End: 2018-08-03 | Stop reason: HOSPADM

## 2018-08-03 RX ADMIN — PNEUMOCOCCAL VACCINE POLYVALENT 0.5 ML
25; 25; 25; 25; 25; 25; 25; 25; 25; 25; 25; 25; 25; 25; 25; 25; 25; 25; 25; 25; 25; 25; 25 INJECTION, SOLUTION INTRAMUSCULAR; SUBCUTANEOUS at 10:45

## 2018-08-03 RX ADMIN — LEVOTHYROXINE SODIUM 75 MCG: 75 TABLET ORAL at 06:10

## 2018-08-03 NOTE — PLAN OF CARE
Problem: Patient Care Overview  Goal: Plan of Care Review  Outcome: Outcome(s) achieved Date Met: 08/03/18 08/03/18 1047   Coping/Psychosocial   Plan of Care Reviewed With patient   Plan of Care Review   Progress improving   OTHER   Outcome Summary VSS, no complaints of pain, pt due to be discharged this AM     Goal: Individualization and Mutuality  Outcome: Outcome(s) achieved Date Met: 08/03/18    Goal: Discharge Needs Assessment  Outcome: Outcome(s) achieved Date Met: 08/03/18    Goal: Interprofessional Rounds/Family Conf  Outcome: Outcome(s) achieved Date Met: 08/03/18      Problem: Stroke (Ischemic) (Adult)  Goal: Signs and Symptoms of Listed Potential Problems Will be Absent, Minimized or Managed (Stroke)  Outcome: Outcome(s) achieved Date Met: 08/03/18

## 2018-08-03 NOTE — PLAN OF CARE
Problem: Patient Care Overview  Goal: Plan of Care Review  Outcome: Ongoing (interventions implemented as appropriate)    Goal: Individualization and Mutuality  Outcome: Ongoing (interventions implemented as appropriate)    Goal: Discharge Needs Assessment  Outcome: Ongoing (interventions implemented as appropriate)      Problem: Stroke (Ischemic) (Adult)  Goal: Signs and Symptoms of Listed Potential Problems Will be Absent, Minimized or Managed (Stroke)  Outcome: Ongoing (interventions implemented as appropriate)
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
## 2018-08-03 NOTE — PLAN OF CARE
Problem: Patient Care Overview  Goal: Plan of Care Review   08/03/18 6387   OTHER   Outcome Summary VSS. Pt asymptomatic. Pt states she will take her migraine medicine at night. DC today. Continue to monitor.      Goal: Individualization and Mutuality  Outcome: Ongoing (interventions implemented as appropriate)    Goal: Discharge Needs Assessment  Outcome: Ongoing (interventions implemented as appropriate)      Problem: Stroke (Ischemic) (Adult)  Goal: Signs and Symptoms of Listed Potential Problems Will be Absent, Minimized or Managed (Stroke)  Outcome: Ongoing (interventions implemented as appropriate)

## 2018-09-26 ENCOUNTER — TELEPHONE (OUTPATIENT)
Dept: FAMILY MEDICINE CLINIC | Facility: CLINIC | Age: 33
End: 2018-09-26

## 2018-09-26 RX ORDER — DEXTROAMPHETAMINE SACCHARATE, AMPHETAMINE ASPARTATE, DEXTROAMPHETAMINE SULFATE AND AMPHETAMINE SULFATE 7.5; 7.5; 7.5; 7.5 MG/1; MG/1; MG/1; MG/1
30 TABLET ORAL 2 TIMES DAILY
Qty: 60 TABLET | Refills: 0 | Status: SHIPPED | OUTPATIENT
Start: 2018-09-26 | End: 2018-11-29 | Stop reason: SDUPTHER

## 2018-11-29 ENCOUNTER — TELEPHONE (OUTPATIENT)
Dept: FAMILY MEDICINE CLINIC | Facility: CLINIC | Age: 33
End: 2018-11-29

## 2018-11-29 RX ORDER — DEXTROAMPHETAMINE SACCHARATE, AMPHETAMINE ASPARTATE, DEXTROAMPHETAMINE SULFATE AND AMPHETAMINE SULFATE 7.5; 7.5; 7.5; 7.5 MG/1; MG/1; MG/1; MG/1
30 TABLET ORAL 2 TIMES DAILY
Qty: 60 TABLET | Refills: 0 | Status: SHIPPED | OUTPATIENT
Start: 2018-11-29 | End: 2019-02-13 | Stop reason: SDUPTHER

## 2018-12-03 RX ORDER — VENLAFAXINE 37.5 MG/1
TABLET ORAL
Qty: 30 TABLET | Refills: 6 | Status: SHIPPED | OUTPATIENT
Start: 2018-12-03 | End: 2019-09-12 | Stop reason: SDUPTHER

## 2019-02-13 ENCOUNTER — TELEPHONE (OUTPATIENT)
Dept: FAMILY MEDICINE CLINIC | Facility: CLINIC | Age: 34
End: 2019-02-13

## 2019-02-13 RX ORDER — DEXTROAMPHETAMINE SACCHARATE, AMPHETAMINE ASPARTATE, DEXTROAMPHETAMINE SULFATE AND AMPHETAMINE SULFATE 7.5; 7.5; 7.5; 7.5 MG/1; MG/1; MG/1; MG/1
30 TABLET ORAL 2 TIMES DAILY
Qty: 60 TABLET | Refills: 0 | Status: SHIPPED | OUTPATIENT
Start: 2019-02-13 | End: 2019-05-20 | Stop reason: SDUPTHER

## 2019-02-13 NOTE — TELEPHONE ENCOUNTER
Patient called she needs her  adderall [facility] if there is a problem please call her at  [phone]

## 2019-02-14 RX ORDER — LEVOTHYROXINE SODIUM 0.07 MG/1
75 TABLET ORAL DAILY
Qty: 30 TABLET | Refills: 3 | Status: SHIPPED | OUTPATIENT
Start: 2019-02-14 | End: 2019-08-09 | Stop reason: SDUPTHER

## 2019-05-20 ENCOUNTER — TELEPHONE (OUTPATIENT)
Dept: FAMILY MEDICINE CLINIC | Facility: CLINIC | Age: 34
End: 2019-05-20

## 2019-05-20 RX ORDER — DEXTROAMPHETAMINE SACCHARATE, AMPHETAMINE ASPARTATE, DEXTROAMPHETAMINE SULFATE AND AMPHETAMINE SULFATE 7.5; 7.5; 7.5; 7.5 MG/1; MG/1; MG/1; MG/1
30 TABLET ORAL 2 TIMES DAILY
Qty: 60 TABLET | Refills: 0 | Status: SHIPPED | OUTPATIENT
Start: 2019-05-20 | End: 2019-09-12 | Stop reason: SDUPTHER

## 2019-06-20 DIAGNOSIS — E04.1 THYROID NODULE: Primary | ICD-10-CM

## 2019-06-24 ENCOUNTER — OFFICE VISIT (OUTPATIENT)
Dept: FAMILY MEDICINE CLINIC | Facility: CLINIC | Age: 34
End: 2019-06-24

## 2019-06-24 VITALS
DIASTOLIC BLOOD PRESSURE: 58 MMHG | SYSTOLIC BLOOD PRESSURE: 92 MMHG | BODY MASS INDEX: 29.35 KG/M2 | HEIGHT: 67 IN | WEIGHT: 187 LBS | TEMPERATURE: 98.4 F | OXYGEN SATURATION: 99 % | HEART RATE: 72 BPM

## 2019-06-24 DIAGNOSIS — E03.9 HYPOTHYROIDISM, UNSPECIFIED TYPE: ICD-10-CM

## 2019-06-24 DIAGNOSIS — G90.A POTS (POSTURAL ORTHOSTATIC TACHYCARDIA SYNDROME): ICD-10-CM

## 2019-06-24 DIAGNOSIS — Z51.81 ENCOUNTER FOR MEDICATION MONITORING: Primary | ICD-10-CM

## 2019-06-24 DIAGNOSIS — D32.9 MENINGIOMA (HCC): ICD-10-CM

## 2019-06-24 DIAGNOSIS — Z86.69 HISTORY OF MIGRAINE HEADACHES: ICD-10-CM

## 2019-06-24 LAB — TSH SERPL DL<=0.05 MIU/L-ACNC: 1.13 MIU/ML (ref 0.27–4.2)

## 2019-06-24 PROCEDURE — 99213 OFFICE O/P EST LOW 20 MIN: CPT | Performed by: FAMILY MEDICINE

## 2019-06-24 PROCEDURE — 84443 ASSAY THYROID STIM HORMONE: CPT | Performed by: FAMILY MEDICINE

## 2019-06-24 PROCEDURE — 36415 COLL VENOUS BLD VENIPUNCTURE: CPT | Performed by: FAMILY MEDICINE

## 2019-06-24 NOTE — PROGRESS NOTES
SUBJECTIVE:  The patient is a 33-year-old white female.  She has a history of migraines, transient cerebral ischemia, and meningioma.    PAST MEDICAL HISTORY:  Reviewed.    REVIEW OF SYSTEMS:  Please see above; 14 point ROS negative.      OBJECTIVE:   Vitals signs are reviewed and are stable.    General:  Well-nourished.  Alert and oriented x3 in no acute distress.  HEENT: PERRLA.   Neck:  Supple.   Lungs:  Clear.    Heart:  Regular rate and rhythm.   Abdomen:   Soft, nontender.   Extremities:  No cyanosis, clubbing or edema.   Neurological:  Grossly intact without motor or sensory deficits.     ASSESSMENT:    History of transient cerebral ischemia  Migraine headaches  POTS  Meningioma.  PLAN: She will keep her appointments with neurology and cardiology as scheduled.  She is due an MRI for meningioma and the next month or 2.  She is advised to reschedule her thyroid ultrasound which she did not do.  Ordered.    Dictated utilizing Dragon dictation.

## 2019-06-28 LAB — CONV REPORT SUMMARY: NORMAL

## 2019-07-01 ENCOUNTER — HOSPITAL ENCOUNTER (OUTPATIENT)
Dept: ULTRASOUND IMAGING | Facility: HOSPITAL | Age: 34
Discharge: HOME | End: 2019-07-01
Admitting: FAMILY MEDICINE

## 2019-07-01 PROCEDURE — 76536 US EXAM OF HEAD AND NECK: CPT

## 2019-07-02 DIAGNOSIS — E04.1 THYROID NODULE: Primary | ICD-10-CM

## 2019-08-08 ENCOUNTER — TELEPHONE (OUTPATIENT)
Dept: NEUROSURGERY | Facility: CLINIC | Age: 34
End: 2019-08-08

## 2019-08-08 NOTE — TELEPHONE ENCOUNTER
Called the patient as she no-showed her appointment 8/6/2019 with Dr. Coats. If she would like us to follow her for this Meningioma she will need to get the disc from [facility] so they can compare them to the ones up here. The patient has been seeing Dr. Vazquez Elaine for PCOS and found out she has Epilepsy. She will attempt to get the MRI on a disc from [facility] and bring it up here for our radiologist to compare it to a new one done here to see if there is any changes. She will then call the office to get her appointment r/s.    ----- Message from Katharine Avina MA sent at 8/8/2019  3:44 PM EDT -----  Regarding: Overdue tasks  Does she need to follow up after this Brain MRI? She had one recently ordered by Dr. Guy Kelly for meningioma. I see that she was schedules to see Dr. Coats 8/6/19 but I don't see where she was seen in [facility].     The order we put in for her is in the over due task.   ----- Message -----  From: SYSTEM  Sent: 8/7/2019  12:14 AM  To: [facility] [place] [place] [facility]

## 2019-08-09 RX ORDER — LEVOTHYROXINE SODIUM 0.07 MG/1
TABLET ORAL
Qty: 30 TABLET | Refills: 3 | Status: SHIPPED | OUTPATIENT
Start: 2019-08-09 | End: 2019-09-12 | Stop reason: SDUPTHER

## 2019-08-19 DIAGNOSIS — D49.6 BRAIN TUMOR (HCC): Primary | ICD-10-CM

## 2019-08-28 ENCOUNTER — HOSPITAL ENCOUNTER (EMERGENCY)
Facility: HOSPITAL | Age: 34
Discharge: HOME | End: 2019-08-28
Attending: EMERGENCY MEDICINE | Admitting: PHYSICIAN ASSISTANT

## 2019-08-28 ENCOUNTER — APPOINTMENT (OUTPATIENT)
Dept: CT IMAGING | Facility: HOSPITAL | Age: 34
End: 2019-08-28

## 2019-08-28 VITALS
RESPIRATION RATE: 16 BRPM | HEART RATE: 70 BPM | TEMPERATURE: 97.7 F | HEIGHT: 67 IN | BODY MASS INDEX: 32.47 KG/M2 | OXYGEN SATURATION: 94 % | DIASTOLIC BLOOD PRESSURE: 49 MMHG | SYSTOLIC BLOOD PRESSURE: 100 MMHG | WEIGHT: 206.9 LBS

## 2019-08-28 DIAGNOSIS — G40.909 SEIZURE DISORDER (HCC): Primary | ICD-10-CM

## 2019-08-28 LAB
ALBUMIN SERPL-MCNC: 3.9 G/DL (ref 3.5–5.2)
ALBUMIN/GLOB SERPL: 1.3 G/DL
ALP SERPL-CCNC: 77 U/L (ref 39–117)
ALT SERPL W P-5'-P-CCNC: 11 U/L (ref 1–33)
AMPHET+METHAMPHET UR QL: NEGATIVE
ANION GAP SERPL CALCULATED.3IONS-SCNC: 10.4 MMOL/L (ref 5–15)
AST SERPL-CCNC: 13 U/L (ref 1–32)
BACTERIA UR QL AUTO: ABNORMAL /HPF
BARBITURATES UR QL SCN: NEGATIVE
BASOPHILS # BLD AUTO: 0.03 10*3/MM3 (ref 0–0.2)
BASOPHILS NFR BLD AUTO: 0.4 % (ref 0–1.5)
BENZODIAZ UR QL SCN: NEGATIVE
BILIRUB SERPL-MCNC: <0.2 MG/DL (ref 0.2–1.2)
BILIRUB UR QL STRIP: NEGATIVE
BUN BLD-MCNC: 17 MG/DL (ref 6–20)
BUN/CREAT SERPL: 25.4 (ref 7–25)
CALCIUM SPEC-SCNC: 8.8 MG/DL (ref 8.6–10.5)
CANNABINOIDS SERPL QL: POSITIVE
CARBAMAZEPINE SERPL-MCNC: <2 MCG/ML (ref 4–12)
CHLORIDE SERPL-SCNC: 105 MMOL/L (ref 98–107)
CLARITY UR: CLEAR
CO2 SERPL-SCNC: 25.6 MMOL/L (ref 22–29)
COCAINE UR QL: NEGATIVE
COLOR UR: YELLOW
CREAT BLD-MCNC: 0.67 MG/DL (ref 0.57–1)
DEPRECATED RDW RBC AUTO: 43.8 FL (ref 37–54)
EOSINOPHIL # BLD AUTO: 0 10*3/MM3 (ref 0–0.4)
EOSINOPHIL NFR BLD AUTO: 0 % (ref 0.3–6.2)
ERYTHROCYTE [DISTWIDTH] IN BLOOD BY AUTOMATED COUNT: 13.2 % (ref 12.3–15.4)
ETHANOL BLD-MCNC: <10 MG/DL (ref 0–10)
ETHANOL UR QL: <0.01 %
GFR SERPL CREATININE-BSD FRML MDRD: 102 ML/MIN/1.73
GLOBULIN UR ELPH-MCNC: 3 GM/DL
GLUCOSE BLD-MCNC: 97 MG/DL (ref 65–99)
GLUCOSE UR STRIP-MCNC: NEGATIVE MG/DL
HCT VFR BLD AUTO: 38.9 % (ref 34–46.6)
HGB BLD-MCNC: 12.1 G/DL (ref 12–15.9)
HGB UR QL STRIP.AUTO: NEGATIVE
HYALINE CASTS UR QL AUTO: ABNORMAL /LPF
IMM GRANULOCYTES # BLD AUTO: 0.03 10*3/MM3 (ref 0–0.05)
IMM GRANULOCYTES NFR BLD AUTO: 0.4 % (ref 0–0.5)
KETONES UR QL STRIP: ABNORMAL
LEUKOCYTE ESTERASE UR QL STRIP.AUTO: ABNORMAL
LYMPHOCYTES # BLD AUTO: 1.56 10*3/MM3 (ref 0.7–3.1)
LYMPHOCYTES NFR BLD AUTO: 19.9 % (ref 19.6–45.3)
MCH RBC QN AUTO: 27.7 PG (ref 26.6–33)
MCHC RBC AUTO-ENTMCNC: 31.1 G/DL (ref 31.5–35.7)
MCV RBC AUTO: 89 FL (ref 79–97)
METHADONE UR QL SCN: NEGATIVE
MONOCYTES # BLD AUTO: 0.77 10*3/MM3 (ref 0.1–0.9)
MONOCYTES NFR BLD AUTO: 9.8 % (ref 5–12)
NEUTROPHILS # BLD AUTO: 5.45 10*3/MM3 (ref 1.7–7)
NEUTROPHILS NFR BLD AUTO: 69.5 % (ref 42.7–76)
NITRITE UR QL STRIP: NEGATIVE
NRBC BLD AUTO-RTO: 0 /100 WBC (ref 0–0.2)
OPIATES UR QL: NEGATIVE
OXYCODONE UR QL SCN: NEGATIVE
PH UR STRIP.AUTO: <=5 [PH] (ref 5–8)
PLATELET # BLD AUTO: 282 10*3/MM3 (ref 140–450)
PMV BLD AUTO: 8.6 FL (ref 6–12)
POTASSIUM BLD-SCNC: 3.7 MMOL/L (ref 3.5–5.2)
PROT SERPL-MCNC: 6.9 G/DL (ref 6–8.5)
PROT UR QL STRIP: NEGATIVE
RBC # BLD AUTO: 4.37 10*6/MM3 (ref 3.77–5.28)
RBC # UR: ABNORMAL /HPF
REF LAB TEST METHOD: ABNORMAL
SODIUM BLD-SCNC: 141 MMOL/L (ref 136–145)
SP GR UR STRIP: >=1.03 (ref 1–1.03)
SQUAMOUS #/AREA URNS HPF: ABNORMAL /HPF
UROBILINOGEN UR QL STRIP: ABNORMAL
WBC NRBC COR # BLD: 7.84 10*3/MM3 (ref 3.4–10.8)
WBC UR QL AUTO: ABNORMAL /HPF

## 2019-08-28 PROCEDURE — 80307 DRUG TEST PRSMV CHEM ANLYZR: CPT | Performed by: PHYSICIAN ASSISTANT

## 2019-08-28 PROCEDURE — 96374 THER/PROPH/DIAG INJ IV PUSH: CPT

## 2019-08-28 PROCEDURE — 85025 COMPLETE CBC W/AUTO DIFF WBC: CPT | Performed by: PHYSICIAN ASSISTANT

## 2019-08-28 PROCEDURE — 25010000002 LORAZEPAM PER 2 MG: Performed by: EMERGENCY MEDICINE

## 2019-08-28 PROCEDURE — 99284 EMERGENCY DEPT VISIT MOD MDM: CPT

## 2019-08-28 PROCEDURE — 80053 COMPREHEN METABOLIC PANEL: CPT | Performed by: PHYSICIAN ASSISTANT

## 2019-08-28 PROCEDURE — 93010 ELECTROCARDIOGRAM REPORT: CPT | Performed by: INTERNAL MEDICINE

## 2019-08-28 PROCEDURE — 80156 ASSAY CARBAMAZEPINE TOTAL: CPT | Performed by: PHYSICIAN ASSISTANT

## 2019-08-28 PROCEDURE — 93005 ELECTROCARDIOGRAM TRACING: CPT | Performed by: PHYSICIAN ASSISTANT

## 2019-08-28 PROCEDURE — 81001 URINALYSIS AUTO W/SCOPE: CPT | Performed by: PHYSICIAN ASSISTANT

## 2019-08-28 PROCEDURE — 70450 CT HEAD/BRAIN W/O DYE: CPT

## 2019-08-28 RX ORDER — OXCARBAZEPINE 600 MG/1
600 TABLET, FILM COATED ORAL 2 TIMES DAILY
Qty: 30 TABLET | Refills: 0 | Status: SHIPPED | OUTPATIENT
Start: 2019-08-28 | End: 2019-09-12 | Stop reason: SDUPTHER

## 2019-08-28 RX ORDER — LORAZEPAM 2 MG/ML
1 INJECTION INTRAMUSCULAR ONCE
Status: COMPLETED | OUTPATIENT
Start: 2019-08-28 | End: 2019-08-28

## 2019-08-28 RX ORDER — SODIUM CHLORIDE 0.9 % (FLUSH) 0.9 %
10 SYRINGE (ML) INJECTION AS NEEDED
Status: DISCONTINUED | OUTPATIENT
Start: 2019-08-28 | End: 2019-08-28 | Stop reason: HOSPADM

## 2019-08-28 RX ADMIN — LORAZEPAM 1 MG: 2 INJECTION INTRAMUSCULAR; INTRAVENOUS at 05:00

## 2019-08-28 RX ADMIN — SODIUM CHLORIDE 1000 ML: 9 INJECTION, SOLUTION INTRAVENOUS at 03:58

## 2019-08-29 RX ORDER — CLONAZEPAM 1 MG/1
TABLET ORAL
Qty: 30 TABLET | Refills: 1 | Status: SHIPPED | OUTPATIENT
Start: 2019-08-29 | End: 2019-09-12 | Stop reason: SDUPTHER

## 2019-09-03 DIAGNOSIS — G90.A POTS (POSTURAL ORTHOSTATIC TACHYCARDIA SYNDROME): Primary | ICD-10-CM

## 2019-09-03 DIAGNOSIS — Z86.69 HISTORY OF MIGRAINE HEADACHES: ICD-10-CM

## 2019-09-03 DIAGNOSIS — D32.9 MENINGIOMA (HCC): ICD-10-CM

## 2019-09-03 DIAGNOSIS — I67.82 CEREBRAL ISCHEMIA: ICD-10-CM

## 2019-09-11 ENCOUNTER — TELEPHONE (OUTPATIENT)
Dept: FAMILY MEDICINE CLINIC | Facility: CLINIC | Age: 34
End: 2019-09-11

## 2019-09-11 NOTE — TELEPHONE ENCOUNTER
REFILL ON clonazePAM (KlonoPIN) 1 MG tablet  TAKES 3 TIMES A DAY   amphetamine-dextroamphetamine (ADDERALL) 30 MG tablet  venlafaxine (EFFEXOR) 37.5 MG tablet  levothyroxine (SYNTHROID, LEVOTHROID) 75 MCG tablet  OXcarbazepine (TRILEPTAL) 600 MG tablet  ondansetron ODT (ZOFRAN-ODT) 4 MG disintegrating tablet    WANTS TO KNOW IF MIGRAINE MEDICATION CAN BE CALLED IN

## 2019-09-11 NOTE — TELEPHONE ENCOUNTER
Call this patient.  I am not so sure I have been refilling all these prescriptions for her.  See if she has been seen other doctors for some of these medications.

## 2019-09-12 ENCOUNTER — TELEPHONE (OUTPATIENT)
Dept: FAMILY MEDICINE CLINIC | Facility: CLINIC | Age: 34
End: 2019-09-12

## 2019-09-12 RX ORDER — TIZANIDINE HYDROCHLORIDE 2 MG/1
2 CAPSULE, GELATIN COATED ORAL 3 TIMES DAILY
Qty: 60 CAPSULE | Refills: 1 | Status: SHIPPED | OUTPATIENT
Start: 2019-09-12 | End: 2019-10-09

## 2019-09-12 RX ORDER — OXCARBAZEPINE 600 MG/1
600 TABLET, FILM COATED ORAL 2 TIMES DAILY
Qty: 30 TABLET | Refills: 0 | Status: SHIPPED | OUTPATIENT
Start: 2019-09-12 | End: 2019-09-12 | Stop reason: SDUPTHER

## 2019-09-12 RX ORDER — VENLAFAXINE 37.5 MG/1
37.5 TABLET ORAL DAILY
Qty: 30 TABLET | Refills: 6 | Status: SHIPPED | OUTPATIENT
Start: 2019-09-12 | End: 2019-10-09

## 2019-09-12 RX ORDER — DEXTROAMPHETAMINE SACCHARATE, AMPHETAMINE ASPARTATE, DEXTROAMPHETAMINE SULFATE AND AMPHETAMINE SULFATE 7.5; 7.5; 7.5; 7.5 MG/1; MG/1; MG/1; MG/1
30 TABLET ORAL 2 TIMES DAILY
Qty: 60 TABLET | Refills: 0 | Status: SHIPPED | OUTPATIENT
Start: 2019-09-12 | End: 2019-10-09

## 2019-09-12 RX ORDER — CLONAZEPAM 1 MG/1
1 TABLET ORAL 3 TIMES DAILY PRN
Qty: 30 TABLET | Refills: 1 | Status: SHIPPED | OUTPATIENT
Start: 2019-09-12 | End: 2019-09-25 | Stop reason: SDUPTHER

## 2019-09-12 RX ORDER — OXCARBAZEPINE 600 MG/1
600 TABLET, FILM COATED ORAL 2 TIMES DAILY
Qty: 30 TABLET | Refills: 0 | Status: SHIPPED | OUTPATIENT
Start: 2019-09-12 | End: 2019-10-15 | Stop reason: SDUPTHER

## 2019-09-12 RX ORDER — ONDANSETRON 4 MG/1
4 TABLET, ORALLY DISINTEGRATING ORAL EVERY 8 HOURS PRN
Qty: 30 TABLET | Refills: 1 | Status: SHIPPED | OUTPATIENT
Start: 2019-09-12 | End: 2019-11-25

## 2019-09-12 RX ORDER — LEVOTHYROXINE SODIUM 0.07 MG/1
75 TABLET ORAL DAILY
Qty: 30 TABLET | Refills: 3 | Status: ON HOLD | OUTPATIENT
Start: 2019-09-12 | End: 2019-10-21 | Stop reason: SDUPTHER

## 2019-09-12 NOTE — TELEPHONE ENCOUNTER
Patient informed, she will be seeing a neurologist next month and will discuss migraine RX with them  ----- Message from Guy Kelly MD sent at 9/12/2019  8:35 AM EDT -----  I called entire tizanidine but make sure she gets referred to a neurologist.  ----- Message -----  From: Nova Johnson MA  Sent: 9/12/2019   8:17 AM  To: Guy Kelly MD    She said her prior PCP Dr Pruitt gave her what she called a migraine cocktail, which was a muscle relaxer in chart I found Tizanidine and nausea med Zofran but not sure what the other med was, she stopped the Topamax a few years back because it didn't help.

## 2019-09-17 ENCOUNTER — OFFICE VISIT (OUTPATIENT)
Dept: NEUROSURGERY | Facility: CLINIC | Age: 34
End: 2019-09-17

## 2019-09-17 ENCOUNTER — PREP FOR SURGERY (OUTPATIENT)
Dept: OTHER | Facility: HOSPITAL | Age: 34
End: 2019-09-17

## 2019-09-17 VITALS — DIASTOLIC BLOOD PRESSURE: 81 MMHG | SYSTOLIC BLOOD PRESSURE: 121 MMHG | HEART RATE: 87 BPM

## 2019-09-17 DIAGNOSIS — D32.9 MENINGIOMA (HCC): Primary | ICD-10-CM

## 2019-09-17 PROCEDURE — 99213 OFFICE O/P EST LOW 20 MIN: CPT | Performed by: NEUROLOGICAL SURGERY

## 2019-09-17 RX ORDER — SULFAMETHOXAZOLE AND TRIMETHOPRIM 800; 160 MG/1; MG/1
1 TABLET ORAL
COMMUNITY
Start: 2019-09-11 | End: 2019-09-18

## 2019-09-17 RX ORDER — VANCOMYCIN HYDROCHLORIDE 1 G/200ML
1000 INJECTION, SOLUTION INTRAVENOUS ONCE
Status: CANCELLED | OUTPATIENT
Start: 2019-09-17 | End: 2019-09-17

## 2019-09-17 NOTE — PROGRESS NOTES
Subjective   Patient ID: Desiree Peterson is a 33 y.o. female is here today for follow-up on Benign Neoplasm of Cerebral meninges. She has dizziness, headaches, nausea without vomiting and visual disturbances.    History of Present Illness    This patient continues with dizziness and headaches as well as nausea.  She says that she also has a seizure disorder and her neurologist feels that the meningioma is the cause of the seizure disorder and therefore needs to come out.    The following portions of the patient's history were reviewed and updated as appropriate: allergies, current medications, past family history, past medical history, past social history, past surgical history and problem list.    Review of Systems   Eyes: Positive for visual disturbance.   Respiratory: Negative for chest tightness and shortness of breath.    Cardiovascular: Negative for chest pain.   Gastrointestinal: Positive for nausea. Negative for vomiting.   Neurological: Positive for dizziness and headaches.   All other systems reviewed and are negative.      Objective   Physical Exam   Constitutional: She is oriented to person, place, and time. She appears well-developed and well-nourished.   Eyes: EOM are normal. Pupils are equal, round, and reactive to light.   Neurological: She is oriented to person, place, and time. She has a normal Finger-Nose-Finger Test and a normal Heel to Shin Test. Gait normal.   Reflex Scores:       Tricep reflexes are 2+ on the right side and 2+ on the left side.       Bicep reflexes are 2+ on the right side and 2+ on the left side.       Brachioradialis reflexes are 2+ on the right side and 2+ on the left side.       Patellar reflexes are 2+ on the right side and 2+ on the left side.       Achilles reflexes are 2+ on the right side and 2+ on the left side.  Psychiatric: Her speech is normal.     Neurologic Exam     Mental Status   Oriented to person, place, and time.   Registration of memory: Good recent  and remote memory.   Attention: normal. Concentration: normal.   Speech: speech is normal   Level of consciousness: alert  Knowledge: consistent with education.     Cranial Nerves     CN II   Visual fields full to confrontation.   Visual acuity: normal    CN III, IV, VI   Pupils are equal, round, and reactive to light.  Extraocular motions are normal.     CN V   Facial sensation intact.   Right corneal reflex: normal  Left corneal reflex: normal    CN VII   Facial expression full, symmetric.   Right facial weakness: none  Left facial weakness: none    CN VIII   Hearing: intact    CN IX, X   Palate: symmetric    CN XI   Right sternocleidomastoid strength: normal  Left sternocleidomastoid strength: normal    CN XII   Tongue: not atrophic  Tongue deviation: none    Motor Exam   Muscle bulk: normal  Right arm tone: normal  Left arm tone: normal  Right leg tone: normal  Left leg tone: normal    Strength   Strength 5/5 except as noted.     Sensory Exam   Light touch normal.     Gait, Coordination, and Reflexes     Gait  Gait: normal    Coordination   Finger to nose coordination: normal  Heel to shin coordination: normal    Reflexes   Right brachioradialis: 2+  Left brachioradialis: 2+  Right biceps: 2+  Left biceps: 2+  Right triceps: 2+  Left triceps: 2+  Right patellar: 2+  Left patellar: 2+  Right achilles: 2+  Left achilles: 2+  Right : 2+  Left : 2+      Assessment/Plan   Independent Review of Radiographic Studies:      I reviewed her MRI of the brain done on 25 July of this year in [place].  This does show the tumor in the right sylvian fissure region.  It now measures 16 x 10 x 15 mm.  I compared it to a MRI that was done in 2017 and at that time the tumor measured 6 x 11 x 15 mm.  I believe that it is grown into the brain a bit more than it was before.    Medical Decision Making:      I told the patient and her mother that since the tumor is clearly showing growth and she is only 33 years old that I  would recommend taking it out at this point.  In addition her neurologist feels it is the cause of her seizures.  I told her I do not agree with this but the reason for the surgery is the growth anyway and so if it so happens that her seizures get better after removing it that would be spectacular.  I explained that there was a risk of infection, bleeding, CSF leak, as well as paralysis, coma and other neurologic deficits that are impossible to predict.  She does wish to proceed.    Desiree was seen today for follow-up.    Diagnoses and all orders for this visit:    Meningioma (CMS/[facility])  -     CT head w contrast; Future      Return for 2-3 week post op.

## 2019-09-23 ENCOUNTER — TELEPHONE (OUTPATIENT)
Dept: FAMILY MEDICINE CLINIC | Facility: CLINIC | Age: 34
End: 2019-09-23

## 2019-09-24 ENCOUNTER — TELEPHONE (OUTPATIENT)
Dept: FAMILY MEDICINE CLINIC | Facility: CLINIC | Age: 34
End: 2019-09-24

## 2019-09-24 RX ORDER — ONDANSETRON 4 MG/1
4 TABLET, FILM COATED ORAL EVERY 8 HOURS PRN
Qty: 30 TABLET | Refills: 1 | Status: ON HOLD | OUTPATIENT
Start: 2019-09-24 | End: 2019-10-21 | Stop reason: SDUPTHER

## 2019-09-24 NOTE — TELEPHONE ENCOUNTER
Pt called and said her er doctor told her to increase her clonazepam up to 3 times a day but she has been taking 2 times a day for the most part. Please send a refill to [facility] [address].

## 2019-09-25 RX ORDER — CLONAZEPAM 1 MG/1
1 TABLET ORAL 3 TIMES DAILY PRN
Qty: 60 TABLET | Refills: 1 | Status: ON HOLD | OUTPATIENT
Start: 2019-09-25 | End: 2019-10-21 | Stop reason: SDUPTHER

## 2019-10-09 ENCOUNTER — APPOINTMENT (OUTPATIENT)
Dept: PREADMISSION TESTING | Facility: HOSPITAL | Age: 34
End: 2019-10-09

## 2019-10-09 ENCOUNTER — APPOINTMENT (OUTPATIENT)
Dept: CT IMAGING | Facility: HOSPITAL | Age: 34
End: 2019-10-09

## 2019-10-09 VITALS
HEART RATE: 82 BPM | DIASTOLIC BLOOD PRESSURE: 72 MMHG | HEIGHT: 68 IN | SYSTOLIC BLOOD PRESSURE: 107 MMHG | BODY MASS INDEX: 28.64 KG/M2 | WEIGHT: 189 LBS | OXYGEN SATURATION: 100 % | RESPIRATION RATE: 18 BRPM | TEMPERATURE: 97.3 F

## 2019-10-09 DIAGNOSIS — D32.9 MENINGIOMA (HCC): ICD-10-CM

## 2019-10-09 LAB
ABO GROUP BLD: NORMAL
ANION GAP SERPL CALCULATED.3IONS-SCNC: 9.6 MMOL/L (ref 5–15)
BLD GP AB SCN SERPL QL: NEGATIVE
BUN BLD-MCNC: 9 MG/DL (ref 6–20)
BUN/CREAT SERPL: 14.3 (ref 7–25)
CALCIUM SPEC-SCNC: 8.8 MG/DL (ref 8.6–10.5)
CHLORIDE SERPL-SCNC: 98 MMOL/L (ref 98–107)
CO2 SERPL-SCNC: 28.4 MMOL/L (ref 22–29)
CREAT BLD-MCNC: 0.63 MG/DL (ref 0.57–1)
DEPRECATED RDW RBC AUTO: 41.3 FL (ref 37–54)
ERYTHROCYTE [DISTWIDTH] IN BLOOD BY AUTOMATED COUNT: 13.1 % (ref 12.3–15.4)
GFR SERPL CREATININE-BSD FRML MDRD: 110 ML/MIN/1.73
GLUCOSE BLD-MCNC: 89 MG/DL (ref 65–99)
HCG SERPL QL: NEGATIVE
HCT VFR BLD AUTO: 38.6 % (ref 34–46.6)
HGB BLD-MCNC: 12.3 G/DL (ref 12–15.9)
MCH RBC QN AUTO: 27.2 PG (ref 26.6–33)
MCHC RBC AUTO-ENTMCNC: 31.9 G/DL (ref 31.5–35.7)
MCV RBC AUTO: 85.2 FL (ref 79–97)
PLATELET # BLD AUTO: 204 10*3/MM3 (ref 140–450)
PMV BLD AUTO: 8.4 FL (ref 6–12)
POTASSIUM BLD-SCNC: 4.3 MMOL/L (ref 3.5–5.2)
RBC # BLD AUTO: 4.53 10*6/MM3 (ref 3.77–5.28)
RH BLD: POSITIVE
SODIUM BLD-SCNC: 136 MMOL/L (ref 136–145)
T&S EXPIRATION DATE: NORMAL
WBC NRBC COR # BLD: 3.93 10*3/MM3 (ref 3.4–10.8)

## 2019-10-09 PROCEDURE — 93005 ELECTROCARDIOGRAM TRACING: CPT

## 2019-10-09 PROCEDURE — 85027 COMPLETE CBC AUTOMATED: CPT | Performed by: NEUROLOGICAL SURGERY

## 2019-10-09 PROCEDURE — 36415 COLL VENOUS BLD VENIPUNCTURE: CPT

## 2019-10-09 PROCEDURE — 86900 BLOOD TYPING SEROLOGIC ABO: CPT | Performed by: NEUROLOGICAL SURGERY

## 2019-10-09 PROCEDURE — 80048 BASIC METABOLIC PNL TOTAL CA: CPT | Performed by: NEUROLOGICAL SURGERY

## 2019-10-09 PROCEDURE — 86901 BLOOD TYPING SEROLOGIC RH(D): CPT | Performed by: NEUROLOGICAL SURGERY

## 2019-10-09 PROCEDURE — 93010 ELECTROCARDIOGRAM REPORT: CPT | Performed by: INTERNAL MEDICINE

## 2019-10-09 PROCEDURE — 86850 RBC ANTIBODY SCREEN: CPT | Performed by: NEUROLOGICAL SURGERY

## 2019-10-09 PROCEDURE — 84703 CHORIONIC GONADOTROPIN ASSAY: CPT | Performed by: NEUROLOGICAL SURGERY

## 2019-10-09 RX ORDER — TIZANIDINE HYDROCHLORIDE 2 MG/1
2 CAPSULE, GELATIN COATED ORAL 3 TIMES DAILY PRN
COMMUNITY
End: 2019-11-04 | Stop reason: HOSPADM

## 2019-10-09 RX ORDER — VENLAFAXINE HYDROCHLORIDE 37.5 MG/1
37.5 CAPSULE, EXTENDED RELEASE ORAL NIGHTLY
COMMUNITY
End: 2019-11-01 | Stop reason: SDUPTHER

## 2019-10-09 RX ORDER — DEXTROAMPHETAMINE SACCHARATE, AMPHETAMINE ASPARTATE, DEXTROAMPHETAMINE SULFATE AND AMPHETAMINE SULFATE 7.5; 7.5; 7.5; 7.5 MG/1; MG/1; MG/1; MG/1
30 TABLET ORAL AS NEEDED
COMMUNITY
End: 2019-10-15

## 2019-10-09 NOTE — DISCHARGE INSTRUCTIONS
Take the following medications the morning of surgery with a small sip of water: TRILEPTAL    ARRIVE AT 8AM    General Instructions:  • Do not eat solid food after midnight the night before surgery.  • You may drink clear liquids day of surgery but must stop at least one hour before your hospital arrival time.  • It is beneficial for you to have a clear drink that contains carbohydrates the day of surgery.  We suggest a 12 to 20 ounce bottle of Gatorade or Powerade for non-diabetic patients or a 12 to 20 ounce bottle of G2 or Powerade Zero for diabetic patients. (Pediatric patients, are not advised to drink a 12 to 20 ounce carbohydrate drink)    Clear liquids are liquids you can see through.  Nothing red in color.     Plain water                               Sports drinks  Sodas                                   Gelatin (Jell-O)  Fruit juices without pulp such as white grape juice and apple juice  Popsicles that contain no fruit or yogurt  Tea or coffee (no cream or milk added)  Gatorade / Powerade  G2 / Powerade Zero    • Infants may have breast milk up to four hours before surgery.  • Infants drinking formula may drink formula up to six hours before surgery.   • Patients who avoid smoking, chewing tobacco and alcohol for 4 weeks prior to surgery have a reduced risk of post-operative complications.  Quit smoking as many days before surgery as you can.  • Do not smoke, use chewing tobacco or drink alcohol the day of surgery.   • If applicable bring your C-PAP/ BI-PAP machine.  • Bring any papers given to you in the doctor’s office.  • Wear clean comfortable clothes.  • Do not wear contact lenses, false eyelashes or make-up.  Bring a case for your glasses.   • Bring crutches or walker if applicable.  • Remove all piercings.  Leave jewelry and any other valuables at home.  • Hair extensions with metal clips must be removed prior to surgery.  • The Pre-Admission Testing nurse will instruct you to bring medications if  unable to obtain an accurate list in Pre-Admission Testing.        If you were given a blood bank ID arm band remember to bring it with you the day of surgery.    Preventing a Surgical Site Infection:  • For 2 to 3 days before surgery, avoid shaving with a razor because the razor can irritate skin and make it easier to develop an infection.    • Any areas of open skin can increase the risk of a post-operative wound infection by allowing bacteria to enter and travel throughout the body.  Notify your surgeon if you have any skin wounds / rashes even if it is not near the expected surgical site.  The area will need assessed to determine if surgery should be delayed until it is healed.  • The night prior to surgery sleep in a clean bed with clean clothing.  Do not allow pets to sleep with you.  • Shower on the morning of surgery using a fresh bar of anti-bacterial soap (such as Dial) and clean washcloth.  Dry with a clean towel and dress in clean clothing.  • Ask your surgeon if you will be receiving antibiotics prior to surgery.  • Make sure you, your family, and all healthcare providers clean their hands with soap and water or an alcohol based hand  before caring for you or your wound.    Day of surgery:  Your arrival time is approximately two hours before your scheduled surgery time.  Upon arrival, a Pre-op nurse and Anesthesiologist will review your health history, obtain vital signs, and answer questions you may have.  The only belongings needed at this time will be a list of your home medications and if applicable your C-PAP/BI-PAP machine.  If you are staying overnight your family can leave the rest of your belongings in the car and bring them to your room later.  A Pre-op nurse will start an IV and you may receive medication in preparation for surgery, including something to help you relax.  Your family will be able to see you in the Pre-op area.  Two visitors at a time will be allowed in the Pre-op  room.  While you are in surgery your family should notify the waiting room  if they leave the waiting room area and provide a contact phone number.    Please be aware that surgery does come with discomfort.  We want to make every effort to control your discomfort so please discuss any uncontrolled symptoms with your nurse.   Your doctor will most likely have prescribed pain medications.      If you are going home after surgery you will receive individualized written care instructions before being discharged.  A responsible adult must drive you to and from the hospital on the day of your surgery and stay with you for 24 hours.    If you are staying overnight following surgery, you will be transported to your hospital room following the recovery period.  [facility] has all private rooms.    You have received a list of surgical assistants for your reference.  If you have any questions please call Pre-Admission Testing at [phone].  Deductibles and co-payments are collected on the day of service. Please be prepared to pay the required co-pay, deductible or deposit on the day of service as defined by your plan.

## 2019-10-14 ENCOUNTER — TELEPHONE (OUTPATIENT)
Dept: NEUROSURGERY | Facility: CLINIC | Age: 34
End: 2019-10-14

## 2019-10-14 NOTE — TELEPHONE ENCOUNTER
Pt called regarding her insurance. Her insurance will get reinstated today. Pt number is [phone]

## 2019-10-14 NOTE — TELEPHONE ENCOUNTER
Karen bran/   S/w pt's brother Sher and he said that her employer had cancelled her ins. Pt had to appeal. Pt won the appeal and they will restart her ins.     He does not know when it will start. Pt has not called back yet  Karen  [phone]

## 2019-10-14 NOTE — TELEPHONE ENCOUNTER
Pt says her ins has been reinstated. Pt says if Tino does not show it,then she can call [facility] [phone]

## 2019-10-15 ENCOUNTER — APPOINTMENT (OUTPATIENT)
Dept: CT IMAGING | Facility: HOSPITAL | Age: 34
End: 2019-10-15

## 2019-10-15 ENCOUNTER — HOSPITAL ENCOUNTER (EMERGENCY)
Facility: HOSPITAL | Age: 34
Discharge: HOME | End: 2019-10-15
Attending: EMERGENCY MEDICINE | Admitting: EMERGENCY MEDICINE

## 2019-10-15 ENCOUNTER — HOSPITAL ENCOUNTER (OUTPATIENT)
Dept: CT IMAGING | Facility: HOSPITAL | Age: 34
Discharge: HOME | End: 2019-10-15

## 2019-10-15 VITALS
HEART RATE: 61 BPM | TEMPERATURE: 97.9 F | BODY MASS INDEX: 30.73 KG/M2 | SYSTOLIC BLOOD PRESSURE: 106 MMHG | DIASTOLIC BLOOD PRESSURE: 68 MMHG | HEIGHT: 67 IN | OXYGEN SATURATION: 95 % | RESPIRATION RATE: 16 BRPM | WEIGHT: 195.8 LBS

## 2019-10-15 DIAGNOSIS — D32.9 MENINGIOMA (HCC): ICD-10-CM

## 2019-10-15 DIAGNOSIS — G40.909 SEIZURE DISORDER (HCC): Primary | ICD-10-CM

## 2019-10-15 LAB
ALBUMIN SERPL-MCNC: 4.2 G/DL (ref 3.5–5.2)
ALBUMIN/GLOB SERPL: 1.8 G/DL
ALP SERPL-CCNC: 66 U/L (ref 39–117)
ALT SERPL W P-5'-P-CCNC: 10 U/L (ref 1–33)
ANION GAP SERPL CALCULATED.3IONS-SCNC: 9.9 MMOL/L (ref 5–15)
AST SERPL-CCNC: 8 U/L (ref 1–32)
BACTERIA UR QL AUTO: ABNORMAL /HPF
BASOPHILS # BLD AUTO: 0.02 10*3/MM3 (ref 0–0.2)
BASOPHILS NFR BLD AUTO: 0.4 % (ref 0–1.5)
BILIRUB SERPL-MCNC: 0.2 MG/DL (ref 0.2–1.2)
BILIRUB UR QL STRIP: NEGATIVE
BUN BLD-MCNC: 7 MG/DL (ref 6–20)
BUN/CREAT SERPL: 10.3 (ref 7–25)
CALCIUM SPEC-SCNC: 8.7 MG/DL (ref 8.6–10.5)
CHLORIDE SERPL-SCNC: 97 MMOL/L (ref 98–107)
CLARITY UR: ABNORMAL
CO2 SERPL-SCNC: 29.1 MMOL/L (ref 22–29)
COLOR UR: ABNORMAL
CREAT BLD-MCNC: 0.68 MG/DL (ref 0.57–1)
DEPRECATED RDW RBC AUTO: 38.3 FL (ref 37–54)
EOSINOPHIL # BLD AUTO: 0 10*3/MM3 (ref 0–0.4)
EOSINOPHIL NFR BLD AUTO: 0 % (ref 0.3–6.2)
ERYTHROCYTE [DISTWIDTH] IN BLOOD BY AUTOMATED COUNT: 12.8 % (ref 12.3–15.4)
GFR SERPL CREATININE-BSD FRML MDRD: 100 ML/MIN/1.73
GLOBULIN UR ELPH-MCNC: 2.3 GM/DL
GLUCOSE BLD-MCNC: 94 MG/DL (ref 65–99)
GLUCOSE UR STRIP-MCNC: NEGATIVE MG/DL
HCG SERPL QL: NEGATIVE
HCT VFR BLD AUTO: 37.9 % (ref 34–46.6)
HGB BLD-MCNC: 12.3 G/DL (ref 12–15.9)
HGB UR QL STRIP.AUTO: ABNORMAL
HYALINE CASTS UR QL AUTO: ABNORMAL /LPF
IMM GRANULOCYTES # BLD AUTO: 0.03 10*3/MM3 (ref 0–0.05)
IMM GRANULOCYTES NFR BLD AUTO: 0.6 % (ref 0–0.5)
KETONES UR QL STRIP: ABNORMAL
LEUKOCYTE ESTERASE UR QL STRIP.AUTO: ABNORMAL
LYMPHOCYTES # BLD AUTO: 1.33 10*3/MM3 (ref 0.7–3.1)
LYMPHOCYTES NFR BLD AUTO: 25 % (ref 19.6–45.3)
MCH RBC QN AUTO: 27.3 PG (ref 26.6–33)
MCHC RBC AUTO-ENTMCNC: 32.5 G/DL (ref 31.5–35.7)
MCV RBC AUTO: 84 FL (ref 79–97)
MONOCYTES # BLD AUTO: 0.55 10*3/MM3 (ref 0.1–0.9)
MONOCYTES NFR BLD AUTO: 10.3 % (ref 5–12)
NEUTROPHILS # BLD AUTO: 3.39 10*3/MM3 (ref 1.7–7)
NEUTROPHILS NFR BLD AUTO: 63.7 % (ref 42.7–76)
NITRITE UR QL STRIP: NEGATIVE
NRBC BLD AUTO-RTO: 0 /100 WBC (ref 0–0.2)
PH UR STRIP.AUTO: 6 [PH] (ref 5–8)
PLATELET # BLD AUTO: 226 10*3/MM3 (ref 140–450)
PMV BLD AUTO: 8.4 FL (ref 6–12)
POTASSIUM BLD-SCNC: 3.4 MMOL/L (ref 3.5–5.2)
PROT SERPL-MCNC: 6.5 G/DL (ref 6–8.5)
PROT UR QL STRIP: ABNORMAL
RBC # BLD AUTO: 4.51 10*6/MM3 (ref 3.77–5.28)
RBC # UR: ABNORMAL /HPF
REF LAB TEST METHOD: ABNORMAL
SODIUM BLD-SCNC: 136 MMOL/L (ref 136–145)
SP GR UR STRIP: >=1.03 (ref 1–1.03)
SQUAMOUS #/AREA URNS HPF: ABNORMAL /HPF
UROBILINOGEN UR QL STRIP: ABNORMAL
WBC NRBC COR # BLD: 5.32 10*3/MM3 (ref 3.4–10.8)
WBC UR QL AUTO: ABNORMAL /HPF

## 2019-10-15 PROCEDURE — 99284 EMERGENCY DEPT VISIT MOD MDM: CPT

## 2019-10-15 PROCEDURE — 70460 CT HEAD/BRAIN W/DYE: CPT

## 2019-10-15 PROCEDURE — 70470 CT HEAD/BRAIN W/O & W/DYE: CPT

## 2019-10-15 PROCEDURE — 25010000003 LEVETIRACETAM IN NACL 0.75% 1000 MG/100ML SOLUTION: Performed by: PHYSICIAN ASSISTANT

## 2019-10-15 PROCEDURE — 70450 CT HEAD/BRAIN W/O DYE: CPT

## 2019-10-15 PROCEDURE — 80183 DRUG SCRN QUANT OXCARBAZEPIN: CPT | Performed by: PHYSICIAN ASSISTANT

## 2019-10-15 PROCEDURE — 84703 CHORIONIC GONADOTROPIN ASSAY: CPT | Performed by: PHYSICIAN ASSISTANT

## 2019-10-15 PROCEDURE — 85025 COMPLETE CBC W/AUTO DIFF WBC: CPT | Performed by: PHYSICIAN ASSISTANT

## 2019-10-15 PROCEDURE — 80053 COMPREHEN METABOLIC PANEL: CPT | Performed by: PHYSICIAN ASSISTANT

## 2019-10-15 PROCEDURE — 81001 URINALYSIS AUTO W/SCOPE: CPT | Performed by: PHYSICIAN ASSISTANT

## 2019-10-15 PROCEDURE — 96374 THER/PROPH/DIAG INJ IV PUSH: CPT

## 2019-10-15 PROCEDURE — 25010000002 IOPAMIDOL 61 % SOLUTION: Performed by: NEUROLOGICAL SURGERY

## 2019-10-15 RX ORDER — LEVETIRACETAM 500 MG/1
500 TABLET ORAL 2 TIMES DAILY
Qty: 60 TABLET | Refills: 0 | Status: SHIPPED | OUTPATIENT
Start: 2019-10-15 | End: 2019-10-21 | Stop reason: HOSPADM

## 2019-10-15 RX ORDER — LEVETIRACETAM 10 MG/ML
1000 INJECTION INTRAVASCULAR ONCE
Status: COMPLETED | OUTPATIENT
Start: 2019-10-15 | End: 2019-10-15

## 2019-10-15 RX ORDER — SODIUM CHLORIDE 0.9 % (FLUSH) 0.9 %
10 SYRINGE (ML) INJECTION AS NEEDED
Status: DISCONTINUED | OUTPATIENT
Start: 2019-10-15 | End: 2019-10-15 | Stop reason: HOSPADM

## 2019-10-15 RX ORDER — OXCARBAZEPINE 600 MG/1
TABLET, FILM COATED ORAL
Qty: 30 TABLET | Refills: 0 | Status: ON HOLD | OUTPATIENT
Start: 2019-10-15 | End: 2019-10-16

## 2019-10-15 RX ADMIN — IOPAMIDOL 100 ML: 612 INJECTION, SOLUTION INTRAVENOUS at 14:21

## 2019-10-15 RX ADMIN — SODIUM CHLORIDE 1000 ML: 9 INJECTION, SOLUTION INTRAVENOUS at 03:53

## 2019-10-15 RX ADMIN — SODIUM CHLORIDE 1000 ML: 9 INJECTION, SOLUTION INTRAVENOUS at 00:50

## 2019-10-15 RX ADMIN — LEVETIRACETAM 1000 MG: 10 INJECTION INTRAVENOUS at 00:50

## 2019-10-15 NOTE — ED PROVIDER NOTES
"Pt presents to the ED c/o multiple seizures that occurred earlier this evening. Per family, the patient is on 600 BID of Trileptal. Per family, the patient has \"nodding, facial ticks, and she stares\" when she has her seizures. She was recently diagnosed with a right frontal meningioma. Dr. Coats follows the patient. She is scheduled for surgery on 10/16/19.     On exam,   Pt is resting comfortably, in no distress, and without focal neurologic deficit. Cardiovascular exam is within normal limits without murmur. No meningeal signs. No nugal rigidity.     Plan: Order and review CT head.     Attestation:  The UNIQUE and I have discussed this patient's history, physical exam, and treatment plan.  I have reviewed the documentation and personally had a face to face interaction with the patient. I affirm the documentation and agree with the treatment and plan.  The attached note describes my personal findings.       Documentation assistance provided by marcia Lewis for Dr. Cyrus MD.  Information recorded by the scribe was done at my direction and has been verified and validated by me.       Louise Lewis  10/15/19 [id number]       Pancho Bridges MD  10/16/19 [id number]    "

## 2019-10-15 NOTE — DISCHARGE INSTRUCTIONS
Home, rest, medicine as directed, home medicine as prescribed, follow up with PCP for recheck. Return to care with further concerns.

## 2019-10-15 NOTE — ED PROVIDER NOTES
"   EMERGENCY DEPARTMENT ENCOUNTER    CHIEF COMPLAINT  Chief Complaint: Seizure  History given by: self, pt's mother  History limited by: nothing  Room Number:   PMD: Guy Kelly MD  Neurology: Dr. Coats     HPI:  Pt is a 33 y.o. female who presents with recent diagnosis of a R frontal meningioma, complaining of seizure activity that occurred tonight. Pt also c/o fatigue, jaw pain and myalgia. Pt denies HA, nausea, vomiting and dysuria. The pt has been followed by Dr. Coats (Neurology) for a R frontotemporal meningioma that is suspected to be causing the pt's seizure activity. Per the pt's mother, the pt had a seizure this evening and was seen to be \"starring off\" with tremulous hands and a clenched jaw. The episode lasted longer than normal, but it is unknown how long the pt was seizing. Due to the prolonged period, the pt was brought to the ED for further evaluation. It was reported the pt had another episode while in the ED waiting room. The pt states she currently has slight jaw pain, myalgia and fatigued. She is scheduled on 10/16/19 for a right frontotemporal craniotomy of the tumor. Pt is compliant with all medications. Hx of POTS, seizures and atrial flutter.     Duration:  This evening   Onset: gradual  Timin episodes  Location: neurological  Quality: seizure  Intensity/Severity: moderate  Progression: improved  Associated Symptoms: fatigue, jaw pain and myalgia  Aggravating Factors: none stated  Alleviating Factors: none stated  Previous Episodes: Pt has hx of seizure activity  Treatment before arrival: none stated    PAST MEDICAL HISTORY  Active Ambulatory Problems     Diagnosis Date Noted   • Meningioma (CMS/HCC) 2017   • Paresthesia 2018   • Atypical migraine 2018   • Hypokalemia 2018   • Migraine aura without headache 2018   • Cervical radiculopathy 2018   • Cerebral meningioma (CMS/HCC) 2018     Resolved Ambulatory Problems     Diagnosis Date Noted "   • No Resolved Ambulatory Problems     Past Medical History:   Diagnosis Date   • Abnormal menses    • Anxiety and depression    • Atrial flutter (CMS/HCC)    • Bartholin cyst    • Brain tumor (CMS/HCC)    • Cervical pain (neck)    • Cyst, sinus nasal    • Dumping syndrome    • Heart murmur    • History of anemia    • Meningioma (CMS/HCC)    • Mid back pain    • Migraines    • POTS (postural orthostatic tachycardia syndrome)    • Seizures (CMS/HCC)    • SOB (shortness of breath)    • Syncope and collapse    • Thyroid nodule        PAST SURGICAL HISTORY  Past Surgical History:   Procedure Laterality Date   • FINGER SURGERY      Cyst removal       FAMILY HISTORY  Family History   Problem Relation Age of Onset   • No Known Problems Mother    • No Known Problems Father    • Malig Hyperthermia Neg Hx        SOCIAL HISTORY  Social History     Socioeconomic History   • Marital status: Single     Spouse name: Not on file   • Number of children: 1   • Years of education: College   • Highest education level: Not on file   Occupational History   • Occupation: Unemployed   Tobacco Use   • Smoking status: Current Some Day Smoker     Types: Cigarettes   • Smokeless tobacco: Never Used   • Tobacco comment: TRYING TO QUIT/SMOKES 2 CIGS PER DAY   Substance and Sexual Activity   • Alcohol use: No   • Drug use: No   • Sexual activity: Defer       ALLERGIES  Cefdinir and Penicillins    REVIEW OF SYSTEMS  Review of Systems   Constitutional: Positive for fatigue. Negative for fever.   HENT: Negative for sore throat.         (+) Jaw pain   Eyes: Negative.    Respiratory: Negative for cough and shortness of breath.    Cardiovascular: Negative for chest pain.   Gastrointestinal: Negative for abdominal pain, diarrhea and vomiting.   Genitourinary: Negative for dysuria.   Musculoskeletal: Positive for myalgias. Negative for neck pain.   Skin: Negative for rash.   Allergic/Immunologic: Negative.    Neurological: Positive for seizures.  Negative for weakness, numbness and headaches.   Hematological: Negative.    Psychiatric/Behavioral: Negative.    All other systems reviewed and are negative.      PHYSICAL EXAM  ED Triage Vitals [10/15/19 0008]   Temp Heart Rate Resp BP SpO2   97.9 °F (36.6 °C) 90 16 -- 94 %      Temp src Heart Rate Source Patient Position BP Location FiO2 (%)   Tympanic Monitor -- -- --       Physical Exam   Constitutional: She is oriented to person, place, and time. No distress.   HENT:   Head: Normocephalic and atraumatic.   Eyes: EOM are normal. Pupils are equal, round, and reactive to light.   Neck: Normal range of motion. Neck supple.   Cardiovascular: Normal rate, regular rhythm and normal heart sounds.   Pulmonary/Chest: Effort normal and breath sounds normal. No respiratory distress.   Abdominal: Soft. There is no tenderness. There is no rebound and no guarding.   Musculoskeletal: Normal range of motion. She exhibits no edema.   Neurological: She is alert and oriented to person, place, and time. She has normal sensation and normal strength. No cranial nerve deficit.   Upon first entering the room, the pt was postictal however became more alert as the exam continued. No focal neurological deficits.    Skin: Skin is warm and dry. No rash noted.   Psychiatric: Mood and affect normal.   Nursing note and vitals reviewed.      LAB RESULTS  Lab Results (last 24 hours)     Procedure Component Value Units Date/Time    CBC & Differential [[id number]] Collected:  10/15/19 0038    Specimen:  Blood Updated:  10/15/19 0052    Narrative:       The following orders were created for panel order CBC & Differential.  Procedure                               Abnormality         Status                     ---------                               -----------         ------                     CBC Auto Differential[[id number]]        Abnormal            Final result                 Please view results for these tests on the individual orders.     Comprehensive Metabolic Panel [[id number]]  (Abnormal) Collected:  10/15/19 0038    Specimen:  Blood Updated:  10/15/19 0114     Glucose 94 mg/dL      BUN 7 mg/dL      Creatinine 0.68 mg/dL      Sodium 136 mmol/L      Potassium 3.4 mmol/L      Chloride 97 mmol/L      CO2 29.1 mmol/L      Calcium 8.7 mg/dL      Total Protein 6.5 g/dL      Albumin 4.20 g/dL      ALT (SGPT) 10 U/L      AST (SGOT) 8 U/L      Alkaline Phosphatase 66 U/L      Total Bilirubin 0.2 mg/dL      eGFR Non African Amer 100 mL/min/1.73      Globulin 2.3 gm/dL      A/G Ratio 1.8 g/dL      BUN/Creatinine Ratio 10.3     Anion Gap 9.9 mmol/L     Narrative:       GFR Normal >60  Chronic Kidney Disease <60  Kidney Failure <15    hCG, Serum, Qualitative [[id number]]  (Normal) Collected:  10/15/19 0038    Specimen:  Blood Updated:  10/15/19 0109     HCG Qualitative Negative    Oxcarbazepine Level [[id number]] Collected:  10/15/19 0038    Specimen:  Blood Updated:  10/15/19 0047    CBC Auto Differential [[id number]]  (Abnormal) Collected:  10/15/19 0038    Specimen:  Blood Updated:  10/15/19 0052     WBC 5.32 10*3/mm3      RBC 4.51 10*6/mm3      Hemoglobin 12.3 g/dL      Hematocrit 37.9 %      MCV 84.0 fL      MCH 27.3 pg      MCHC 32.5 g/dL      RDW 12.8 %      RDW-SD 38.3 fl      MPV 8.4 fL      Platelets 226 10*3/mm3      Neutrophil % 63.7 %      Lymphocyte % 25.0 %      Monocyte % 10.3 %      Eosinophil % 0.0 %      Basophil % 0.4 %      Immature Grans % 0.6 %      Neutrophils, Absolute 3.39 10*3/mm3      Lymphocytes, Absolute 1.33 10*3/mm3      Monocytes, Absolute 0.55 10*3/mm3      Eosinophils, Absolute 0.00 10*3/mm3      Basophils, Absolute 0.02 10*3/mm3      Immature Grans, Absolute 0.03 10*3/mm3      nRBC 0.0 /100 WBC     Urinalysis With Microscopic If Indicated (No Culture) - Urine, Clean Catch [[id number]]  (Abnormal) Collected:  10/15/19 0143    Specimen:  Urine, Clean Catch Updated:  10/15/19 0209     Color, UA Dark Yellow     Appearance, UA Cloudy      pH, UA 6.0     Specific Gravity, UA >=1.030     Glucose, UA Negative     Ketones, UA Trace     Bilirubin, UA Negative     Blood, UA Trace     Protein, UA 30 mg/dL (1+)     Leuk Esterase, UA Small (1+)     Nitrite, UA Negative     Urobilinogen, UA 1.0 E.U./dL    Urinalysis, Microscopic Only - Urine, Clean Catch [[id number]]  (Abnormal) Collected:  10/15/19 0143    Specimen:  Urine, Clean Catch Updated:  10/15/19 0221     RBC, UA 0-2 /HPF      WBC, UA 13-20 /HPF      Bacteria, UA 2+ /HPF      Squamous Epithelial Cells, UA 13-20 /HPF      Hyaline Casts, UA 21-30 /LPF      Methodology Manual Light Microscopy          I ordered the above labs and reviewed the results    RADIOLOGY  CT Head Without Contrast   Final Result   No acute findings.       Radiation dose reduction techniques were utilized, including automated   exposure control and exposure modulation based on body size.       This report was finalized on 10/15/2019 3:03 AM by Dr. Bianka Fitzgerald M.D.               I ordered the above noted radiological studies. Interpreted by radiologist. Reviewed by me in PACS.     PROGRESS AND CONSULTS     0033 Keppra ordered. Oxcarbazepine level ordered. CMP ordered. HCG ordered. UA ordered. Labs ordered.     0058 Reviewed pt's history and workup with Dr. Bridges.  After a bedside evaluation; Dr Bridges agrees with the plan of care    0100 Rechecked pt. Pt is resting comfortably with HR of 71 and BP of 110/70. The pt is awake, alert and stable at this time.     0345 Rechecked pt. Pt is resting comfortably with HR of 54 and BP of 85/60. Discussed w/pt's mother results of CT Head show no acute changes. Plan is to d/c pt after she receives remaining fluids. Pt's mother understands and agrees with the plan. All questions were answered.     MEDICAL DECISION MAKING  Results were reviewed/discussed with the patient and they were also made aware of online access. Pt also made aware that some labs, such as cultures, will not be  resulted during ER visit and follow up with PMD is necessary.       DIAGNOSIS  Final diagnoses:   Seizure disorder (CMS/HCC)   Meningioma (CMS/HCC)       DISPOSITION  DISCHARGE    Patient discharged in stable condition.    Reviewed implications of results, diagnosis, meds, responsibility to follow up, warning signs and symptoms of possible worsening, potential complications and reasons to return to ER.    Patient/Family voiced understanding of above instructions.    Discussed plan for discharge, as there is no emergent indication for admission. Patient referred to primary care provider for BP management due to today's BP. Pt/family is agreeable and understands need for follow up and repeat testing.  Pt is aware that discharge does not mean that nothing is wrong but it indicates no emergency is present that requires admission and they must continue care with follow-up as given below or physician of their choice.     FOLLOW-UP  Nas Coats MD  [address] [id number]  [phone]    Schedule an appointment as soon as possible for a visit            Medication List      New Prescriptions    levETIRAcetam 500 MG tablet  Commonly known as:  KEPPRA  Take 1 tablet by mouth 2 (Two) Times a Day.        Stop    ADDERALL 30 MG tablet  Generic drug:  amphetamine-dextroamphetamine          Latest Documented Vital Signs:  As of 5:12 AM  BP- 106/68 HR- 61 Temp- 97.9 °F (36.6 °C) (Tympanic) O2 sat- 95%    --  Documentation assistance provided by marcia Ramirez for Mo Diamond.  Information recorded by the marcia was done at my direction and has been verified and validated by me.       James Byrd  10/15/19 0429       Mo Diamond PA  10/15/19 8001

## 2019-10-15 NOTE — ED TRIAGE NOTES
Pt to ER via PV for seizures tonight. Pt reports she is scheduled to have brain tumor removed on Wednesday but has been having real bad headaches and seizures.  Pt is slow to respond but is awake and was able to walk into ER without assistance

## 2019-10-16 ENCOUNTER — HOSPITAL ENCOUNTER (INPATIENT)
Facility: HOSPITAL | Age: 34
LOS: 5 days | Discharge: HOME | End: 2019-10-21
Attending: NEUROLOGICAL SURGERY | Admitting: NEUROLOGICAL SURGERY

## 2019-10-16 ENCOUNTER — ANESTHESIA EVENT (OUTPATIENT)
Dept: PERIOP | Facility: HOSPITAL | Age: 34
End: 2019-10-16

## 2019-10-16 ENCOUNTER — ANESTHESIA (OUTPATIENT)
Dept: PERIOP | Facility: HOSPITAL | Age: 34
End: 2019-10-16

## 2019-10-16 DIAGNOSIS — D32.9 MENINGIOMA (HCC): ICD-10-CM

## 2019-10-16 LAB
BASOPHILS # BLD AUTO: 0.01 10*3/MM3 (ref 0–0.2)
BASOPHILS NFR BLD AUTO: 0.2 % (ref 0–1.5)
DEPRECATED RDW RBC AUTO: 38.6 FL (ref 37–54)
EOSINOPHIL # BLD AUTO: 0 10*3/MM3 (ref 0–0.4)
EOSINOPHIL NFR BLD AUTO: 0 % (ref 0.3–6.2)
ERYTHROCYTE [DISTWIDTH] IN BLOOD BY AUTOMATED COUNT: 12.7 % (ref 12.3–15.4)
GLUCOSE BLDC GLUCOMTR-MCNC: 123 MG/DL (ref 70–130)
GLUCOSE BLDC GLUCOMTR-MCNC: 160 MG/DL (ref 70–130)
HCT VFR BLD AUTO: 32.6 % (ref 34–46.6)
HGB BLD-MCNC: 10.7 G/DL (ref 12–15.9)
IMM GRANULOCYTES # BLD AUTO: 0.04 10*3/MM3 (ref 0–0.05)
IMM GRANULOCYTES NFR BLD AUTO: 0.7 % (ref 0–0.5)
LYMPHOCYTES # BLD AUTO: 0.33 10*3/MM3 (ref 0.7–3.1)
LYMPHOCYTES NFR BLD AUTO: 5.5 % (ref 19.6–45.3)
MCH RBC QN AUTO: 27.6 PG (ref 26.6–33)
MCHC RBC AUTO-ENTMCNC: 32.8 G/DL (ref 31.5–35.7)
MCV RBC AUTO: 84.2 FL (ref 79–97)
MONOCYTES # BLD AUTO: 0.13 10*3/MM3 (ref 0.1–0.9)
MONOCYTES NFR BLD AUTO: 2.2 % (ref 5–12)
NEUTROPHILS # BLD AUTO: 5.53 10*3/MM3 (ref 1.7–7)
NEUTROPHILS NFR BLD AUTO: 91.4 % (ref 42.7–76)
NRBC BLD AUTO-RTO: 0 /100 WBC (ref 0–0.2)
PLATELET # BLD AUTO: 199 10*3/MM3 (ref 140–450)
PMV BLD AUTO: 8.6 FL (ref 6–12)
RBC # BLD AUTO: 3.87 10*6/MM3 (ref 3.77–5.28)
WBC NRBC COR # BLD: 6.04 10*3/MM3 (ref 3.4–10.8)

## 2019-10-16 PROCEDURE — 69990 MICROSURGERY ADD-ON: CPT | Performed by: SPECIALIST/TECHNOLOGIST, OTHER

## 2019-10-16 PROCEDURE — 25010000002 MIDAZOLAM PER 1 MG: Performed by: ANESTHESIOLOGY

## 2019-10-16 PROCEDURE — 61512 CRNEC TREPH EXC MNGIOMA STTL: CPT | Performed by: SPECIALIST/TECHNOLOGIST, OTHER

## 2019-10-16 PROCEDURE — 8E09XBZ COMPUTER ASSISTED PROCEDURE OF HEAD AND NECK REGION: ICD-10-PCS | Performed by: NEUROLOGICAL SURGERY

## 2019-10-16 PROCEDURE — 25010000002 PHENYLEPHRINE PER 1 ML: Performed by: NURSE ANESTHETIST, CERTIFIED REGISTERED

## 2019-10-16 PROCEDURE — 88341 IMHCHEM/IMCYTCHM EA ADD ANTB: CPT | Performed by: NEUROLOGICAL SURGERY

## 2019-10-16 PROCEDURE — 80048 BASIC METABOLIC PNL TOTAL CA: CPT | Performed by: NEUROLOGICAL SURGERY

## 2019-10-16 PROCEDURE — 25810000003 SODIUM CHLORIDE 0.9 % WITH KCL 20 MEQ 20-0.9 MEQ/L-% SOLUTION: Performed by: NEUROLOGICAL SURGERY

## 2019-10-16 PROCEDURE — 00B10ZZ EXCISION OF CEREBRAL MENINGES, OPEN APPROACH: ICD-10-PCS | Performed by: NEUROLOGICAL SURGERY

## 2019-10-16 PROCEDURE — 25010000002 HYDROMORPHONE PER 4 MG: Performed by: ANESTHESIOLOGY

## 2019-10-16 PROCEDURE — 25010000002 FENTANYL CITRATE (PF) 100 MCG/2ML SOLUTION: Performed by: ANESTHESIOLOGY

## 2019-10-16 PROCEDURE — 25010000002 NEOSTIGMINE 10 MG/10ML SOLUTION: Performed by: NURSE ANESTHETIST, CERTIFIED REGISTERED

## 2019-10-16 PROCEDURE — C1713 ANCHOR/SCREW BN/BN,TIS/BN: HCPCS | Performed by: NEUROLOGICAL SURGERY

## 2019-10-16 PROCEDURE — 69990 MICROSURGERY ADD-ON: CPT | Performed by: NEUROLOGICAL SURGERY

## 2019-10-16 PROCEDURE — 25010000002 ONDANSETRON PER 1 MG: Performed by: NURSE ANESTHETIST, CERTIFIED REGISTERED

## 2019-10-16 PROCEDURE — 88313 SPECIAL STAINS GROUP 2: CPT | Performed by: NEUROLOGICAL SURGERY

## 2019-10-16 PROCEDURE — 61781 SCAN PROC CRANIAL INTRA: CPT | Performed by: NEUROLOGICAL SURGERY

## 2019-10-16 PROCEDURE — 25010000002 DEXAMETHASONE PER 1 MG: Performed by: ANESTHESIOLOGY

## 2019-10-16 PROCEDURE — 88360 TUMOR IMMUNOHISTOCHEM/MANUAL: CPT | Performed by: NEUROLOGICAL SURGERY

## 2019-10-16 PROCEDURE — 88307 TISSUE EXAM BY PATHOLOGIST: CPT | Performed by: NEUROLOGICAL SURGERY

## 2019-10-16 PROCEDURE — 82962 GLUCOSE BLOOD TEST: CPT

## 2019-10-16 PROCEDURE — 88342 IMHCHEM/IMCYTCHM 1ST ANTB: CPT | Performed by: NEUROLOGICAL SURGERY

## 2019-10-16 PROCEDURE — 61512 CRNEC TREPH EXC MNGIOMA STTL: CPT | Performed by: NEUROLOGICAL SURGERY

## 2019-10-16 PROCEDURE — 25010000002 PROPOFOL 10 MG/ML EMULSION: Performed by: ANESTHESIOLOGY

## 2019-10-16 PROCEDURE — 25010000002 VANCOMYCIN PER 500 MG: Performed by: NEUROLOGICAL SURGERY

## 2019-10-16 PROCEDURE — 85025 COMPLETE CBC W/AUTO DIFF WBC: CPT | Performed by: NEUROLOGICAL SURGERY

## 2019-10-16 PROCEDURE — 25010000002 HYDROMORPHONE PER 4 MG: Performed by: NEUROLOGICAL SURGERY

## 2019-10-16 DEVICE — CRANIOFIX 2 TITANIUM CLAMP 11MM
Type: IMPLANTABLE DEVICE | Site: CRANIAL | Status: FUNCTIONAL
Brand: CRANIOFIX2

## 2019-10-16 DEVICE — DURAGEN® PLUS DURAL REGENERATION MATRIX, 2 IN X 2 IN (5 CM X 5 CM)
Type: IMPLANTABLE DEVICE | Site: BRAIN | Status: FUNCTIONAL
Brand: DURAGEN® PLUS

## 2019-10-16 DEVICE — CRANIOFIX 2 TITANIUM CLAMP 20MM
Type: IMPLANTABLE DEVICE | Site: CRANIAL | Status: FUNCTIONAL
Brand: CRANIOFIX2

## 2019-10-16 RX ORDER — ACETAMINOPHEN 325 MG/1
650 TABLET ORAL ONCE AS NEEDED
Status: DISCONTINUED | OUTPATIENT
Start: 2019-10-16 | End: 2019-10-16 | Stop reason: HOSPADM

## 2019-10-16 RX ORDER — PROMETHAZINE HYDROCHLORIDE 25 MG/ML
6.25 INJECTION, SOLUTION INTRAMUSCULAR; INTRAVENOUS
Status: DISCONTINUED | OUTPATIENT
Start: 2019-10-16 | End: 2019-10-16 | Stop reason: HOSPADM

## 2019-10-16 RX ORDER — SODIUM CHLORIDE 9 MG/ML
INJECTION, SOLUTION INTRAVENOUS AS NEEDED
Status: DISCONTINUED | OUTPATIENT
Start: 2019-10-16 | End: 2019-10-16 | Stop reason: HOSPADM

## 2019-10-16 RX ORDER — DEXTROAMPHETAMINE SACCHARATE, AMPHETAMINE ASPARTATE, DEXTROAMPHETAMINE SULFATE AND AMPHETAMINE SULFATE 2.5; 2.5; 2.5; 2.5 MG/1; MG/1; MG/1; MG/1
30 TABLET ORAL 2 TIMES DAILY
Status: ON HOLD | COMMUNITY
End: 2019-10-21 | Stop reason: SDUPTHER

## 2019-10-16 RX ORDER — LEVETIRACETAM 500 MG/1
500 TABLET ORAL 2 TIMES DAILY
Status: DISCONTINUED | OUTPATIENT
Start: 2019-10-16 | End: 2019-10-21 | Stop reason: HOSPADM

## 2019-10-16 RX ORDER — NEOSTIGMINE METHYLSULFATE 1 MG/ML
INJECTION, SOLUTION INTRAVENOUS AS NEEDED
Status: DISCONTINUED | OUTPATIENT
Start: 2019-10-16 | End: 2019-10-16 | Stop reason: SURG

## 2019-10-16 RX ORDER — PROMETHAZINE HYDROCHLORIDE 25 MG/ML
12.5 INJECTION, SOLUTION INTRAMUSCULAR; INTRAVENOUS ONCE AS NEEDED
Status: DISCONTINUED | OUTPATIENT
Start: 2019-10-16 | End: 2019-10-16 | Stop reason: HOSPADM

## 2019-10-16 RX ORDER — FENTANYL CITRATE 50 UG/ML
50 INJECTION, SOLUTION INTRAMUSCULAR; INTRAVENOUS
Status: DISCONTINUED | OUTPATIENT
Start: 2019-10-16 | End: 2019-10-16 | Stop reason: HOSPADM

## 2019-10-16 RX ORDER — SODIUM CHLORIDE 0.9 % (FLUSH) 0.9 %
3-10 SYRINGE (ML) INJECTION AS NEEDED
Status: DISCONTINUED | OUTPATIENT
Start: 2019-10-16 | End: 2019-10-16 | Stop reason: HOSPADM

## 2019-10-16 RX ORDER — CLONAZEPAM 1 MG/1
1 TABLET ORAL 3 TIMES DAILY PRN
Status: DISCONTINUED | OUTPATIENT
Start: 2019-10-16 | End: 2019-10-21 | Stop reason: HOSPADM

## 2019-10-16 RX ORDER — NALOXONE HCL 0.4 MG/ML
0.2 VIAL (ML) INJECTION AS NEEDED
Status: DISCONTINUED | OUTPATIENT
Start: 2019-10-16 | End: 2019-10-16 | Stop reason: HOSPADM

## 2019-10-16 RX ORDER — LEVOTHYROXINE SODIUM 0.07 MG/1
75 TABLET ORAL
Status: DISCONTINUED | OUTPATIENT
Start: 2019-10-17 | End: 2019-10-21 | Stop reason: HOSPADM

## 2019-10-16 RX ORDER — FLUMAZENIL 0.1 MG/ML
0.2 INJECTION INTRAVENOUS AS NEEDED
Status: DISCONTINUED | OUTPATIENT
Start: 2019-10-16 | End: 2019-10-16 | Stop reason: HOSPADM

## 2019-10-16 RX ORDER — HYDROCODONE BITARTRATE AND ACETAMINOPHEN 7.5; 325 MG/1; MG/1
1 TABLET ORAL ONCE AS NEEDED
Status: COMPLETED | OUTPATIENT
Start: 2019-10-16 | End: 2019-10-16

## 2019-10-16 RX ORDER — DEXAMETHASONE SODIUM PHOSPHATE 10 MG/ML
INJECTION INTRAMUSCULAR; INTRAVENOUS AS NEEDED
Status: DISCONTINUED | OUTPATIENT
Start: 2019-10-16 | End: 2019-10-16 | Stop reason: SURG

## 2019-10-16 RX ORDER — PROPOFOL 10 MG/ML
VIAL (ML) INTRAVENOUS AS NEEDED
Status: DISCONTINUED | OUTPATIENT
Start: 2019-10-16 | End: 2019-10-16 | Stop reason: SURG

## 2019-10-16 RX ORDER — ONDANSETRON 2 MG/ML
4 INJECTION INTRAMUSCULAR; INTRAVENOUS ONCE AS NEEDED
Status: DISCONTINUED | OUTPATIENT
Start: 2019-10-16 | End: 2019-10-16 | Stop reason: HOSPADM

## 2019-10-16 RX ORDER — GLYCOPYRROLATE 0.2 MG/ML
INJECTION INTRAMUSCULAR; INTRAVENOUS AS NEEDED
Status: DISCONTINUED | OUTPATIENT
Start: 2019-10-16 | End: 2019-10-16 | Stop reason: SURG

## 2019-10-16 RX ORDER — ONDANSETRON 4 MG/1
4 TABLET, ORALLY DISINTEGRATING ORAL EVERY 8 HOURS PRN
Status: DISCONTINUED | OUTPATIENT
Start: 2019-10-16 | End: 2019-10-21 | Stop reason: HOSPADM

## 2019-10-16 RX ORDER — MIDAZOLAM HYDROCHLORIDE 1 MG/ML
1 INJECTION INTRAMUSCULAR; INTRAVENOUS
Status: DISCONTINUED | OUTPATIENT
Start: 2019-10-16 | End: 2019-10-16 | Stop reason: HOSPADM

## 2019-10-16 RX ORDER — PROMETHAZINE HYDROCHLORIDE 25 MG/1
25 TABLET ORAL ONCE AS NEEDED
Status: DISCONTINUED | OUTPATIENT
Start: 2019-10-16 | End: 2019-10-16 | Stop reason: HOSPADM

## 2019-10-16 RX ORDER — IBUPROFEN 200 MG
400 TABLET ORAL EVERY 6 HOURS PRN
COMMUNITY
End: 2019-10-21 | Stop reason: HOSPADM

## 2019-10-16 RX ORDER — FENTANYL CITRATE 50 UG/ML
INJECTION, SOLUTION INTRAMUSCULAR; INTRAVENOUS AS NEEDED
Status: DISCONTINUED | OUTPATIENT
Start: 2019-10-16 | End: 2019-10-16 | Stop reason: SURG

## 2019-10-16 RX ORDER — ROCURONIUM BROMIDE 10 MG/ML
INJECTION, SOLUTION INTRAVENOUS AS NEEDED
Status: DISCONTINUED | OUTPATIENT
Start: 2019-10-16 | End: 2019-10-16 | Stop reason: SURG

## 2019-10-16 RX ORDER — SODIUM CHLORIDE, SODIUM LACTATE, POTASSIUM CHLORIDE, CALCIUM CHLORIDE 600; 310; 30; 20 MG/100ML; MG/100ML; MG/100ML; MG/100ML
9 INJECTION, SOLUTION INTRAVENOUS CONTINUOUS
Status: DISCONTINUED | OUTPATIENT
Start: 2019-10-16 | End: 2019-10-16

## 2019-10-16 RX ORDER — SODIUM CHLORIDE, SODIUM ACETATE ANHYDROUS, SODIUM GLUCONATE, POTASSIUM CHLORIDE, AND MAGNESIUM CHLORIDE 526; 222; 502; 37; 30 MG/100ML; MG/100ML; MG/100ML; MG/100ML; MG/100ML
IRRIGANT IRRIGATION AS NEEDED
Status: DISCONTINUED | OUTPATIENT
Start: 2019-10-16 | End: 2019-10-16 | Stop reason: HOSPADM

## 2019-10-16 RX ORDER — FAMOTIDINE 10 MG/ML
20 INJECTION, SOLUTION INTRAVENOUS ONCE
Status: COMPLETED | OUTPATIENT
Start: 2019-10-16 | End: 2019-10-16

## 2019-10-16 RX ORDER — EPHEDRINE SULFATE 50 MG/ML
5 INJECTION, SOLUTION INTRAVENOUS ONCE AS NEEDED
Status: DISCONTINUED | OUTPATIENT
Start: 2019-10-16 | End: 2019-10-16 | Stop reason: HOSPADM

## 2019-10-16 RX ORDER — HYDROMORPHONE HYDROCHLORIDE 1 MG/ML
0.5 INJECTION, SOLUTION INTRAMUSCULAR; INTRAVENOUS; SUBCUTANEOUS
Status: DISCONTINUED | OUTPATIENT
Start: 2019-10-16 | End: 2019-10-18

## 2019-10-16 RX ORDER — HYDRALAZINE HYDROCHLORIDE 20 MG/ML
5 INJECTION INTRAMUSCULAR; INTRAVENOUS
Status: DISCONTINUED | OUTPATIENT
Start: 2019-10-16 | End: 2019-10-16 | Stop reason: HOSPADM

## 2019-10-16 RX ORDER — OXYCODONE AND ACETAMINOPHEN 7.5; 325 MG/1; MG/1
1 TABLET ORAL ONCE AS NEEDED
Status: DISCONTINUED | OUTPATIENT
Start: 2019-10-16 | End: 2019-10-16 | Stop reason: HOSPADM

## 2019-10-16 RX ORDER — NALOXONE HCL 0.4 MG/ML
0.4 VIAL (ML) INJECTION
Status: DISCONTINUED | OUTPATIENT
Start: 2019-10-16 | End: 2019-10-18

## 2019-10-16 RX ORDER — PROMETHAZINE HYDROCHLORIDE 25 MG/1
25 SUPPOSITORY RECTAL ONCE AS NEEDED
Status: DISCONTINUED | OUTPATIENT
Start: 2019-10-16 | End: 2019-10-16 | Stop reason: HOSPADM

## 2019-10-16 RX ORDER — MIDAZOLAM HYDROCHLORIDE 1 MG/ML
2 INJECTION INTRAMUSCULAR; INTRAVENOUS
Status: DISCONTINUED | OUTPATIENT
Start: 2019-10-16 | End: 2019-10-16 | Stop reason: HOSPADM

## 2019-10-16 RX ORDER — ONDANSETRON 2 MG/ML
INJECTION INTRAMUSCULAR; INTRAVENOUS AS NEEDED
Status: DISCONTINUED | OUTPATIENT
Start: 2019-10-16 | End: 2019-10-16 | Stop reason: SURG

## 2019-10-16 RX ORDER — OXCARBAZEPINE 300 MG/1
600 TABLET, FILM COATED ORAL EVERY 12 HOURS SCHEDULED
Status: DISCONTINUED | OUTPATIENT
Start: 2019-10-16 | End: 2019-10-19

## 2019-10-16 RX ORDER — SCOLOPAMINE TRANSDERMAL SYSTEM 1 MG/1
1 PATCH, EXTENDED RELEASE TRANSDERMAL
Status: DISCONTINUED | OUTPATIENT
Start: 2019-10-16 | End: 2019-10-16 | Stop reason: HOSPADM

## 2019-10-16 RX ORDER — SODIUM CHLORIDE AND POTASSIUM CHLORIDE 150; 900 MG/100ML; MG/100ML
100 INJECTION, SOLUTION INTRAVENOUS CONTINUOUS
Status: DISCONTINUED | OUTPATIENT
Start: 2019-10-16 | End: 2019-10-18

## 2019-10-16 RX ORDER — LABETALOL HYDROCHLORIDE 5 MG/ML
5 INJECTION, SOLUTION INTRAVENOUS
Status: DISCONTINUED | OUTPATIENT
Start: 2019-10-16 | End: 2019-10-16 | Stop reason: HOSPADM

## 2019-10-16 RX ORDER — VANCOMYCIN HYDROCHLORIDE 1 G/200ML
1000 INJECTION, SOLUTION INTRAVENOUS ONCE
Status: COMPLETED | OUTPATIENT
Start: 2019-10-16 | End: 2019-10-16

## 2019-10-16 RX ORDER — DIPHENHYDRAMINE HYDROCHLORIDE 50 MG/ML
12.5 INJECTION INTRAMUSCULAR; INTRAVENOUS
Status: DISCONTINUED | OUTPATIENT
Start: 2019-10-16 | End: 2019-10-16 | Stop reason: HOSPADM

## 2019-10-16 RX ORDER — HYDROMORPHONE HYDROCHLORIDE 1 MG/ML
0.5 INJECTION, SOLUTION INTRAMUSCULAR; INTRAVENOUS; SUBCUTANEOUS
Status: DISCONTINUED | OUTPATIENT
Start: 2019-10-16 | End: 2019-10-16 | Stop reason: HOSPADM

## 2019-10-16 RX ORDER — OXCARBAZEPINE 300 MG/1
600 TABLET, FILM COATED ORAL 2 TIMES DAILY
COMMUNITY
End: 2019-10-21 | Stop reason: HOSPADM

## 2019-10-16 RX ORDER — LIDOCAINE HYDROCHLORIDE 10 MG/ML
0.5 INJECTION, SOLUTION EPIDURAL; INFILTRATION; INTRACAUDAL; PERINEURAL ONCE AS NEEDED
Status: DISCONTINUED | OUTPATIENT
Start: 2019-10-16 | End: 2019-10-16 | Stop reason: HOSPADM

## 2019-10-16 RX ORDER — VANCOMYCIN HYDROCHLORIDE 1 G/200ML
1000 INJECTION, SOLUTION INTRAVENOUS EVERY 12 HOURS
Status: DISCONTINUED | OUTPATIENT
Start: 2019-10-17 | End: 2019-10-18

## 2019-10-16 RX ORDER — SODIUM CHLORIDE 0.9 % (FLUSH) 0.9 %
3 SYRINGE (ML) INJECTION EVERY 12 HOURS SCHEDULED
Status: DISCONTINUED | OUTPATIENT
Start: 2019-10-16 | End: 2019-10-16 | Stop reason: HOSPADM

## 2019-10-16 RX ORDER — ONDANSETRON 4 MG/1
4 TABLET, FILM COATED ORAL EVERY 6 HOURS PRN
Status: DISCONTINUED | OUTPATIENT
Start: 2019-10-16 | End: 2019-10-16

## 2019-10-16 RX ORDER — DIPHENHYDRAMINE HCL 25 MG
25 CAPSULE ORAL
Status: DISCONTINUED | OUTPATIENT
Start: 2019-10-16 | End: 2019-10-16 | Stop reason: HOSPADM

## 2019-10-16 RX ORDER — ONDANSETRON 2 MG/ML
4 INJECTION INTRAMUSCULAR; INTRAVENOUS EVERY 6 HOURS PRN
Status: DISCONTINUED | OUTPATIENT
Start: 2019-10-16 | End: 2019-10-21 | Stop reason: HOSPADM

## 2019-10-16 RX ORDER — HYDROCODONE BITARTRATE AND ACETAMINOPHEN 5; 325 MG/1; MG/1
1 TABLET ORAL EVERY 4 HOURS PRN
Status: DISCONTINUED | OUTPATIENT
Start: 2019-10-16 | End: 2019-10-19

## 2019-10-16 RX ORDER — VENLAFAXINE HYDROCHLORIDE 37.5 MG/1
37.5 CAPSULE, EXTENDED RELEASE ORAL NIGHTLY
Status: DISCONTINUED | OUTPATIENT
Start: 2019-10-16 | End: 2019-10-21 | Stop reason: HOSPADM

## 2019-10-16 RX ORDER — ONDANSETRON 4 MG/1
4 TABLET, FILM COATED ORAL EVERY 8 HOURS PRN
Status: DISCONTINUED | OUTPATIENT
Start: 2019-10-16 | End: 2019-10-21 | Stop reason: HOSPADM

## 2019-10-16 RX ADMIN — ROCURONIUM BROMIDE 50 MG: 10 INJECTION INTRAVENOUS at 15:35

## 2019-10-16 RX ADMIN — VENLAFAXINE HYDROCHLORIDE 37.5 MG: 37.5 CAPSULE, EXTENDED RELEASE ORAL at 22:43

## 2019-10-16 RX ADMIN — FENTANYL CITRATE 50 MCG: 50 INJECTION INTRAMUSCULAR; INTRAVENOUS at 16:11

## 2019-10-16 RX ADMIN — PROPOFOL 200 MG: 10 INJECTION, EMULSION INTRAVENOUS at 15:35

## 2019-10-16 RX ADMIN — FENTANYL CITRATE 50 MCG: 50 INJECTION, SOLUTION INTRAMUSCULAR; INTRAVENOUS at 13:39

## 2019-10-16 RX ADMIN — HYDROCODONE BITARTRATE AND ACETAMINOPHEN 1 TABLET: 5; 325 TABLET ORAL at 22:35

## 2019-10-16 RX ADMIN — FENTANYL CITRATE 50 MCG: 50 INJECTION INTRAMUSCULAR; INTRAVENOUS at 16:45

## 2019-10-16 RX ADMIN — HYDROCODONE BITARTRATE AND ACETAMINOPHEN 1 TABLET: 7.5; 325 TABLET ORAL at 20:12

## 2019-10-16 RX ADMIN — SODIUM CHLORIDE, POTASSIUM CHLORIDE, SODIUM LACTATE AND CALCIUM CHLORIDE 9 ML/HR: 600; 310; 30; 20 INJECTION, SOLUTION INTRAVENOUS at 15:19

## 2019-10-16 RX ADMIN — FENTANYL CITRATE 50 MCG: 50 INJECTION INTRAMUSCULAR; INTRAVENOUS at 15:35

## 2019-10-16 RX ADMIN — SODIUM CHLORIDE, POTASSIUM CHLORIDE, SODIUM LACTATE AND CALCIUM CHLORIDE: 600; 310; 30; 20 INJECTION, SOLUTION INTRAVENOUS at 17:55

## 2019-10-16 RX ADMIN — HYDROMORPHONE HYDROCHLORIDE 0.5 MG: 1 INJECTION, SOLUTION INTRAMUSCULAR; INTRAVENOUS; SUBCUTANEOUS at 18:40

## 2019-10-16 RX ADMIN — FENTANYL CITRATE 50 MCG: 50 INJECTION, SOLUTION INTRAMUSCULAR; INTRAVENOUS at 18:28

## 2019-10-16 RX ADMIN — POTASSIUM CHLORIDE AND SODIUM CHLORIDE 100 ML/HR: 900; 150 INJECTION, SOLUTION INTRAVENOUS at 22:46

## 2019-10-16 RX ADMIN — HYDROMORPHONE HYDROCHLORIDE 0.5 MG: 1 INJECTION, SOLUTION INTRAMUSCULAR; INTRAVENOUS; SUBCUTANEOUS at 19:36

## 2019-10-16 RX ADMIN — FENTANYL CITRATE 50 MCG: 50 INJECTION INTRAMUSCULAR; INTRAVENOUS at 17:58

## 2019-10-16 RX ADMIN — MIDAZOLAM 2 MG: 1 INJECTION INTRAMUSCULAR; INTRAVENOUS at 14:41

## 2019-10-16 RX ADMIN — LEVETIRACETAM 500 MG: 500 TABLET, FILM COATED ORAL at 22:42

## 2019-10-16 RX ADMIN — HYDROMORPHONE HYDROCHLORIDE 0.5 MG: 1 INJECTION, SOLUTION INTRAMUSCULAR; INTRAVENOUS; SUBCUTANEOUS at 19:06

## 2019-10-16 RX ADMIN — GLYCOPYRROLATE 0.4 MG: 0.2 INJECTION INTRAMUSCULAR; INTRAVENOUS at 17:06

## 2019-10-16 RX ADMIN — FAMOTIDINE 20 MG: 10 INJECTION INTRAVENOUS at 12:48

## 2019-10-16 RX ADMIN — SODIUM CHLORIDE, POTASSIUM CHLORIDE, SODIUM LACTATE AND CALCIUM CHLORIDE 9 ML/HR: 600; 310; 30; 20 INJECTION, SOLUTION INTRAVENOUS at 12:47

## 2019-10-16 RX ADMIN — SCOPOLAMINE 1 PATCH: 1 PATCH TRANSDERMAL at 12:47

## 2019-10-16 RX ADMIN — SODIUM CHLORIDE, PRESERVATIVE FREE 10 ML: 5 INJECTION INTRAVENOUS at 12:48

## 2019-10-16 RX ADMIN — EPHEDRINE SULFATE 20 MG: 50 INJECTION INTRAMUSCULAR; INTRAVENOUS; SUBCUTANEOUS at 16:41

## 2019-10-16 RX ADMIN — HYDROMORPHONE HYDROCHLORIDE 0.5 MG: 1 INJECTION, SOLUTION INTRAMUSCULAR; INTRAVENOUS; SUBCUTANEOUS at 23:47

## 2019-10-16 RX ADMIN — ROCURONIUM BROMIDE 15 MG: 10 INJECTION INTRAVENOUS at 16:19

## 2019-10-16 RX ADMIN — DEXAMETHASONE SODIUM PHOSPHATE 8 MG: 10 INJECTION INTRAMUSCULAR; INTRAVENOUS at 15:41

## 2019-10-16 RX ADMIN — FENTANYL CITRATE 50 MCG: 50 INJECTION, SOLUTION INTRAMUSCULAR; INTRAVENOUS at 18:17

## 2019-10-16 RX ADMIN — MIDAZOLAM 1 MG: 1 INJECTION INTRAMUSCULAR; INTRAVENOUS at 13:40

## 2019-10-16 RX ADMIN — FENTANYL CITRATE 50 MCG: 50 INJECTION INTRAMUSCULAR; INTRAVENOUS at 16:17

## 2019-10-16 RX ADMIN — PHENYLEPHRINE HYDROCHLORIDE 100 MCG: 10 INJECTION INTRAVENOUS at 16:42

## 2019-10-16 RX ADMIN — HYDROMORPHONE HYDROCHLORIDE 0.5 MG: 1 INJECTION, SOLUTION INTRAMUSCULAR; INTRAVENOUS; SUBCUTANEOUS at 19:25

## 2019-10-16 RX ADMIN — ONDANSETRON 4 MG: 2 INJECTION INTRAMUSCULAR; INTRAVENOUS at 16:58

## 2019-10-16 RX ADMIN — OXCARBAZEPINE 600 MG: 300 TABLET, FILM COATED ORAL at 22:43

## 2019-10-16 RX ADMIN — VANCOMYCIN HYDROCHLORIDE 1000 MG: 1 INJECTION, SOLUTION INTRAVENOUS at 13:37

## 2019-10-16 RX ADMIN — NEOSTIGMINE METHYLSULFATE 3 MG: 1 INJECTION INTRAVENOUS at 17:06

## 2019-10-16 NOTE — ANESTHESIA PREPROCEDURE EVALUATION
Anesthesia Evaluation     Patient summary reviewed and Nursing notes reviewed                Airway   Mallampati: III  Dental      Pulmonary    (+) a smoker Former, shortness of breath,   Cardiovascular     ECG reviewed  Rhythm: regular  Rate: normal        Neuro/Psych  (+) seizures, headaches, syncope, numbness, psychiatric history Depression and Anxiety,     GI/Hepatic/Renal/Endo - negative ROS     Musculoskeletal     (+) neck pain,   Abdominal    Substance History - negative use     OB/GYN negative ob/gyn ROS         Other                      Anesthesia Plan    ASA 3     general   (Left radial art line)  intravenous induction   Anesthetic plan, all risks, benefits, and alternatives have been provided, discussed and informed consent has been obtained with: patient.

## 2019-10-16 NOTE — ANESTHESIA PROCEDURE NOTES
Arterial Line    Pre-sedation assessment completed: 10/16/2019 12:38 PM    Patient reassessed immediately prior to procedure    Patient location during procedure: holding area  Start time: 10/16/2019 12:38 PM  Stop Time:10/16/2019 12:48 PM       Line placed for hemodynamic monitoring, ABGs/Labs/ISTAT and MD/Surgeon request.  Performed By   Anesthesiologist: Bala Coy MD  Preanesthetic Checklist  Completed: patient identified, surgical consent, pre-op evaluation, timeout performed, IV checked, risks and benefits discussed and monitors and equipment checked  Arterial Line Prep   Sterile Tech: cap, gloves, gown, mask and sterile barriers  Prep: ChloraPrep  Patient monitoring: blood pressure monitoring, continuous pulse oximetry and EKG  Arterial Line Procedure   Laterality:left  Location:  radial artery  Catheter size: 20 G   Guidance: ultrasound guided  PROCEDURE NOTE/ULTRASOUND INTERPRETATION.  Using ultrasound guidance the potential vascular sites for insertion of the catheter were visualized to determine the patency of the vessel to be used for vascular access.  After selecting the appropriate site for insertion, the needle was visualized under ultrasound being inserted into the radial artery, followed by ultrasound confirmation of wire and catheter placement. There were no abnormalities seen on ultrasound; an image was taken; and the patient tolerated the procedure with no complications.   Number of attempts: 1  Successful placement: yes  Post Assessment   Dressing Type: occlusive dressing applied, secured with tape and wrist guard applied.   Complications no  Circ/Move/Sens Assessment: normal.   Patient Tolerance: patient tolerated the procedure well with no apparent complications             Other, specify...

## 2019-10-16 NOTE — OP NOTE
Preoperative diagnosis: Right temporal meningioma    Postoperative diagnosis: Same    Procedure performed: Right frontotemporal craniotomy with gross total removal of a right temporal meningioma    Surgeon: Nas Coats M.D.    Asst.: Charisse Ortega CFA who was instrumental in helping with hemostasis, visualization of neural structures and retraction of neural structures    Estimated blood loss, crystalloid, colloid, blood: Please see anesthesia record    Material to lab:   The entire tumor was sent for permanent section in formalin.    Drains: One subgaleal drain    Complications: None    Indications for the procedure: This is a patient with a known meningioma that had doubled in size over the last 2 years.  While it was still quite small she is only 33 years old and we elected to proceed with removal while it was still fairly small.    Operative summary:  The patient was brought into the operating room and placed under general endotracheal anesthesia using intravenous and inhalational agents.  The patient was then positioned on the operating table in the supine position.  All pressure points were padded including peripheral points of entrapment.  Her head was secured in the [place] headrest and turned to the left side.  She was then reference to the Stealth navigation system which was used to plan out the incision in the right frontotemporal region.  This area was shaved free of hair and then injected with 20 cc of 1/8% Marcaine with 1-200,000 epinephrine solution.  She was injected after a prep with ChloraPrep which was allowed to dry for 3 minutes.  She was then draped with towels, half sheets cranial drape and an Ioban.  An incision was then made in the above location.  This was carried down to the temporalis fascia.  The fascia was opened sharply and then the muscle was divided with electrocautery and stripped off the outer table of the skull.  We were able to localize the tumor under the bone and  minimize the craniotomy defect.  A bur hole was placed and then connected around the perimeter using the B1 bit on the DocLanding drill.  Following this the bone flap was elevated and set aside.  The middle meningeal artery was bleeding and was coagulated with bipolar cautery following which the dura was opened sharply.  The remainder of the operation was done under the high-power magnification the operating microscope using microsurgical technique microsurgical instrumentation.  I was able to cut around the tumor itself which easil  came off the cortical surface of the brain although there were a couple of small veins that were attached to the tumor.  These were coagulated and divided.  I then coated the open cortex with FloSeal and brought the systolic blood pressure to 163.  There was no evidence of any further bleeding in the open area of dura was overlaid with DuraGen and then sealed with Adherus.  The bone flap was then put back into place using the cranial fix system and then the muscle, fascia galea and skin layers were closed in succession after placing a drain in the subgaleal space.  Once this was done the incision was dressed and the patient was taken to the recovery room in stable condition.  There were no apparent complications and the sponge, instrument and needle counts were correct at the end of the procedure.

## 2019-10-16 NOTE — ANESTHESIA PROCEDURE NOTES
Airway  Urgency: elective    Date/Time: 10/16/2019 3:40 PM  Airway not difficult    General Information and Staff    Patient location during procedure: OR  Anesthesiologist: Glenn More MD    Indications and Patient Condition  Indications: operative anesthetic; GETA.    Preoxygenated: yes  Mask difficulty assessment: 1 - vent by mask    Final Airway Details  Final airway type: endotracheal airway      Successful airway: ETT  Cuffed: yes   Successful intubation technique: direct laryngoscopy  Endotracheal tube insertion site: oral  Blade: Bridges  Blade size: 2  ETT size (mm): 7.0  Cormack-Lehane Classification: grade I - full view of glottis  Placement verified by: chest auscultation and capnometry   Cuff volume (mL): 6  Measured from: gums  ETT/EBT to gums (cm): 21  Number of attempts at approach: 1

## 2019-10-16 NOTE — BRIEF OP NOTE
CRANIOTOMY FOR TUMOR STEREOTACTIC  Progress Note    Desiree Peterson  10/16/2019    Pre-op Diagnosis:   Meningioma (CMS/HCC) [D32.9]       Post-Op Diagnosis Codes:     * Meningioma (CMS/HCC) [D32.9]    Procedure/CPT® Codes:      Procedure(s):  Right frontotemporal craniotomy for tumor    Surgeon(s):  Nas Coats MD    Anesthesia: General    Staff:   Circulator: Glenny Kelly RN; Spurling, Shannon, RN; Dianna Klein RN  Scrub Person: Susan Li; Lorena Lira  Assistant: Charisse Ortega CSA  Orientee: Melonie Paul    Estimated Blood Loss: minimal    Urine Voided: 350 mL    Specimens:                Specimens     ID Source Type Tests Collected By Collected At Frozen?      A Brain Tissue · TISSUE PATHOLOGY EXAM   Nas Coats MD 10/16/19 9423      Description: right temporal meningioma                Drains:   Closed/Suction Drain Right Other (Comment) Bulb 10 Fr. (Active)       Urethral Catheter Straight-tip;Double-lumen;Silicone 16 Fr. (Active)       Findings: meningioma    Complications: none      Nas Coats MD     Date: 10/16/2019  Time: 5:17 PM

## 2019-10-16 NOTE — PERIOPERATIVE NURSING NOTE
Discharge prescriptions for Norco and Keflex given to pt's mother Karen after copy faxed to retail pharmacy and then to stat HIM line. Copy also placed in chart. Post op instruction sheet from Dr. Coats given to pt's mother as well.

## 2019-10-16 NOTE — ANESTHESIA POSTPROCEDURE EVALUATION
"Patient: Desiree Peterson    Procedure Summary     Date:  10/16/19 Room / Location:  [facility] OR  / [facility] MAIN OR    Anesthesia Start:  1531 Anesthesia Stop:  1759    Procedure:  Right frontotemporal craniotomy for tumor (Right Head) Diagnosis:       Meningioma (CMS/HCC)      (Meningioma (CMS/HCC) [D32.9])    Surgeon:  Nas Coats MD Provider:  Sarah Silver MD    Anesthesia Type:  general ASA Status:  3          Anesthesia Type: general  Last vitals  BP   107/57 (10/16/19 1945)   Temp   37.1 °C (98.8 °F) (10/16/19 1758)   Pulse   58 (10/16/19 1945)   Resp   14 (10/16/19 1945)     SpO2   94 % (10/16/19 1945)     Post Anesthesia Care and Evaluation    Patient location during evaluation: bedside  Patient participation: complete - patient participated  Level of consciousness: awake  Pain management: adequate  Airway patency: patent  Anesthetic complications: No anesthetic complications    Cardiovascular status: acceptable  Respiratory status: acceptable  Hydration status: acceptable    Comments: */57   Pulse 58   Temp 37.1 °C (98.8 °F) (Oral)   Resp 14   Ht 170.2 cm (67\")   Wt 86.3 kg (190 lb 4.1 oz)   LMP 10/12/2019   SpO2 94%   BMI 29.80 kg/m²         "

## 2019-10-16 NOTE — ANESTHESIA PROCEDURE NOTES
Arterial Line      Patient reassessed immediately prior to procedure    Patient location during procedure: holding area  Start time: 10/16/2019 1:44 PM  Stop Time:10/16/2019 1:48 PM       Line placed for hemodynamic monitoring.  Performed By   Anesthesiologist: Benitez Jung MD  Preanesthetic Checklist  Completed: patient identified and risks and benefits discussed  Arterial Line Prep   Sterile Tech: gloves  Prep: ChloraPrep  Patient monitoring: blood pressure monitoring, continuous pulse oximetry and EKG  Arterial Line Procedure   Laterality:left  Location:  radial artery  Catheter size: 20 G   Guidance: ultrasound guided  PROCEDURE NOTE/ULTRASOUND INTERPRETATION.  Using ultrasound guidance the potential vascular sites for insertion of the catheter were visualized to determine the patency of the vessel to be used for vascular access.  After selecting the appropriate site for insertion, the needle was visualized under ultrasound being inserted into the radial artery, followed by ultrasound confirmation of wire and catheter placement. There were no abnormalities seen on ultrasound; an image was taken; and the patient tolerated the procedure with no complications.   Successful placement: yes  Post Assessment   Dressing Type: occlusive dressing applied and secured with tape.   Complications no  Patient Tolerance: patient tolerated the procedure well with no apparent complications  Additional Notes  Using ultrasound guidance the potential vascular sites for insertion of the catheter were visualized to determine the patency of the vessel to be used for vascular access.  After selecting the appropriate site for insertion, the needle was visualized under ultrasound being inserted into the radial artery, followed by ultrasound confirmation of wire and catheter placement.  There were no abnormalities seen on ultrasound; an image was taken/ and the patient tolerated the procedure with no complications.

## 2019-10-17 ENCOUNTER — APPOINTMENT (OUTPATIENT)
Dept: CT IMAGING | Facility: HOSPITAL | Age: 34
End: 2019-10-17

## 2019-10-17 LAB
ANION GAP SERPL CALCULATED.3IONS-SCNC: 10.2 MMOL/L (ref 5–15)
ANION GAP SERPL CALCULATED.3IONS-SCNC: 6.1 MMOL/L (ref 5–15)
APTT PPP: 31.1 SECONDS (ref 22.7–35.4)
BASOPHILS # BLD AUTO: 0.01 10*3/MM3 (ref 0–0.2)
BASOPHILS NFR BLD AUTO: 0.2 % (ref 0–1.5)
BUN BLD-MCNC: 3 MG/DL (ref 6–20)
BUN BLD-MCNC: 3 MG/DL (ref 6–20)
BUN/CREAT SERPL: 5.3 (ref 7–25)
BUN/CREAT SERPL: 7.1 (ref 7–25)
CALCIUM SPEC-SCNC: 8.3 MG/DL (ref 8.6–10.5)
CALCIUM SPEC-SCNC: 8.3 MG/DL (ref 8.6–10.5)
CHLORIDE SERPL-SCNC: 100 MMOL/L (ref 98–107)
CHLORIDE SERPL-SCNC: 101 MMOL/L (ref 98–107)
CO2 SERPL-SCNC: 25.8 MMOL/L (ref 22–29)
CO2 SERPL-SCNC: 28.9 MMOL/L (ref 22–29)
CREAT BLD-MCNC: 0.42 MG/DL (ref 0.57–1)
CREAT BLD-MCNC: 0.57 MG/DL (ref 0.57–1)
DEPRECATED RDW RBC AUTO: 38.4 FL (ref 37–54)
EOSINOPHIL # BLD AUTO: 0 10*3/MM3 (ref 0–0.4)
EOSINOPHIL NFR BLD AUTO: 0 % (ref 0.3–6.2)
ERYTHROCYTE [DISTWIDTH] IN BLOOD BY AUTOMATED COUNT: 12.7 % (ref 12.3–15.4)
GFR SERPL CREATININE-BSD FRML MDRD: 123 ML/MIN/1.73
GFR SERPL CREATININE-BSD FRML MDRD: >150 ML/MIN/1.73
GLUCOSE BLD-MCNC: 120 MG/DL (ref 65–99)
GLUCOSE BLD-MCNC: 159 MG/DL (ref 65–99)
HCT VFR BLD AUTO: 30.1 % (ref 34–46.6)
HGB BLD-MCNC: 9.8 G/DL (ref 12–15.9)
IMM GRANULOCYTES # BLD AUTO: 0.02 10*3/MM3 (ref 0–0.05)
IMM GRANULOCYTES NFR BLD AUTO: 0.4 % (ref 0–0.5)
INR PPP: 1.24 (ref 0.9–1.1)
LYMPHOCYTES # BLD AUTO: 0.71 10*3/MM3 (ref 0.7–3.1)
LYMPHOCYTES NFR BLD AUTO: 15.1 % (ref 19.6–45.3)
MCH RBC QN AUTO: 27.1 PG (ref 26.6–33)
MCHC RBC AUTO-ENTMCNC: 32.6 G/DL (ref 31.5–35.7)
MCV RBC AUTO: 83.1 FL (ref 79–97)
MONOCYTES # BLD AUTO: 0.42 10*3/MM3 (ref 0.1–0.9)
MONOCYTES NFR BLD AUTO: 9 % (ref 5–12)
NEUTROPHILS # BLD AUTO: 3.53 10*3/MM3 (ref 1.7–7)
NEUTROPHILS NFR BLD AUTO: 75.3 % (ref 42.7–76)
NRBC BLD AUTO-RTO: 0 /100 WBC (ref 0–0.2)
PLATELET # BLD AUTO: 188 10*3/MM3 (ref 140–450)
PMV BLD AUTO: 9 FL (ref 6–12)
POTASSIUM BLD-SCNC: 3.8 MMOL/L (ref 3.5–5.2)
POTASSIUM BLD-SCNC: 3.8 MMOL/L (ref 3.5–5.2)
PROTHROMBIN TIME: 15.3 SECONDS (ref 11.7–14.2)
RBC # BLD AUTO: 3.62 10*6/MM3 (ref 3.77–5.28)
SODIUM BLD-SCNC: 135 MMOL/L (ref 136–145)
SODIUM BLD-SCNC: 137 MMOL/L (ref 136–145)
WBC NRBC COR # BLD: 4.69 10*3/MM3 (ref 3.4–10.8)

## 2019-10-17 PROCEDURE — 25010000002 HYDROMORPHONE PER 4 MG: Performed by: NEUROLOGICAL SURGERY

## 2019-10-17 PROCEDURE — 97166 OT EVAL MOD COMPLEX 45 MIN: CPT | Performed by: OCCUPATIONAL THERAPIST

## 2019-10-17 PROCEDURE — 97535 SELF CARE MNGMENT TRAINING: CPT | Performed by: OCCUPATIONAL THERAPIST

## 2019-10-17 PROCEDURE — 70450 CT HEAD/BRAIN W/O DYE: CPT

## 2019-10-17 PROCEDURE — 80048 BASIC METABOLIC PNL TOTAL CA: CPT | Performed by: NEUROLOGICAL SURGERY

## 2019-10-17 PROCEDURE — 99024 POSTOP FOLLOW-UP VISIT: CPT | Performed by: NURSE PRACTITIONER

## 2019-10-17 PROCEDURE — 85730 THROMBOPLASTIN TIME PARTIAL: CPT | Performed by: NEUROLOGICAL SURGERY

## 2019-10-17 PROCEDURE — 25810000003 SODIUM CHLORIDE 0.9 % WITH KCL 20 MEQ 20-0.9 MEQ/L-% SOLUTION: Performed by: NEUROLOGICAL SURGERY

## 2019-10-17 PROCEDURE — 25010000002 VANCOMYCIN PER 500 MG: Performed by: NEUROLOGICAL SURGERY

## 2019-10-17 PROCEDURE — 97162 PT EVAL MOD COMPLEX 30 MIN: CPT

## 2019-10-17 PROCEDURE — 85610 PROTHROMBIN TIME: CPT | Performed by: NEUROLOGICAL SURGERY

## 2019-10-17 PROCEDURE — 85025 COMPLETE CBC W/AUTO DIFF WBC: CPT | Performed by: NEUROLOGICAL SURGERY

## 2019-10-17 PROCEDURE — 97110 THERAPEUTIC EXERCISES: CPT

## 2019-10-17 RX ADMIN — HYDROCODONE BITARTRATE AND ACETAMINOPHEN 1 TABLET: 5; 325 TABLET ORAL at 05:12

## 2019-10-17 RX ADMIN — HYDROMORPHONE HYDROCHLORIDE 0.5 MG: 1 INJECTION, SOLUTION INTRAMUSCULAR; INTRAVENOUS; SUBCUTANEOUS at 12:21

## 2019-10-17 RX ADMIN — HYDROCODONE BITARTRATE AND ACETAMINOPHEN 1 TABLET: 5; 325 TABLET ORAL at 21:48

## 2019-10-17 RX ADMIN — POTASSIUM CHLORIDE AND SODIUM CHLORIDE 100 ML/HR: 900; 150 INJECTION, SOLUTION INTRAVENOUS at 09:13

## 2019-10-17 RX ADMIN — VANCOMYCIN HYDROCHLORIDE 1000 MG: 1 INJECTION, SOLUTION INTRAVENOUS at 03:10

## 2019-10-17 RX ADMIN — HYDROCODONE BITARTRATE AND ACETAMINOPHEN 1 TABLET: 5; 325 TABLET ORAL at 14:57

## 2019-10-17 RX ADMIN — OXCARBAZEPINE 600 MG: 300 TABLET, FILM COATED ORAL at 09:13

## 2019-10-17 RX ADMIN — HYDROMORPHONE HYDROCHLORIDE 0.5 MG: 1 INJECTION, SOLUTION INTRAMUSCULAR; INTRAVENOUS; SUBCUTANEOUS at 15:48

## 2019-10-17 RX ADMIN — HYDROCODONE BITARTRATE AND ACETAMINOPHEN 1 TABLET: 5; 325 TABLET ORAL at 09:13

## 2019-10-17 RX ADMIN — HYDROMORPHONE HYDROCHLORIDE 0.5 MG: 1 INJECTION, SOLUTION INTRAMUSCULAR; INTRAVENOUS; SUBCUTANEOUS at 06:29

## 2019-10-17 RX ADMIN — OXCARBAZEPINE 600 MG: 300 TABLET, FILM COATED ORAL at 20:32

## 2019-10-17 RX ADMIN — VANCOMYCIN HYDROCHLORIDE 1000 MG: 1 INJECTION, SOLUTION INTRAVENOUS at 15:49

## 2019-10-17 RX ADMIN — LEVETIRACETAM 500 MG: 500 TABLET, FILM COATED ORAL at 09:12

## 2019-10-17 RX ADMIN — LEVETIRACETAM 500 MG: 500 TABLET, FILM COATED ORAL at 20:32

## 2019-10-17 RX ADMIN — LEVOTHYROXINE SODIUM 75 MCG: 75 TABLET ORAL at 06:26

## 2019-10-17 RX ADMIN — HYDROMORPHONE HYDROCHLORIDE 0.5 MG: 1 INJECTION, SOLUTION INTRAMUSCULAR; INTRAVENOUS; SUBCUTANEOUS at 20:32

## 2019-10-17 RX ADMIN — HYDROMORPHONE HYDROCHLORIDE 0.5 MG: 1 INJECTION, SOLUTION INTRAMUSCULAR; INTRAVENOUS; SUBCUTANEOUS at 02:14

## 2019-10-17 RX ADMIN — VENLAFAXINE HYDROCHLORIDE 37.5 MG: 37.5 CAPSULE, EXTENDED RELEASE ORAL at 20:32

## 2019-10-17 RX ADMIN — HYDROMORPHONE HYDROCHLORIDE 0.5 MG: 1 INJECTION, SOLUTION INTRAMUSCULAR; INTRAVENOUS; SUBCUTANEOUS at 04:16

## 2019-10-17 NOTE — PROGRESS NOTES
"      [facility]         Dr Garza Sayied   LOS: 1 day   Patient Care Team:  Guy Kelly MD as PCP - General (Family Medicine)    Chief Complaint: Status post craniotomy for right temporal meningioma recent intractable seizures migraine headaches smoker    Interval History: Postop headache.  No nausea vomiting as such reported.  Overall doing well.    REVIEW OF SYSTEMS:   CARDIOVASCULAR: No chest pain, chest pressure or chest discomfort. No palpitations or edema.   RESPIRATORY: No shortness of breath, cough or sputum.   GASTROINTESTINAL: No anorexia, nausea, vomiting or diarrhea. No abdominal pain or blood.   HEMATOLOGIC: No bleeding or bruising.     Ventilator/Non-Invasive Ventilation Settings (From admission, onward)    None            Vital Signs  Temp:  [98 °F (36.7 °C)-98.8 °F (37.1 °C)] 98 °F (36.7 °C)  Heart Rate:  [43-93] 65  Resp:  [12-20] 14  BP: ()/(53-85) 96/56  Arterial Line BP: ()/(33-65) 116/60    Intake/Output Summary (Last 24 hours) at 10/17/2019 0954  Last data filed at 10/17/2019 0500  Gross per 24 hour   Intake 2392 ml   Output 3410 ml   Net -1018 ml     Flowsheet Rows      First Filed Value   Admission Height  170.2 cm (67\") Documented at 10/16/2019 1153   Admission Weight  86.3 kg (190 lb 4.1 oz) Documented at 10/16/2019 1153          Physical Exam:   General Appearance:    Alert, cooperative, in no acute distress  Head surgical dressing from right frontal craniotomy frontotemporal.  Pupils reactive to light.   Lungs:     Clear to auscultation,respirations regular, even and                  unlabored    Heart:    Regular rhythm and normal rate, normal S1 and S2, no            murmur, no gallop, no rub, no click   Chest Wall:    No abnormalities observed   Abdomen:     Normal bowel sounds, no masses, no organomegaly, soft        non-tender, non-distended, no guarding, no rebound                tenderness   Extremities:   Moves all extremities well, no edema, no " cyanosis, no             redness     Results Review:        Results from last 7 days   Lab Units 10/17/19  0523 10/16/19  2140 10/15/19  0038   SODIUM mmol/L 135* 137 136   POTASSIUM mmol/L 3.8 3.8 3.4*   CHLORIDE mmol/L 100 101 97*   CO2 mmol/L 28.9 25.8 29.1*   BUN mg/dL 3* 3* 7   CREATININE mg/dL 0.42* 0.57 0.68   GLUCOSE mg/dL 120* 159* 94   CALCIUM mg/dL 8.3* 8.3* 8.7         Results from last 7 days   Lab Units 10/17/19  0523 10/16/19  2140 10/15/19  0038   WBC 10*3/mm3 4.69 6.04 5.32   HEMOGLOBIN g/dL 9.8* 10.7* 12.3   HEMATOCRIT % 30.1* 32.6* 37.9   PLATELETS 10*3/mm3 188 199 226     Results from last 7 days   Lab Units 10/17/19  0523   INR  1.24*   APTT seconds 31.1                       I reviewed the patient's new clinical results.  I personally viewed and interpreted the patient's CXR        Medication Review:     levETIRAcetam 500 mg Oral BID   levothyroxine 75 mcg Oral Q AM   OXcarbazepine 600 mg Oral Q12H   vancomycin 1,000 mg Intravenous Q12H   venlafaxine XR 37.5 mg Oral Nightly         niCARdipine 5-15 mg/hr    sodium chloride 0.9 % with KCl 20 mEq 100 mL/hr Last Rate: 100 mL/hr (10/17/19 0913)       ASSESSMENT:   PCCM Problems  Craniotomy for right temporal meningioma  Recent intractable seizures  Migraine  POS  Recent smoker    PLAN:  Neurochecks per protocol  Keppra for seizure management  Plans to quit smoking long-term  ICU core measures  Per neurosurgery  Transfer out of the ICU once cleared by neurosurgery    Kayla Rondon MD  10/17/19  9:54 AM

## 2019-10-17 NOTE — PLAN OF CARE
Problem: Patient Care Overview  Goal: Plan of Care Review   10/17/19 1150   Coping/Psychosocial   Plan of Care Reviewed With patient;friend   OTHER   Outcome Summary Pt is 34 yo female admitted s/p frontotemporal craniotomy 10/16/19 to remove meningioma. No pertinent medical hisotry is listed but she reports having had seizure disorder prior to hospitalization leading her to being mostly bedbound for approx 3 months prior to now. She presents with general balance deficits in standing and with ambulation and will benefit from skilled therapy services to address these balance imapirements as well as increasing her standing tolerance and endurance for walking and stair manipulation. She hopes to improve upon her PLOF by becoming more independent with ambulation and being able to complete ADLs independently. She has no focal strength deficits and shows great potential to improve with therapy. Anticipate d/c to home with assist.

## 2019-10-17 NOTE — CONSULTS
[facility]  Phone: [phone]  Luisito Linda MD      Subjective   LOS: 1 day     Thank you for this consultation.  33-year-old female admitted to the intensive care unit following craniotomy.  She has a slowly growing meningioma that is now impinging further on the brain.  She was therefore taken to the OR for craniotomy which was successfully completed.  Patient states she has been feeling very unwell for the last 3 months and is been basically bedbound.  She has been having seizures.  We recently informed by her neurologist that she was having seizures continuously including through the night.  She was recently started on Keppra.  Has been on Trileptal for a while.  She also has pots disease as well as migraine headaches.  She has been a smoker 1 pack of cigarettes a day but has quit recently and no plans to resume.  Sounds like about a 10-pack-year smoking history.  Denies alcohol abuse.  Denies other health problems.    Desiree Peterson  reports that she does not drink alcohol.,  reports that she quit smoking 2 days ago. Her smoking use included cigarettes. She started smoking about 3 years ago. She has a 3.00 pack-year smoking history. She has never used smokeless tobacco.     Past Hx:  has a past medical history of Abnormal menses, Anxiety and depression, Atrial flutter (CMS/HCC), Bartholin cyst, Brain tumor (CMS/HCC), Cervical pain (neck), Cyst, sinus nasal, Dumping syndrome, Heart murmur, History of anemia, Meningioma (CMS/HCC), Mid back pain, Migraines, POTS (postural orthostatic tachycardia syndrome), Seizures (CMS/HCC), SOB (shortness of breath), Syncope and collapse, Thyroid nodule, and Tinnitus of both ears.  Surg Hx:  has a past surgical history that includes Finger surgery.  FH: family history includes No Known Problems in her father and mother.  SH:  reports that she quit smoking 2 days ago. Her smoking use included cigarettes. She started smoking about 3 years ago. She has a  3.00 pack-year smoking history. She has never used smokeless tobacco. She reports that she does not drink alcohol or use drugs.    Medications Prior to Admission   Medication Sig Dispense Refill Last Dose   • clonazePAM (KlonoPIN) 1 MG tablet Take 1 tablet by mouth 3 (Three) Times a Day As Needed for Anxiety or Seizures. 60 tablet 1 10/15/2019 at 2100   • levETIRAcetam (KEPPRA) 500 MG tablet Take 1 tablet by mouth 2 (Two) Times a Day. 60 tablet 0 10/16/2019 at 0900   • levothyroxine (SYNTHROID, LEVOTHROID) 75 MCG tablet Take 1 tablet by mouth Daily. 30 tablet 3 10/15/2019 at 0800   • OXcarbazepine (TRILEPTAL) 300 MG tablet Take 600 mg by mouth 2 (Two) Times a Day.   10/16/2019 at 0900   • venlafaxine XR (EFFEXOR-XR) 37.5 MG 24 hr capsule Take 37.5 mg by mouth Every Night.   10/15/2019 at 2100   • amphetamine-dextroamphetamine (ADDERALL) 10 MG tablet Take 30 mg by mouth 2 (Two) Times a Day.   10/7/2019   • Aspirin-Acetaminophen-Caffeine (EXCEDRIN MIGRAINE PO) Take 1 tablet by mouth As Needed (headache).   10/9/2019   • ibuprofen (ADVIL,MOTRIN) 200 MG tablet Take 400 mg by mouth Every 6 (Six) Hours As Needed for Mild Pain .   10/10/2019   • ondansetron (ZOFRAN) 4 MG tablet Take 1 tablet by mouth Every 8 (Eight) Hours As Needed for Nausea or Vomiting. 30 tablet 1 More than a month at Unknown time   • ondansetron ODT (ZOFRAN-ODT) 4 MG disintegrating tablet Take 1 tablet by mouth Every 8 (Eight) Hours As Needed for Nausea or Vomiting. 30 tablet 1 More than a month at Unknown time   • TiZANidine (ZANAFLEX) 2 MG capsule Take 2 mg by mouth 3 (Three) Times a Day As Needed for Muscle Spasms.   More than a month at Unknown time     Allergies   Allergen Reactions   • Cefdinir Rash   • Penicillins Rash       Review of Systems   Constitutional: Negative for chills and fever.   HENT: Negative for congestion and sore throat.    Respiratory: Negative for cough and shortness of breath.    Gastrointestinal: Negative for abdominal  pain, nausea and vomiting.   Genitourinary: Negative for dysuria and hematuria.   Musculoskeletal: Negative for arthralgias and back pain.   Skin: Negative for pallor and rash.   Psychiatric/Behavioral: Negative for agitation and confusion.     Vital Signs past 24hrs  BP range: BP: (101-128)/(55-85) 101/55  Pulse range: Heart Rate:  [55-93] 68  Resp rate range: Resp:  [14-20] 16  Temp range: Temp (24hrs), Av.5 °F (36.9 °C), Min:98.1 °F (36.7 °C), Max:98.8 °F (37.1 °C)    Oxygen range: SpO2:  [93 %-100 %] 97 %; Flow (L/min):  [2-4] 2;   Device (Oxygen Therapy): nasal cannula  86.3 kg (190 lb 4.1 oz); Body mass index is 29.8 kg/m².  I/O this shift:  In: 360 [P.O.:210; I.V.:150]  Out: 1020 [Urine:1000; Drains:20]    Adult female no acute distress.  She has surgical dressing from craniotomy right frontal temporal.  Pupils equal and react to light.  Oropharynx class II Mallampati airway no posterior pharyngeal discharge.  Nasopharynx without discharge septum midline JVP not elevated trachea midline thyroid not enlarged lungs reveal bilateral air entry clear to auscultation anteriorly and posteriorly percussion not resonant chest expansion equal heart examination S1-S2 present rhythm regular no murmurs no edema lower extremities abdomen is soft nontender bowel sounds present no liver spleen enlargement no peripheral cyanosis clubbing no cervical, axillary, inguinal adenopathy  Patient moves all 4 extremities sensorimotor appears intact no obvious cranial nerve deficit was noted by me    Results Review:    I have reviewed the laboratory and imaging data from current admission. My annotations are as noted in assessment and plan.    Medication Review:  I have reviewed the current MAR. My annotations are as noted in assessment and plan.    Plan   PCCM Problems  Craniotomy for right temporal meningioma  Recent intractable seizures  Migraine  POS  Recent smoker        Plan of Treatment  Status post craniotomy.  Patient will  be watched in the ICU for neuro checks.  Currently seems to be doing well.  She is back on her medications for management of seizures.    She plans to quit smoking.  Encouraged to abstain.    Part of this note may be an electronic transcription/translation of spoken language to printed text using the Dragon Dictation System.

## 2019-10-17 NOTE — PROGRESS NOTES
Clinical Pharmacy Services: Medication History    Desiree Peterson is a 33 y.o. female presenting to [facility] for Meningioma (CMS/HCC) [D32.9]  Meningioma (CMS/HCC) [D32.9]    She  has a past medical history of Abnormal menses, Anxiety and depression, Atrial flutter (CMS/HCC), Bartholin cyst, Brain tumor (CMS/HCC), Cervical pain (neck), Cyst, sinus nasal, Dumping syndrome, Heart murmur, History of anemia, Meningioma (CMS/HCC), Mid back pain, Migraines, POTS (postural orthostatic tachycardia syndrome), Seizures (CMS/HCC), SOB (shortness of breath), Syncope and collapse, Thyroid nodule, and Tinnitus of both ears.    Allergies as of 09/17/2019 - Reviewed 09/17/2019   Allergen Reaction Noted   • Cefdinir  02/17/2013   • Penicillins  07/01/2013       Medication information was obtained from: patient and multiple pharmacies  Pharmacy and Phone Number:   [facility] pharmacy - [address] In [facility] - [phone]  [facility] - [address] - [phone]  [facility] - [phone]      Prior to Admission Medications     Prescriptions Last Dose Informant Patient Reported? Taking?    Aspirin-Acetaminophen-Caffeine (EXCEDRIN MIGRAINE PO)   Yes Yes    Take 1 tablet by mouth As Needed (headache).    clonazePAM (KlonoPIN) 1 MG tablet 10/15/2019  No Yes    Take 1 tablet by mouth 3 (Three) Times a Day As Needed for Anxiety or Seizures.    HYDROcodone-acetaminophen (NORCO) 5-325 MG per tablet   No Yes    Take 1 tablet by mouth every 4 (Four) hours as needed for pain.    **levETIRAcetam (KEPPRA) 500 MG tablet** 10/16/2019  No Yes    **Take 1 tablet by mouth 2 (Two) Times a Day.** see medication notes**    levothyroxine (SYNTHROID, LEVOTHROID) 75 MCG tablet 10/15/2019  No Yes    Take 1 tablet by mouth Daily.    ondansetron (ZOFRAN) 4 MG tablet   No Yes    Take 1 tablet by mouth Every 8 (Eight) Hours As Needed for Nausea or Vomiting.    OXcarbazepine (TRILEPTAL) 300 MG tablet  10/16/2019  Yes Yes    Take 600 mg by mouth 2 (Two) Times a Day.    TiZANidine (ZANAFLEX) 2 MG capsule   Yes Yes    Take 2 mg by mouth 3 (Three) Times a Day As Needed for Muscle Spasms.    venlafaxine XR (EFFEXOR-XR) 37.5 MG 24 hr capsule 10/15/2019  Yes Yes    Take 37.5 mg by mouth Every Night.    amphetamine-dextroamphetamine (ADDERALL) 10 MG tablet 10/7/2019  Yes No    Take 30 mg by mouth 2 (Two) Times a Day.    cephalexin (KEFLEX) 500 MG capsule   No No    Take 1 capsule by mouth every 6 (Six) hours.    ibuprofen (ADVIL,MOTRIN) 200 MG tablet 10/10/2019  Yes No    Take 400 mg by mouth Every 6 (Six) Hours As Needed for Mild Pain .    ondansetron ODT (ZOFRAN-ODT) 4 MG disintegrating tablet More than a month  No No    Take 1 tablet by mouth Every 8 (Eight) Hours As Needed for Nausea or Vomiting.            Medication notes: Patient has cephalexin ready for pick-up in the outpatient pharmacy. Let them know that the patient explained they developed a rash as a result of treatment with cefdinir in the past. Patient is no longer taking Adderall. **Patient also has a written prescription for levetiracetam 500 mg q6h that was to be filled prior to admission**    This medication list is complete to the best of my knowledge as of 10/17/2019    Please call if questions.    Flaco Sands, PharmD Candidate  10/17/2019 1:42 PM

## 2019-10-17 NOTE — PLAN OF CARE
Problem: Pain, Chronic (Adult)  Intervention: Manage Persistent Pain Analgesia   10/17/19 0401   Promote Effective Bowel Elimination   Bowel Intervention adequate fluid intake promoted   Safety Management   Medication Review/Management medications reviewed     Intervention: Support Psychosocial Response to Persistent Pain   10/17/19 0401   Coping/Psychosocial Interventions   Supportive Measures active listening utilized;counseling provided;relaxation techniques promoted;verbalization of feelings encouraged   Psychosocial Support   Diversional Activities smartphone     Intervention: Mutually Develop/Implement Persistent Pain Management Plan   10/17/19 0401   Promote Oxygenation/Perfusion   Pain Management Interventions quiet environment facilitated;diversional activity provided;pain management plan reviewed with patient/caregiver;position adjusted;unnecessary movement minimized;relaxation techniques promoted   Cognitive Interventions   Sensory Stimulation Regulation lighting decreased;quiet environment promoted       Goal: Identify Related Risk Factors and Signs and Symptoms  Outcome: Ongoing (interventions implemented as appropriate)   10/17/19 0401   Pain, Chronic (Adult)   Related Risk Factors (Chronic Pain) disease process;procedures/treatments   Signs and Symptoms (Chronic Pain) verbalization of pain descriptors     Goal: Acceptable Pain/Comfort Level and Functional Ability  Outcome: Ongoing (interventions implemented as appropriate)      Problem: Craniotomy/Craniectomy/Cranioplasty (Adult)  Intervention: Protect/Optimize Cerebral Perfusion   10/17/19 0401   Neurological Interventions   Cerebral Edema Prevention/Management blood pressure monitored;normothermia promoted     Intervention: Prevent/Manage Post-surgical Infection   10/17/19 0401   Safety Management   Infection Prevention environmental surveillance performed;personal protective equipment utilized;rest/sleep promoted     Intervention: Monitor/Manage  Seizure Activity   10/17/19 0401   Safety Interventions   Seizure Precautions clutter-free environment maintained;emergency equipment at bedside;side rails padded     Intervention: Prevent/Manage DVT/VTE Risk   10/17/19 0401   Interventions   VTE Prevention/Management bilateral;sequential compression devices on     Intervention: Optimize Psychosocial Response to the Surgical Experience   10/17/19 0401   Interventions   Trust Relationship/Rapport care explained;emotional support provided;thoughts/feelings acknowledged;reassurance provided;questions answered;empathic listening provided       Goal: Signs and Symptoms of Listed Potential Problems Will be Absent, Minimized or Managed (Craniotomy/Craniectomy/Cranioplasty)  Outcome: Ongoing (interventions implemented as appropriate)   10/17/19 0401   Goal/Outcome Evaluation   Problems Assessed (Craniotomy/Craniectomy/Cranioplasty) pain   Problems Present (Craniotomy/ectomy) pain

## 2019-10-17 NOTE — PLAN OF CARE
Problem: Patient Care Overview  Goal: Plan of Care Review   10/17/19 2442   Coping/Psychosocial   Plan of Care Reviewed With patient   OTHER   Outcome Summary Pt seen for OT eval this am following crani for meningioma. Pt was indep PTA but stated that she was falling often and having difficulty performing self care skills. This date pt performed entire ADL and did require CGA for balance and lost balance several times standing at sink. Pt does have some gen weakness and decreased endurance post op and could benefit from OT to address deficits. Goals set at mod I level, aticipate home at d/c.

## 2019-10-17 NOTE — PROGRESS NOTES
Discharge Planning Assessment  [facility]     Patient Name: Desiree Peterson  MRN: [id number]  Today's Date: 10/17/2019    Admit Date: 10/16/2019    Discharge Needs Assessment     Row Name 10/17/19 1555       Living Environment    Lives With  child(maki), adult    Current Living Arrangements  home/apartment/condo    Primary Care Provided by  self;parent(s);other (see comments)    Provides Primary Care For  child(maki)    Family Caregiver if Needed  parent(s);other relative(s)    Quality of Family Relationships  supportive    Able to Return to Prior Arrangements  yes       Resource/Environmental Concerns    Resource/Environmental Concerns  none       Transition Planning    Patient/Family Anticipates Transition to  home with family    Patient/Family Anticipated Services at Transition  none    Transportation Anticipated  family or friend will provide       Discharge Needs Assessment    Concerns to be Addressed  discharge planning    Equipment Currently Used at Home  none    Anticipated Changes Related to Illness  none    Equipment Needed After Discharge  none    Outpatient/Agency/Support Group Needs  homecare agency        Discharge Plan     Row Name 10/17/19 5457       Plan    Plan  Home  would like follow up if HH is needed    Plan Comments  CCP spoke to patient at bedside to discuss discharge planning.  CCP role explained.  Face sheet verified.  Pt emergency contact is her mother/LIMA Jones, [phone].   Pt lives in a house  with her dependent daughter   Her Aunt and mother are staying with her while she recuperates.  .    She is independent with ADL's. She uses no DME  to ambulate.  She has no history of Home Health.   She has not been to rehab.  Pt obtains her medications from [facility]  pharmacy on [address].      Plan is Home She would like follow up for Home Health at Discharge   CCP following        Destination      No service coordination in this encounter.      Durable Medical Equipment      No  service coordination in this encounter.      Dialysis/Infusion      No service coordination in this encounter.      Home Medical Care      No service coordination in this encounter.      Therapy      No service coordination in this encounter.      Community Resources      No service coordination in this encounter.          Demographic Summary    No documentation.       Functional Status    No documentation.       Psychosocial    No documentation.       Abuse/Neglect    No documentation.       Legal    No documentation.       Substance Abuse    No documentation.       Patient Forms    No documentation.           May Davila RN

## 2019-10-17 NOTE — THERAPY EVALUATION
Patient Name: Desiree Peterson  : 1985    MRN: [id number]                              Today's Date: 10/17/2019       Admit Date: 10/16/2019    Visit Dx:     ICD-10-CM ICD-9-CM   1. Meningioma (CMS/HCC) D32.9 225.2     Patient Active Problem List   Diagnosis   • Meningioma (CMS/HCC)   • Paresthesia   • Atypical migraine   • Hypokalemia   • Migraine aura without headache   • Cervical radiculopathy   • Cerebral meningioma (CMS/HCC)     Past Medical History:   Diagnosis Date   • Abnormal menses    • Anxiety and depression    • Atrial flutter (CMS/HCC)    • Bartholin cyst     X2   • Brain tumor (CMS/HCC)    • Cervical pain (neck)    • Cyst, sinus nasal    • Dumping syndrome    • Heart murmur    • History of anemia    • Meningioma (CMS/HCC)    • Mid back pain    • Migraines    • POTS (postural orthostatic tachycardia syndrome)    • Seizures (CMS/HCC)    • SOB (shortness of breath)    • Syncope and collapse    • Thyroid nodule    • Tinnitus of both ears      Past Surgical History:   Procedure Laterality Date   • FINGER SURGERY      Cyst removal     General Information     Row Name 10/17/19 1142          PT Evaluation Time/Intention    Document Type  evaluation  (Pended)   -AP     Mode of Treatment  physical therapy  (Pended)   -AP     Row Name 10/17/19 1142          General Information    Patient Profile Reviewed?  yes  (Pended)   -AP     Prior Level of Function  independent:  (Pended)  was independent with movement but has not moved very much in approx 3 months due to not feeling well  -AP     Existing Precautions/Restrictions  fall  (Pended)   -AP     Barriers to Rehab  none identified  (Pended)   -AP     Row Name 10/17/19 1142          Relationship/Environment    Lives With  child(maki), dependent  (Pended)   -AP     Row Name 10/17/19 1142          Resource/Environmental Concerns    Current Living Arrangements  home/apartment/condo  (Pended)   -AP     Row Name 10/17/19 1142          Home Main Entrance     Number of Stairs, Main Entrance  five  (Pended)   -     Row Name 10/17/19 1142          Stairs Within Home, Primary    Stairs, Within Home, Primary  one flight of steps to basement  (Pended)   -     Row Name 10/17/19 1142          Cognitive Assessment/Intervention- PT/OT    Orientation Status (Cognition)  oriented x 4  (Pended)   -     Cognitive Assessment/Intervention Comment  very lethargic, needed verbal cues once to keep eyes open  (Pended)   -AP       User Key  (r) = Recorded By, (t) = Taken By, (c) = Cosigned By    Initials Name Provider Type    AP Melissa Mortensen, PT Student PT Student        Mobility     Row Name 10/17/19 1144          Bed Mobility Assessment/Treatment    Bed Mobility Assessment/Treatment  bed mobility (all) activities  (Pended)   -     [place] Level (Bed Mobility)  supervision  (Pended)   -     Row Name 10/17/19 1144          Sit-Stand Transfer    Sit-Stand [place] (Transfers)  contact guard;1 person to manage equipment  (Pended)   -     Row Name 10/17/19 1144          Gait/Stairs Assessment/Training    Gait/Stairs Assessment/Training  gait/ambulation independence  (Pended)   -     [place] Level (Gait)  contact guard;1 person to manage equipment  (Pended)   -AP     Distance in Feet (Gait)  5 ft to sink and back  (Pended)   -AP     Deviations/Abnormal Patterns (Gait)  stride length decreased;base of support, wide  (Pended)   -AP     Bilateral Gait Deviations  heel strike decreased  (Pended)   -AP     Comment (Gait/Stairs)  pt seems unsteady on her feet and has some lateral sway with static standing and walking  (Pended)   -AP       User Key  (r) = Recorded By, (t) = Taken By, (c) = Cosigned By    Initials Name Provider Type    AP Melissa Mortensen, PT Student PT Student        Obj/Interventions     Row Name 10/17/19 1145          General ROM    GENERAL ROM COMMENTS  WFL BLE/UE  (Pended)   -     Row Name 10/17/19 1145          MMT (Manual Muscle Testing)    General  MMT Comments  Grossly 5/5 B  (Pended)   -AP     Row Name 10/17/19 1145          Therapeutic Exercise    Lower Extremity (Therapeutic Exercise)  gluteal sets;LAQ (long arc quad), bilateral;marching while seated  (Pended)   -AP     Lower Extremity Range of Motion (Therapeutic Exercise)  ankle dorsiflexion/plantar flexion, bilateral  (Pended)   -AP     Exercise Type (Therapeutic Exercise)  AROM (active range of motion)  (Pended)   -AP     Position (Therapeutic Exercise)  seated  (Pended)   -AP     Sets/Reps (Therapeutic Exercise)  1/10  (Pended)   -AP     Row Name 10/17/19 1145          Static Sitting Balance    Level of [place] (Unsupported Sitting, Static Balance)  supervision  (Pended)   -AP     Sitting Position (Unsupported Sitting, Static Balance)  sitting on edge of bed  (Pended)   -AP     Row Name 10/17/19 1145          Dynamic Sitting Balance    Level of [place], Reaches Outside Midline (Sitting, Dynamic Balance)  supervision  (Pended)   -AP     Sitting Position, Reaches Outside Midline (Sitting, Dynamic Balance)  sitting on edge of bed  (Pended)   -AP     Row Name 10/17/19 1145          Static Standing Balance    Level of [place] (Supported Standing, Static Balance)  contact guard assist  (Pended)   -AP     Time Able to Maintain Position (Supported Standing, Static Balance)  1 to 2 minutes  (Pended)   -AP     Comment (Supported Standing, Static Balance)  report of dizziness, not severe  (Pended)   -AP     Row Name 10/17/19 1145          Dynamic Standing Balance    Level of [place], Reaches Outside Midline (Standing, Dynamic Balance)  minimal assist, 75% patient effort;1 person to manage equipment  (Pended)   -AP     Time Able to Maintain Position, Reaches Outside Midline (Standing, Dynamic Balance)  1 to 2 minutes  (Pended)   -AP     Comment, Reaches Outside Midline (Standing, Dynamic Balance)  reports dizziness when standing for any period, never went away but did not worsen and become  severe  (Pended)   -AP     Row Name 10/17/19 1145          Sensory Assessment/Intervention    Sensory General Assessment  no sensation deficits identified  (Pended)   -AP       User Key  (r) = Recorded By, (t) = Taken By, (c) = Cosigned By    Initials Name Provider Type    AP Melissa Mortensen, PT Student PT Student        Goals/Plan     Row Name 10/17/19 1148          Gait Training Goal 1 (PT)    Activity/Assistive Device (Gait Training Goal 1, PT)  gait (walking locomotion);improve balance and speed  (Pended)   -AP     [place] Level (Gait Training Goal 1, PT)  supervision required  (Pended)   -AP     Distance (Gait Goal 1, PT)  150  (Pended)   -AP     Time Frame (Gait Training Goal 1, PT)  1 week  (Pended)   -AP     Row Name 10/17/19 1148          Stairs Goal 1 (PT)    Activity/Assistive Device (Stairs Goal 1, PT)  stairs, all skills  (Pended)   -AP     [place] Level/Cues Needed (Stairs Goal 1, PT)  contact guard assist  (Pended)   -AP     Number of Stairs (Stairs Goal 1, PT)  12 (1 full flight)  (Pended)   -AP     Time Frame (Stairs Goal 1, PT)  1 week  (Pended)   -AP       User Key  (r) = Recorded By, (t) = Taken By, (c) = Cosigned By    Initials Name Provider Type    AP Melissa Mortensen, PT Student PT Student        Clinical Impression     Row Name 10/17/19 1147          Pain Assessment    Additional Documentation  Pain Scale: Numbers Pre/Post-Treatment (Group)  (Pended)   -AP     Row Name 10/17/19 1147          Pain Scale: Numbers Pre/Post-Treatment    Pain Scale: Numbers, Pretreatment  3/10  (Pended)   -AP     Pre/Post Treatment Pain Comment  does not report numeric grade at the time, says it is manageable  (Pended)   -AP     Pain Intervention(s)  Medication (See MAR);Repositioned;Ambulation/increased activity  (Pended)   -AP     Row Name 10/17/19 1147          Plan of Care Review    Plan of Care Reviewed With  patient;friend  (Pended)   -     Row Name 10/17/19 1147          Physical Therapy Clinical  Impression    Patient/Family Goals Statement (PT Clinical Impression)  Return home with ability to walk and complete ADLs independently  (Pended)   -AP     Criteria for Skilled Interventions Met (PT Clinical Impression)  yes;treatment indicated  (Pended)   -AP     Rehab Potential (PT Clinical Summary)  good, to achieve stated therapy goals  (Pended)   -AP     Row Name 10/17/19 1147          Vital Signs    Pre Systolic BP Rehab  101  (Pended)   -AP     Pre Treatment Diastolic BP  64  (Pended)   -AP     Pretreatment Heart Rate (beats/min)  58  (Pended)   -AP     Pre SpO2 (%)  100  (Pended)   -AP     O2 Delivery Pre Treatment  room air  (Pended)   -AP     Row Name 10/17/19 1147          Positioning and Restraints    Pre-Treatment Position  in bed  (Pended)   -AP     Post Treatment Position  bed  (Pended)   -AP     In Bed  supine;with family/caregiver;call light within reach;encouraged to call for assist;exit alarm on  (Pended)   -AP       User Key  (r) = Recorded By, (t) = Taken By, (c) = Cosigned By    Initials Name Provider Type    Melissa Dickens, PT Student PT Student        Outcome Measures     Row Name 10/17/19 1356          How much help from another person do you currently need...    Turning from your back to your side while in flat bed without using bedrails?  4  (Pended)   -AP     Moving from lying on back to sitting on the side of a flat bed without bedrails?  4  (Pended)   -AP     Moving to and from a bed to a chair (including a wheelchair)?  3  (Pended)   -AP     Standing up from a chair using your arms (e.g., wheelchair, bedside chair)?  3  (Pended)   -AP     Climbing 3-5 steps with a railing?  2  (Pended)   -AP     To walk in hospital room?  3  (Pended)   -AP     AM-PAC 6 Clicks Score (PT)  19  (Pended)   -AP     Row Name 10/17/19 1150          Modified [place] Scale    Modified [place] Scale  4 - Moderately severe disability.  Unable to walk without assistance, and unable to attend to own bodily needs  without assistance.  (Pended)   -     Row Name 10/17/19 1155          Functional Assessment    Outcome Measure Options  AM-PAC 6 Clicks Basic Mobility (PT);Modified [place]  (Pended)   -       User Key  (r) = Recorded By, (t) = Taken By, (c) = Cosigned By    Initials Name Provider Type     Melissa Mortensen PT Student PT Student        Physical Therapy Education     Title: PT OT SLP Therapies (In Progress)     Topic: Physical Therapy (In Progress)     Point: Mobility training (Done)     Learning Progress Summary           Patient Acceptance, E, VU by  at 10/17/2019 11:50 AM                   Point: Body mechanics (Done)     Learning Progress Summary           Patient Acceptance, E, VU by  at 10/17/2019 11:50 AM                   Point: Precautions (Done)     Learning Progress Summary           Patient Acceptance, E, VU by  at 10/17/2019 11:50 AM                               User Key     Initials Effective Dates Name Provider Type Discipline     09/04/19 -  Melissa Mortensen PT Student PT Student PT              PT Recommendation and Plan  Planned Therapy Interventions (PT Eval): (P) balance training, gait training, patient/family education, strengthening, stair training, transfer training  Outcome Summary/Treatment Plan (PT)  Anticipated Discharge Disposition (PT): (P) home with assist  Plan of Care Reviewed With: (P) patient, friend  Outcome Summary: (P) Pt is 34 yo female admitted s/p frontotemporal craniotomy 10/16/19 to remove meningioma. No pertinent medical hisotry is listed but she reports having had seizure disorder prior to hospitalization leading her to being mostly bedbound for approx 3 months prior to now. She presents with general balance deficits in standing and with ambulation and will benefit from skilled therapy services to address these balance imapirements as well as increasing her standing tolerance and endurance for walking and stair manipulation. Anticipate d/c to home with assist.      Time Calculation:   PT Charges     Row Name 10/17/19 1156             Time Calculation    Start Time  1127  (Pended)   -AP      Stop Time  1151  (Pended)   -AP      Time Calculation (min)  24 min  (Pended)   -AP      PT Received On  10/17/19  (Pended)   -AP      PT - Next Appointment  10/18/19  (Pended)   -AP      PT Goal Re-Cert Due Date  10/24/19  (Pended)   -AP         Time Calculation- PT    Total Timed Code Minutes- PT  14 minute(s)  (Pended)   -AP        User Key  (r) = Recorded By, (t) = Taken By, (c) = Cosigned By    Initials Name Provider Type    AP Melissa Mortensen, PT Student PT Student        Therapy Charges for Today     Code Description Service Date Service Provider Modifiers Qty    23217516426 HC PT EVAL MOD COMPLEXITY 2 10/17/2019 Melissa Mortensen, PT Student GP 1    56782792832 HC PT THER PROC EA 15 MIN 10/17/2019 Melissa Mortensen, PT Student GP 1    66677077939 HC PT THER SUPP EA 15 MIN 10/17/2019 Melissa Mortensen, PT Student GP 1          PT G-Codes  Outcome Measure Options: (P) AM-PAC 6 Clicks Basic Mobility (PT), Modified [place]  AM-PAC 6 Clicks Score (PT): (P) 19  Modified [place] Scale: (P) 4 - Moderately severe disability.  Unable to walk without assistance, and unable to attend to own bodily needs without assistance.    Melissa Mortensen PT Student  10/17/2019

## 2019-10-17 NOTE — PROGRESS NOTES
POSTOP   LOS: 1 day   Patient Care Team:  Guy Kelly MD as PCP - General (Family Medicine)    Chief Complaint:  No complaints, postop craniotomy    Subjective   Mild headache    Objective     A & O x 3  Motor 5/5 no drift  Incision well approximated  Sensation intact  EOEM intact  PERRLA  Lunge effort normal  Speech clear  Drain with serosanguineous drainage      Vital Signs  Temp:  [98 °F (36.7 °C)-98.8 °F (37.1 °C)] 98 °F (36.7 °C)  Heart Rate:  [43-93] 47  Resp:  [12-20] 14  BP: ()/(53-85) 93/56  Arterial Line BP: ()/(33-65) 101/59       Results Review:     I reviewed the patient's new clinical results.  .  Results from last 7 days   Lab Units 10/17/19  0523   WBC 10*3/mm3 4.69   HEMOGLOBIN g/dL 9.8*   HEMATOCRIT % 30.1*   PLATELETS 10*3/mm3 188         Assessment/Plan       Meningioma (CMS/HCC)      POD #1 Craniotomy for meningioma. She is doing very well from the surgical standpoint. OK to move out of ICU. Will get postop MRI. D/C drain and F/C. Ambulate. CBC am.       Liz Leahy, MYAH  10/17/19  8:47 AM

## 2019-10-18 ENCOUNTER — APPOINTMENT (OUTPATIENT)
Dept: MRI IMAGING | Facility: HOSPITAL | Age: 34
End: 2019-10-18

## 2019-10-18 LAB
ANION GAP SERPL CALCULATED.3IONS-SCNC: 5.7 MMOL/L (ref 5–15)
BASOPHILS # BLD AUTO: 0.03 10*3/MM3 (ref 0–0.2)
BASOPHILS NFR BLD AUTO: 0.6 % (ref 0–1.5)
BUN BLD-MCNC: 2 MG/DL (ref 6–20)
BUN/CREAT SERPL: 3.7 (ref 7–25)
CALCIUM SPEC-SCNC: 7.9 MG/DL (ref 8.6–10.5)
CHLORIDE SERPL-SCNC: 101 MMOL/L (ref 98–107)
CO2 SERPL-SCNC: 29.3 MMOL/L (ref 22–29)
CREAT BLD-MCNC: 0.54 MG/DL (ref 0.57–1)
DEPRECATED RDW RBC AUTO: 39.4 FL (ref 37–54)
EOSINOPHIL # BLD AUTO: 0 10*3/MM3 (ref 0–0.4)
EOSINOPHIL NFR BLD AUTO: 0 % (ref 0.3–6.2)
ERYTHROCYTE [DISTWIDTH] IN BLOOD BY AUTOMATED COUNT: 12.9 % (ref 12.3–15.4)
GFR SERPL CREATININE-BSD FRML MDRD: 131 ML/MIN/1.73
GLUCOSE BLD-MCNC: 102 MG/DL (ref 65–99)
HCT VFR BLD AUTO: 29.1 % (ref 34–46.6)
HGB BLD-MCNC: 9.6 G/DL (ref 12–15.9)
IMM GRANULOCYTES # BLD AUTO: 0.03 10*3/MM3 (ref 0–0.05)
IMM GRANULOCYTES NFR BLD AUTO: 0.6 % (ref 0–0.5)
LYMPHOCYTES # BLD AUTO: 1.34 10*3/MM3 (ref 0.7–3.1)
LYMPHOCYTES NFR BLD AUTO: 25.6 % (ref 19.6–45.3)
MCH RBC QN AUTO: 27.8 PG (ref 26.6–33)
MCHC RBC AUTO-ENTMCNC: 33 G/DL (ref 31.5–35.7)
MCV RBC AUTO: 84.3 FL (ref 79–97)
MONOCYTES # BLD AUTO: 0.49 10*3/MM3 (ref 0.1–0.9)
MONOCYTES NFR BLD AUTO: 9.4 % (ref 5–12)
NEUTROPHILS # BLD AUTO: 3.34 10*3/MM3 (ref 1.7–7)
NEUTROPHILS NFR BLD AUTO: 63.8 % (ref 42.7–76)
NRBC BLD AUTO-RTO: 0 /100 WBC (ref 0–0.2)
OXCARBAZEPINE: 27 UG/ML (ref 10–35)
PLATELET # BLD AUTO: 166 10*3/MM3 (ref 140–450)
PMV BLD AUTO: 9 FL (ref 6–12)
POTASSIUM BLD-SCNC: 3.5 MMOL/L (ref 3.5–5.2)
RBC # BLD AUTO: 3.45 10*6/MM3 (ref 3.77–5.28)
SODIUM BLD-SCNC: 136 MMOL/L (ref 136–145)
WBC NRBC COR # BLD: 5.23 10*3/MM3 (ref 3.4–10.8)

## 2019-10-18 PROCEDURE — 25010000002 VANCOMYCIN PER 500 MG: Performed by: NEUROLOGICAL SURGERY

## 2019-10-18 PROCEDURE — 85025 COMPLETE CBC W/AUTO DIFF WBC: CPT | Performed by: NEUROLOGICAL SURGERY

## 2019-10-18 PROCEDURE — 70553 MRI BRAIN STEM W/O & W/DYE: CPT

## 2019-10-18 PROCEDURE — 25010000002 HYDROMORPHONE PER 4 MG: Performed by: NEUROLOGICAL SURGERY

## 2019-10-18 PROCEDURE — 97110 THERAPEUTIC EXERCISES: CPT

## 2019-10-18 PROCEDURE — A9577 INJ MULTIHANCE: HCPCS | Performed by: NEUROLOGICAL SURGERY

## 2019-10-18 PROCEDURE — 0 GADOBENATE DIMEGLUMINE 529 MG/ML SOLUTION: Performed by: NEUROLOGICAL SURGERY

## 2019-10-18 PROCEDURE — 25010000002 VANCOMYCIN PER 500 MG: Performed by: INTERNAL MEDICINE

## 2019-10-18 PROCEDURE — 80048 BASIC METABOLIC PNL TOTAL CA: CPT | Performed by: NEUROLOGICAL SURGERY

## 2019-10-18 PROCEDURE — 25810000003 SODIUM CHLORIDE 0.9 % WITH KCL 20 MEQ 20-0.9 MEQ/L-% SOLUTION: Performed by: NEUROLOGICAL SURGERY

## 2019-10-18 PROCEDURE — 99024 POSTOP FOLLOW-UP VISIT: CPT | Performed by: PHYSICIAN ASSISTANT

## 2019-10-18 RX ORDER — PROMETHAZINE HYDROCHLORIDE 25 MG/ML
12.5 INJECTION, SOLUTION INTRAMUSCULAR; INTRAVENOUS EVERY 6 HOURS PRN
Status: DISCONTINUED | OUTPATIENT
Start: 2019-10-18 | End: 2019-10-21 | Stop reason: HOSPADM

## 2019-10-18 RX ORDER — HYDROCODONE BITARTRATE AND ACETAMINOPHEN 5; 325 MG/1; MG/1
2 TABLET ORAL EVERY 4 HOURS PRN
Status: DISCONTINUED | OUTPATIENT
Start: 2019-10-18 | End: 2019-10-19

## 2019-10-18 RX ORDER — HYDROMORPHONE HYDROCHLORIDE 1 MG/ML
0.5 INJECTION, SOLUTION INTRAMUSCULAR; INTRAVENOUS; SUBCUTANEOUS
Status: DISPENSED | OUTPATIENT
Start: 2019-10-18 | End: 2019-10-19

## 2019-10-18 RX ORDER — VANCOMYCIN HYDROCHLORIDE 1 G/200ML
1000 INJECTION, SOLUTION INTRAVENOUS EVERY 8 HOURS
Status: COMPLETED | OUTPATIENT
Start: 2019-10-18 | End: 2019-10-19

## 2019-10-18 RX ADMIN — CLONAZEPAM 1 MG: 1 TABLET ORAL at 13:09

## 2019-10-18 RX ADMIN — HYDROCODONE BITARTRATE AND ACETAMINOPHEN 2 TABLET: 5; 325 TABLET ORAL at 20:44

## 2019-10-18 RX ADMIN — POTASSIUM CHLORIDE AND SODIUM CHLORIDE 100 ML/HR: 900; 150 INJECTION, SOLUTION INTRAVENOUS at 03:04

## 2019-10-18 RX ADMIN — HYDROMORPHONE HYDROCHLORIDE 0.5 MG: 1 INJECTION, SOLUTION INTRAMUSCULAR; INTRAVENOUS; SUBCUTANEOUS at 00:04

## 2019-10-18 RX ADMIN — HYDROCODONE BITARTRATE AND ACETAMINOPHEN 2 TABLET: 5; 325 TABLET ORAL at 16:22

## 2019-10-18 RX ADMIN — LEVETIRACETAM 500 MG: 500 TABLET, FILM COATED ORAL at 09:53

## 2019-10-18 RX ADMIN — VANCOMYCIN HYDROCHLORIDE 1000 MG: 1 INJECTION, SOLUTION INTRAVENOUS at 22:25

## 2019-10-18 RX ADMIN — GADOBENATE DIMEGLUMINE 17 ML: 529 INJECTION, SOLUTION INTRAVENOUS at 09:18

## 2019-10-18 RX ADMIN — VANCOMYCIN HYDROCHLORIDE 1000 MG: 1 INJECTION, SOLUTION INTRAVENOUS at 16:55

## 2019-10-18 RX ADMIN — VANCOMYCIN HYDROCHLORIDE 1000 MG: 1 INJECTION, SOLUTION INTRAVENOUS at 03:56

## 2019-10-18 RX ADMIN — HYDROMORPHONE HYDROCHLORIDE 0.5 MG: 1 INJECTION, SOLUTION INTRAMUSCULAR; INTRAVENOUS; SUBCUTANEOUS at 05:26

## 2019-10-18 RX ADMIN — VENLAFAXINE HYDROCHLORIDE 37.5 MG: 37.5 CAPSULE, EXTENDED RELEASE ORAL at 22:26

## 2019-10-18 RX ADMIN — HYDROMORPHONE HYDROCHLORIDE 0.5 MG: 1 INJECTION, SOLUTION INTRAMUSCULAR; INTRAVENOUS; SUBCUTANEOUS at 20:21

## 2019-10-18 RX ADMIN — HYDROCODONE BITARTRATE AND ACETAMINOPHEN 1 TABLET: 5; 325 TABLET ORAL at 03:00

## 2019-10-18 RX ADMIN — LEVOTHYROXINE SODIUM 75 MCG: 75 TABLET ORAL at 09:52

## 2019-10-18 RX ADMIN — HYDROMORPHONE HYDROCHLORIDE 0.5 MG: 1 INJECTION, SOLUTION INTRAMUSCULAR; INTRAVENOUS; SUBCUTANEOUS at 22:37

## 2019-10-18 RX ADMIN — HYDROCODONE BITARTRATE AND ACETAMINOPHEN 1 TABLET: 5; 325 TABLET ORAL at 09:53

## 2019-10-18 RX ADMIN — LEVETIRACETAM 500 MG: 500 TABLET, FILM COATED ORAL at 22:25

## 2019-10-18 RX ADMIN — CLONAZEPAM 1 MG: 1 TABLET ORAL at 20:44

## 2019-10-18 RX ADMIN — OXCARBAZEPINE 600 MG: 300 TABLET, FILM COATED ORAL at 09:52

## 2019-10-18 RX ADMIN — OXCARBAZEPINE 600 MG: 300 TABLET, FILM COATED ORAL at 22:26

## 2019-10-18 RX ADMIN — HYDROCODONE BITARTRATE AND ACETAMINOPHEN 1 TABLET: 5; 325 TABLET ORAL at 11:40

## 2019-10-18 NOTE — PLAN OF CARE
Problem: Patient Care Overview  Goal: Plan of Care Review   10/18/19 1615   Coping/Psychosocial   Plan of Care Reviewed With patient   OTHER   Outcome Summary Ambulated 400' SBA without an AD- declined stair training today 2/2 HA and mouth pain but likely will need to practice prior to DC. Anticipate home upon DC as patient is close to meeting all PT goals.

## 2019-10-18 NOTE — PROGRESS NOTES
"      [facility]         Dr Garza Sayied   LOS: 2 days   Patient Care Team:  Guy Kelly MD as PCP - General (Family Medicine)    Chief Complaint: Status post craniotomy for right temporal meningioma recent intractable seizures migraine headaches smoker    Interval History continues to have a headache and associated nausea per patient.  She still has difficulty opening her mouth postop.    REVIEW OF SYSTEMS:   CARDIOVASCULAR: No chest pain, chest pressure or chest discomfort. No palpitations or edema.   RESPIRATORY: No shortness of breath, cough or sputum.   GASTROINTESTINAL: No anorexia, nausea, vomiting or diarrhea. No abdominal pain or blood.   HEMATOLOGIC: No bleeding or bruising.     Ventilator/Non-Invasive Ventilation Settings (From admission, onward)    None            Vital Signs  Temp:  [97.3 °F (36.3 °C)-98.8 °F (37.1 °C)] 98.8 °F (37.1 °C)  Heart Rate:  [50-66] 66  Resp:  [16-18] 18  BP: ()/(54-76) 99/64    Intake/Output Summary (Last 24 hours) at 10/18/2019 1001  Last data filed at 10/18/2019 0500  Gross per 24 hour   Intake 1760 ml   Output 60 ml   Net 1700 ml     Flowsheet Rows      First Filed Value   Admission Height  170.2 cm (67\") Documented at 10/16/2019 1153   Admission Weight  86.3 kg (190 lb 4.1 oz) Documented at 10/16/2019 1153          Physical Exam:   General Appearance:    Alert, cooperative, in no acute distress  Head surgical dressing from right frontal craniotomy frontotemporal.  Pupils reactive to light.   Lungs:     Clear to auscultation,respirations regular, even and                  unlabored    Heart:    Regular rhythm and normal rate, normal S1 and S2, no            murmur, no gallop, no rub, no click   Chest Wall:    No abnormalities observed   Abdomen:     Normal bowel sounds, no masses, no organomegaly, soft        non-tender, non-distended, no guarding, no rebound                tenderness   Extremities:   Moves all extremities well, no edema, no " cyanosis, no             redness     Results Review:        Results from last 7 days   Lab Units 10/18/19  0519 10/17/19  0523 10/16/19  2140   SODIUM mmol/L 136 135* 137   POTASSIUM mmol/L 3.5 3.8 3.8   CHLORIDE mmol/L 101 100 101   CO2 mmol/L 29.3* 28.9 25.8   BUN mg/dL 2* 3* 3*   CREATININE mg/dL 0.54* 0.42* 0.57   GLUCOSE mg/dL 102* 120* 159*   CALCIUM mg/dL 7.9* 8.3* 8.3*         Results from last 7 days   Lab Units 10/18/19  0519 10/17/19  0523 10/16/19  2140   WBC 10*3/mm3 5.23 4.69 6.04   HEMOGLOBIN g/dL 9.6* 9.8* 10.7*   HEMATOCRIT % 29.1* 30.1* 32.6*   PLATELETS 10*3/mm3 166 188 199     Results from last 7 days   Lab Units 10/17/19  0523   INR  1.24*   APTT seconds 31.1                       I reviewed the patient's new clinical results.  I personally viewed and interpreted the patient's CXR        Medication Review:     levETIRAcetam 500 mg Oral BID   levothyroxine 75 mcg Oral Q AM   OXcarbazepine 600 mg Oral Q12H   vancomycin 1,000 mg Intravenous Q12H   venlafaxine XR 37.5 mg Oral Nightly         niCARdipine 5-15 mg/hr    sodium chloride 0.9 % with KCl 20 mEq 100 mL/hr Last Rate: 100 mL/hr (10/18/19 0304)       ASSESSMENT:   PCCM Problems  Craniotomy for right temporal meningioma  Recent intractable seizures  Migraine  POS  Recent smoker    PLAN:  Neurochecks per protocol has been stable  She is having issues with headache and difficulty opening her mouth due to surgery.  I will let neurosurgery manage this.  Not sure if she needs to see an ENT.  Keppra for seizure management  Plans to quit smoking long-term  ICU core measures  Per neurosurgery  Transfer out of the ICU     Kayla Rondon MD  10/18/19  10:01 AM

## 2019-10-18 NOTE — PROGRESS NOTES
"Doing ok. Has some HA, nausea.  Zofran not helping with nausea much.     CT yesterday ok. Brain MRI completed this am, results pending.     Blood pressure 99/64, pulse 66, temperature 98.8 °F (37.1 °C), temperature source Oral, resp. rate 18, height 170.2 cm (67\"), weight 86.3 kg (190 lb 4.1 oz), last menstrual period 10/12/2019, SpO2 99 %.      AA, Ox3  Incision ok  SOOT well  Lungs effort normal      ..  Results from last 7 days   Lab Units 10/18/19  0519   WBC 10*3/mm3 5.23   HEMOGLOBIN g/dL 9.6*   HEMATOCRIT % 29.1*   PLATELETS 10*3/mm3 166       ..  Results from last 7 days   Lab Units 10/18/19  0519   SODIUM mmol/L 136   POTASSIUM mmol/L 3.5   CHLORIDE mmol/L 101   CO2 mmol/L 29.3*   BUN mg/dL 2*   CREATININE mg/dL 0.54*   GLUCOSE mg/dL 102*   CALCIUM mg/dL 7.9*       ..  Results from last 7 days   Lab Units 10/17/19  0523   INR  1.24*       POD#2 s/p R F-P crani for meningioma  Path pending  HA-cont with pain control  Nausea-add phenergan prn  Mobilize  Go to floor  Follow for MRI results  Likely home this weekend when Nausea, HA better, tolerating diet and up and walking well  "

## 2019-10-18 NOTE — THERAPY TREATMENT NOTE
Patient Name: Desiree Peterson  : 1985    MRN: [id number]                              Today's Date: 10/18/2019       Admit Date: 10/16/2019    Visit Dx:     ICD-10-CM ICD-9-CM   1. Meningioma (CMS/HCC) D32.9 225.2     Patient Active Problem List   Diagnosis   • Meningioma (CMS/HCC)   • Paresthesia   • Atypical migraine   • Hypokalemia   • Migraine aura without headache   • Cervical radiculopathy   • Cerebral meningioma (CMS/HCC)     Past Medical History:   Diagnosis Date   • Abnormal menses    • Anxiety and depression    • Atrial flutter (CMS/HCC)    • Bartholin cyst     X2   • Brain tumor (CMS/HCC)    • Cervical pain (neck)    • Cyst, sinus nasal    • Dumping syndrome    • Heart murmur    • History of anemia    • Meningioma (CMS/HCC)    • Mid back pain    • Migraines    • POTS (postural orthostatic tachycardia syndrome)    • Seizures (CMS/HCC)    • SOB (shortness of breath)    • Syncope and collapse    • Thyroid nodule    • Tinnitus of both ears      Past Surgical History:   Procedure Laterality Date   • CRANIOTOMY FOR TUMOR Right 10/16/2019    Procedure: Right frontotemporal craniotomy for tumor;  Surgeon: Nas Coats MD;  Location: [facility];  Service: Neurosurgery   • FINGER SURGERY      Cyst removal     General Information     Row Name 10/18/19 1611          PT Evaluation Time/Intention    Document Type  therapy note (daily note)  -MS     Mode of Treatment  physical therapy  -MS     Row Name 10/18/19 1611          General Information    Existing Precautions/Restrictions  fall  -MS     Row Name 10/18/19 1611          Home Main Entrance    Number of Stairs, Main Entrance  five  -MS       User Key  (r) = Recorded By, (t) = Taken By, (c) = Cosigned By    Initials Name Provider Type    MS May Marin, PT Physical Therapist        Mobility     Row Name 10/18/19 1611          Bed Mobility Assessment/Treatment    [place] Level (Bed Mobility)  supervision  -MS     Supine-Sit  [place] (Bed Mobility)  supervision  -MS     Assistive Device (Bed Mobility)  bed rails  -MS     Row Name 10/18/19 1611          Sit-Stand Transfer    Sit-Stand [place] (Transfers)  stand by assist;verbal cues  -MS     Row Name 10/18/19 1611          Gait/Stairs Assessment/Training    Gait/Stairs Assessment/Training  gait/ambulation independence  -MS     [place] Level (Gait)  stand by assist;verbal cues;nonverbal cues (demo/gesture)  -MS     Assistive Device (Gait)  walker, front-wheeled  -MS     Distance in Feet (Gait)  400  -MS     Comment (Gait/Stairs)  No LOB or veering noted- reported HA and dizziness throughout.  -MS       User Key  (r) = Recorded By, (t) = Taken By, (c) = Cosigned By    Initials Name Provider Type    May Potts, PT Physical Therapist        Obj/Interventions     Row Name 10/18/19 1613          Static Sitting Balance    Level of [place] (Unsupported Sitting, Static Balance)  independent  -MS     Sitting Position (Unsupported Sitting, Static Balance)  sitting on edge of bed  -MS     Row Name 10/18/19 1613          Static Standing Balance    Level of [place] (Supported Standing, Static Balance)  supervision  -MS       User Key  (r) = Recorded By, (t) = Taken By, (c) = Cosigned By    Initials Name Provider Type    May Potts, PT Physical Therapist        Goals/Plan    No documentation.       Clinical Impression     Row Name 10/18/19 1613          Pain Scale: Numbers Pre/Post-Treatment    Pain Scale: Numbers, Pretreatment  5/10  -MS     Pain Scale: Numbers, Post-Treatment  5/10  -MS     Pain Location  head  -MS     Row Name 10/18/19 1613          Vital Signs    O2 Delivery Pre Treatment  room air  -MS       User Key  (r) = Recorded By, (t) = Taken By, (c) = Cosigned By    Initials Name Provider Type    May Potts, PT Physical Therapist        Outcome Measures     Row Name 10/18/19 1616          How much help from another person do you  currently need...    Turning from your back to your side while in flat bed without using bedrails?  4  -MS     Moving from lying on back to sitting on the side of a flat bed without bedrails?  4  -MS     Moving to and from a bed to a chair (including a wheelchair)?  4  -MS     Standing up from a chair using your arms (e.g., wheelchair, bedside chair)?  4  -MS     Climbing 3-5 steps with a railing?  3  -MS     To walk in hospital room?  4  -MS     AM-PAC 6 Clicks Score (PT)  23  -MS       User Key  (r) = Recorded By, (t) = Taken By, (c) = Cosigned By    Initials Name Provider Type    MS May Marin, PT Physical Therapist        Physical Therapy Education     Title: PT OT SLP Therapies (In Progress)     Topic: Physical Therapy (In Progress)     Point: Mobility training (Done)     Learning Progress Summary           Patient Acceptance, E, VU,NR by MS at 10/18/2019  4:16 PM    Acceptance, E, VU by AP at 10/17/2019 11:50 AM                   Point: Body mechanics (Done)     Learning Progress Summary           Patient Acceptance, E, VU,NR by MS at 10/18/2019  4:16 PM    Acceptance, E, VU by AP at 10/17/2019 11:50 AM                   Point: Precautions (Done)     Learning Progress Summary           Patient Acceptance, E, VU,NR by MS at 10/18/2019  4:16 PM    Acceptance, E, VU by AP at 10/17/2019 11:50 AM                               User Key     Initials Effective Dates Name Provider Type Discipline    MS 03/04/19 -  May Marin, PT Physical Therapist PT    AP 09/04/19 -  Melissa Mortensen PT Student PT Student PT              PT Recommendation and Plan     Plan of Care Reviewed With: patient  Outcome Summary: Ambulated 400' SBA without an AD- declined stair training today 2/2 HA and mouth pain but likely will need to practice prior to DC. Anticipate home upon DC as patient is close to meeting all PT goals.     Time Calculation:   PT Charges     Row Name 10/18/19 7515             Time Calculation    Start Time   1600  -MS      Stop Time  1611  -MS      Time Calculation (min)  11 min  -MS      PT Received On  10/18/19  -MS      PT - Next Appointment  10/19/19  -MS        User Key  (r) = Recorded By, (t) = Taken By, (c) = Cosigned By    Initials Name Provider Type    May Potts, PT Physical Therapist        Therapy Charges for Today     Code Description Service Date Service Provider Modifiers Qty    51275425994 HC PT THER PROC EA 15 MIN 10/18/2019 May Marin, PT GP 1    19231653133 HC PT THER SUPP EA 15 MIN 10/18/2019 May Marin, PT GP 1          PT G-Codes  Outcome Measure Options: AM-PAC 6 Clicks Daily Activity (OT)  AM-PAC 6 Clicks Score (PT): 23  AM-PAC 6 Clicks Score (OT): 19  Modified [place] Scale: 4 - Moderately severe disability.  Unable to walk without assistance, and unable to attend to own bodily needs without assistance.    May Marin, FLORES  10/18/2019

## 2019-10-19 LAB
ANION GAP SERPL CALCULATED.3IONS-SCNC: 11 MMOL/L (ref 5–15)
BASOPHILS # BLD AUTO: 0.02 10*3/MM3 (ref 0–0.2)
BASOPHILS NFR BLD AUTO: 0.4 % (ref 0–1.5)
BUN BLD-MCNC: 3 MG/DL (ref 6–20)
BUN/CREAT SERPL: 5.7 (ref 7–25)
CALCIUM SPEC-SCNC: 8.2 MG/DL (ref 8.6–10.5)
CHLORIDE SERPL-SCNC: 95 MMOL/L (ref 98–107)
CO2 SERPL-SCNC: 27 MMOL/L (ref 22–29)
CREAT BLD-MCNC: 0.53 MG/DL (ref 0.57–1)
DEPRECATED RDW RBC AUTO: 39.2 FL (ref 37–54)
EOSINOPHIL # BLD AUTO: 0 10*3/MM3 (ref 0–0.4)
EOSINOPHIL NFR BLD AUTO: 0 % (ref 0.3–6.2)
ERYTHROCYTE [DISTWIDTH] IN BLOOD BY AUTOMATED COUNT: 12.8 % (ref 12.3–15.4)
GFR SERPL CREATININE-BSD FRML MDRD: 134 ML/MIN/1.73
GLUCOSE BLD-MCNC: 94 MG/DL (ref 65–99)
HCT VFR BLD AUTO: 30.6 % (ref 34–46.6)
HGB BLD-MCNC: 9.9 G/DL (ref 12–15.9)
IMM GRANULOCYTES # BLD AUTO: 0.02 10*3/MM3 (ref 0–0.05)
IMM GRANULOCYTES NFR BLD AUTO: 0.4 % (ref 0–0.5)
LYMPHOCYTES # BLD AUTO: 1.38 10*3/MM3 (ref 0.7–3.1)
LYMPHOCYTES NFR BLD AUTO: 24.7 % (ref 19.6–45.3)
MCH RBC QN AUTO: 27.5 PG (ref 26.6–33)
MCHC RBC AUTO-ENTMCNC: 32.4 G/DL (ref 31.5–35.7)
MCV RBC AUTO: 85 FL (ref 79–97)
MONOCYTES # BLD AUTO: 0.57 10*3/MM3 (ref 0.1–0.9)
MONOCYTES NFR BLD AUTO: 10.2 % (ref 5–12)
NEUTROPHILS # BLD AUTO: 3.59 10*3/MM3 (ref 1.7–7)
NEUTROPHILS NFR BLD AUTO: 64.3 % (ref 42.7–76)
NRBC BLD AUTO-RTO: 0 /100 WBC (ref 0–0.2)
PLATELET # BLD AUTO: 185 10*3/MM3 (ref 140–450)
PMV BLD AUTO: 9.1 FL (ref 6–12)
POTASSIUM BLD-SCNC: 3.3 MMOL/L (ref 3.5–5.2)
RBC # BLD AUTO: 3.6 10*6/MM3 (ref 3.77–5.28)
SODIUM BLD-SCNC: 133 MMOL/L (ref 136–145)
WBC NRBC COR # BLD: 5.58 10*3/MM3 (ref 3.4–10.8)

## 2019-10-19 PROCEDURE — 97110 THERAPEUTIC EXERCISES: CPT

## 2019-10-19 PROCEDURE — 25010000002 HYDROMORPHONE PER 4 MG: Performed by: NEUROLOGICAL SURGERY

## 2019-10-19 PROCEDURE — 99024 POSTOP FOLLOW-UP VISIT: CPT | Performed by: NEUROLOGICAL SURGERY

## 2019-10-19 PROCEDURE — 85025 COMPLETE CBC W/AUTO DIFF WBC: CPT | Performed by: NEUROLOGICAL SURGERY

## 2019-10-19 PROCEDURE — 99255 IP/OBS CONSLTJ NEW/EST HI 80: CPT | Performed by: PSYCHIATRY & NEUROLOGY

## 2019-10-19 PROCEDURE — 80048 BASIC METABOLIC PNL TOTAL CA: CPT | Performed by: NEUROLOGICAL SURGERY

## 2019-10-19 RX ORDER — OXYCODONE AND ACETAMINOPHEN 10; 325 MG/1; MG/1
2 TABLET ORAL EVERY 4 HOURS PRN
Status: DISCONTINUED | OUTPATIENT
Start: 2019-10-19 | End: 2019-10-21 | Stop reason: HOSPADM

## 2019-10-19 RX ORDER — OXYCODONE AND ACETAMINOPHEN 10; 325 MG/1; MG/1
1 TABLET ORAL EVERY 4 HOURS PRN
Status: DISCONTINUED | OUTPATIENT
Start: 2019-10-19 | End: 2019-10-21 | Stop reason: HOSPADM

## 2019-10-19 RX ORDER — HYDROMORPHONE HYDROCHLORIDE 1 MG/ML
0.5 INJECTION, SOLUTION INTRAMUSCULAR; INTRAVENOUS; SUBCUTANEOUS ONCE
Status: COMPLETED | OUTPATIENT
Start: 2019-10-19 | End: 2019-10-19

## 2019-10-19 RX ORDER — OXCARBAZEPINE 150 MG/1
150 TABLET, FILM COATED ORAL ONCE
Status: COMPLETED | OUTPATIENT
Start: 2019-10-19 | End: 2019-10-19

## 2019-10-19 RX ADMIN — HYDROMORPHONE HYDROCHLORIDE 0.5 MG: 1 INJECTION, SOLUTION INTRAMUSCULAR; INTRAVENOUS; SUBCUTANEOUS at 01:13

## 2019-10-19 RX ADMIN — HYDROMORPHONE HYDROCHLORIDE 0.5 MG: 1 INJECTION, SOLUTION INTRAMUSCULAR; INTRAVENOUS; SUBCUTANEOUS at 05:02

## 2019-10-19 RX ADMIN — OXYCODONE HYDROCHLORIDE AND ACETAMINOPHEN 1 TABLET: 10; 325 TABLET ORAL at 20:02

## 2019-10-19 RX ADMIN — OXCARBAZEPINE 150 MG: 150 TABLET, FILM COATED ORAL at 15:29

## 2019-10-19 RX ADMIN — HYDROCODONE BITARTRATE AND ACETAMINOPHEN 2 TABLET: 5; 325 TABLET ORAL at 01:13

## 2019-10-19 RX ADMIN — LEVETIRACETAM 500 MG: 500 TABLET, FILM COATED ORAL at 20:20

## 2019-10-19 RX ADMIN — LEVOTHYROXINE SODIUM 75 MCG: 75 TABLET ORAL at 08:11

## 2019-10-19 RX ADMIN — HYDROCODONE BITARTRATE AND ACETAMINOPHEN 2 TABLET: 5; 325 TABLET ORAL at 09:21

## 2019-10-19 RX ADMIN — HYDROCODONE BITARTRATE AND ACETAMINOPHEN 2 TABLET: 5; 325 TABLET ORAL at 15:30

## 2019-10-19 RX ADMIN — LEVETIRACETAM 500 MG: 500 TABLET, FILM COATED ORAL at 08:11

## 2019-10-19 RX ADMIN — OXCARBAZEPINE 600 MG: 300 TABLET, FILM COATED ORAL at 08:12

## 2019-10-19 RX ADMIN — HYDROCODONE BITARTRATE AND ACETAMINOPHEN 2 TABLET: 5; 325 TABLET ORAL at 05:02

## 2019-10-19 RX ADMIN — VENLAFAXINE HYDROCHLORIDE 37.5 MG: 37.5 CAPSULE, EXTENDED RELEASE ORAL at 20:20

## 2019-10-19 RX ADMIN — HYDROMORPHONE HYDROCHLORIDE 0.5 MG: 1 INJECTION, SOLUTION INTRAMUSCULAR; INTRAVENOUS; SUBCUTANEOUS at 18:16

## 2019-10-19 RX ADMIN — OXCARBAZEPINE 750 MG: 300 TABLET, FILM COATED ORAL at 20:20

## 2019-10-19 NOTE — PLAN OF CARE
Problem: Patient Care Overview  Goal: Plan of Care Review   10/19/19 5200   Coping/Psychosocial   Plan of Care Reviewed With patient   OTHER   Outcome Summary Patient amb 400 ft with hand held assist, and also amb up and [place] 7 steps with railing and CGA. She c/o significant headache throughout the session. Will benefit from cont skilled physical therapy one more time to ensure cont functional mobility, and then possible discharge from physical therapy.    Plan of Care Review   Progress improving

## 2019-10-19 NOTE — PLAN OF CARE
Problem: Patient Care Overview  Goal: Plan of Care Review  Outcome: Ongoing (interventions implemented as appropriate)   10/19/19 0629 10/19/19 3673   Coping/Psychosocial   Plan of Care Reviewed With patient;family --    OTHER   Outcome Summary --  Neurology consulted with Pt. Increased Trileptal dose. C/o pain controlled with PO meds. No seizure activity for shift. VSS will monitor   Plan of Care Review   Progress no change --      Goal: Individualization and Mutuality  Outcome: Ongoing (interventions implemented as appropriate)    Goal: Discharge Needs Assessment  Outcome: Ongoing (interventions implemented as appropriate)    Goal: Interprofessional Rounds/Family Conf  Outcome: Ongoing (interventions implemented as appropriate)      Problem: Pain, Chronic (Adult)  Goal: Identify Related Risk Factors and Signs and Symptoms  Outcome: Ongoing (interventions implemented as appropriate)    Goal: Acceptable Pain/Comfort Level and Functional Ability  Outcome: Ongoing (interventions implemented as appropriate)      Problem: Fall Risk (Adult)  Goal: Identify Related Risk Factors and Signs and Symptoms  Outcome: Ongoing (interventions implemented as appropriate)    Goal: Absence of Fall  Outcome: Ongoing (interventions implemented as appropriate)      Problem: Skin Injury Risk (Adult)  Goal: Identify Related Risk Factors and Signs and Symptoms  Outcome: Ongoing (interventions implemented as appropriate)    Goal: Skin Health and Integrity  Outcome: Ongoing (interventions implemented as appropriate)      Problem: Seizure Disorder/Epilepsy (Adult)  Goal: Signs and Symptoms of Listed Potential Problems Will be Absent, Minimized or Managed (Seizure Disorder/Epilepsy)  Outcome: Ongoing (interventions implemented as appropriate)

## 2019-10-19 NOTE — PLAN OF CARE
Problem: Patient Care Overview  Goal: Plan of Care Review  Outcome: Ongoing (interventions implemented as appropriate)   10/19/19 0632   Coping/Psychosocial   Plan of Care Reviewed With patient;family   OTHER   Outcome Summary Pt had witnessed seizure lasting approxiametly three minutes. Patient was not responding to questions or stimuli. Patient eyes noted to roll back into her head. Neurology consulted. No more seizure activity tonight. C/o incisional pain. Given pain medications per orders. Right sided facial swelling has decreased w/ use of ice packs and patient reports some relief. Family remains at the bedside with her.   Plan of Care Review   Progress no change       Problem: Craniotomy/Craniectomy/Cranioplasty (Adult)  Goal: Signs and Symptoms of Listed Potential Problems Will be Absent, Minimized or Managed (Craniotomy/Craniectomy/Cranioplasty)  Outcome: Ongoing (interventions implemented as appropriate)      Problem: Seizure Disorder/Epilepsy (Adult)  Goal: Signs and Symptoms of Listed Potential Problems Will be Absent, Minimized or Managed (Seizure Disorder/Epilepsy)  Outcome: Ongoing (interventions implemented as appropriate)

## 2019-10-19 NOTE — CONSULTS
Neurology Consult Note    Referring Provider: Dr. Coats  Reason for Consultation: Seizure disorder.    History of present illness:    The patient is a 33-year-old woman admitted on 10/16/2019 for resection of right temporal meningioma.  This was performed by craniotomy on 10/16/2019.  Patient was monitored in the intensive care unit for 48 hours and did well, transferred out yesterday.    Patient is on several seizure medications including Keppra 500 mg twice daily and Trileptal 600 mg twice daily.    Seizure history is obtained from the patient and her family at the bedside.    She has several types of seizures, followed by a neurologist in [place].  She has had several tonic-clonic seizures most recently 1 month ago.  Her most frequent seizure is complex partial type with altered mental status, twitching and decreased responsiveness.  This is the type of seizure she had last night.  This was the first seizure since surgery on 10/16.    She has been on oxcarbazepine 600 mg twice daily.  The complex partial type of seizure was increasing recently occurring several times per day.  This prompted her to come into the emergency room 1 week ago where Keppra was added to oxcarbazepine.  She continued to have at least one complex partial seizure per day after the addition of Keppra.      Past Medical History  Past Medical History:   Diagnosis Date   • Abnormal menses    • Anxiety and depression    • Atrial flutter (CMS/HCC)    • Bartholin cyst     X2   • Brain tumor (CMS/HCC)    • Cervical pain (neck)    • Cyst, sinus nasal    • Dumping syndrome    • Heart murmur    • History of anemia    • Meningioma (CMS/HCC)    • Mid back pain    • Migraines    • POTS (postural orthostatic tachycardia syndrome)    • Seizures (CMS/HCC)    • SOB (shortness of breath)    • Syncope and collapse    • Thyroid nodule    • Tinnitus of both ears        Past Surgical History  Past Surgical History:   Procedure Laterality Date   • CRANIOTOMY  FOR TUMOR Right 10/16/2019    Procedure: Right frontotemporal craniotomy for tumor;  Surgeon: Nas Coats MD;  Location: [facility];  Service: Neurosurgery   • FINGER SURGERY      Cyst removal       Family History  Family History   Problem Relation Age of Onset   • No Known Problems Mother    • No Known Problems Father    • Malig Hyperthermia Neg Hx        Allergies   Allergen Reactions   • Cefdinir Rash   • Penicillins Rash       Social History  Social History     Socioeconomic History   • Marital status: Single     Spouse name: Not on file   • Number of children: 1   • Years of education: College   • Highest education level: Not on file   Occupational History   • Occupation: Unemployed   Tobacco Use   • Smoking status: Former Smoker     Packs/day: 1.00     Years: 3.00     Pack years: 3.00     Types: Cigarettes     Start date:      Last attempt to quit: 10/15/2019     Years since quittin.0   • Smokeless tobacco: Never Used   Substance and Sexual Activity   • Alcohol use: No   • Drug use: No   • Sexual activity: Defer       Review of Systems   HENT: Positive for facial swelling.    Neurological: Positive for dizziness.   All other systems reviewed and are negative.      Medications  Scheduled Meds:  levETIRAcetam 500 mg Oral BID   levothyroxine 75 mcg Oral Q AM   OXcarbazepine 600 mg Oral Q12H   venlafaxine XR 37.5 mg Oral Nightly     Continuous Infusions:   PRN Meds:.•  clonazePAM  •  HYDROcodone-acetaminophen  •  HYDROcodone-acetaminophen  •  [DISCONTINUED] ondansetron **OR** ondansetron  •  ondansetron  •  ondansetron ODT  •  promethazine    Vital Signs   Temp:  [97.7 °F (36.5 °C)-99.1 °F (37.3 °C)] 97.7 °F (36.5 °C)  Heart Rate:  [58-87] 58  Resp:  [15-18] 17  BP: ()/(59-70) 97/61    Examination:  Constitutional: Well-groomed, well-nourished  HENT: Right scalp with postsurgical changes  Eyes: Normal conjunctiva  CVS:  Regular rate and rhythm.  No murmurs.  Good peripheral perfusion.    Resp :   Non labored respirations  Musculoskeletal:  No signs of peripheral edema  Skin:  No rash, normal turgor  Neurologic:   Alert oriented and fluent   No dysarthria   Cranial nerves intact, no nystagmus   Pupils symmetric and equally reactive   Normal power (5/5) in all extremities proximally and distally   Normal coordination   Reflexes symmetric and not hyperactive   Plantar responses flexor   Sensory exam grossly intact   Gait not tested  Psychiatric: No anxiety, normal mood    Results Review:  Results from last 7 days   Lab Units 10/19/19  0501 10/18/19  0519 10/17/19  0523   WBC 10*3/mm3 5.58 5.23 4.69   HEMOGLOBIN g/dL 9.9* 9.6* 9.8*   HEMATOCRIT % 30.6* 29.1* 30.1*   PLATELETS 10*3/mm3 185 166 188        Results from last 7 days   Lab Units 10/19/19  0501 10/18/19  0519 10/17/19  0523  10/15/19  0038   SODIUM mmol/L 133* 136 135*   < > 136   POTASSIUM mmol/L 3.3* 3.5 3.8   < > 3.4*   CHLORIDE mmol/L 95* 101 100   < > 97*   CO2 mmol/L 27.0 29.3* 28.9   < > 29.1*   BUN mg/dL 3* 2* 3*   < > 7   CREATININE mg/dL 0.53* 0.54* 0.42*   < > 0.68   CALCIUM mg/dL 8.2* 7.9* 8.3*   < > 8.7   BILIRUBIN mg/dL  --   --   --   --  0.2   ALK PHOS U/L  --   --   --   --  66   ALT (SGPT) U/L  --   --   --   --  10   AST (SGOT) U/L  --   --   --   --  8   GLUCOSE mg/dL 94 102* 120*   < > 94    < > = values in this interval not displayed.      No results found for: NSXFLJLC33  Lab Results   Component Value Date    TSH 1.130 06/24/2019     Oxcarbazepine level 10/13/2019 27       Brain MRI with postoperative changes images reviewed independently    Medical Decision Making and Recommendations  Complex partial seizures  New problem to patient and this examiner  Secondary to right temporal meningioma status post resection  Breakthrough complex partial seizure overnight    Advised patient that she will not see complete resolution of complex partial seizures following surgery.  However this is the first event she has had since  surgery 2 days ago so this is an encouraging trend.    Would prefer to have the patient on monotherapy with oxcarbazepine.  Keppra recently added by the emergency room and did not seem to improve seizure frequency.  Therefore will increase oxcarbazepine to 750 mg twice daily, giving an additional 150 mg now.    I discussed these findings and recommendations with patient and family.  All questions addressed.    Lyssa Kelley MD  10/19/19  11:56 AM

## 2019-10-19 NOTE — THERAPY TREATMENT NOTE
Patient Name: Desiree Petesron  : 1985    MRN: [id number]                              Today's Date: 10/19/2019       Admit Date: 10/16/2019    Visit Dx:     ICD-10-CM ICD-9-CM   1. Meningioma (CMS/HCC) D32.9 225.2     Patient Active Problem List   Diagnosis   • Meningioma (CMS/HCC)   • Paresthesia   • Atypical migraine   • Hypokalemia   • Migraine aura without headache   • Cervical radiculopathy   • Cerebral meningioma (CMS/HCC)     Past Medical History:   Diagnosis Date   • Abnormal menses    • Anxiety and depression    • Atrial flutter (CMS/HCC)    • Bartholin cyst     X2   • Brain tumor (CMS/HCC)    • Cervical pain (neck)    • Cyst, sinus nasal    • Dumping syndrome    • Heart murmur    • History of anemia    • Meningioma (CMS/HCC)    • Mid back pain    • Migraines    • POTS (postural orthostatic tachycardia syndrome)    • Seizures (CMS/HCC)    • SOB (shortness of breath)    • Syncope and collapse    • Thyroid nodule    • Tinnitus of both ears      Past Surgical History:   Procedure Laterality Date   • CRANIOTOMY FOR TUMOR Right 10/16/2019    Procedure: Right frontotemporal craniotomy for tumor;  Surgeon: Nas Coats MD;  Location: [facility];  Service: Neurosurgery   • FINGER SURGERY      Cyst removal     General Information     Row Name 10/19/19 1658          PT Evaluation Time/Intention    Document Type  therapy note (daily note)  -MM     Mode of Treatment  individual therapy  -MM     Row Name 10/19/19 1658          General Information    Patient Profile Reviewed?  yes  -MM       User Key  (r) = Recorded By, (t) = Taken By, (c) = Cosigned By    Initials Name Provider Type    MM Ashley Simpson, PT Physical Therapist        Mobility     Row Name 10/19/19 1658          Bed Mobility Assessment/Treatment    Bed Mobility Assessment/Treatment  bed mobility (all) activities  -MM     [place] Level (Bed Mobility)  independent  -MM     Row Name 10/19/19 1658          Sit-Stand Transfer     Sit-Stand [place] (Transfers)  contact guard  -MM     Row Name 10/19/19 1658          Gait/Stairs Assessment/Training    Gait/Stairs Assessment/Training  gait/ambulation independence  -MM     [place] Level (Gait)  contact guard  -MM     Assistive Device (Gait)  -- Hand held assist  -MM     Distance in Feet (Gait)  400  -MM     Deviations/Abnormal Patterns (Gait)  nathanael decreased  -MM     [place] Level (Stairs)  contact guard  -MM     Assistive Device (Stairs)  other (see comments) Railing  -MM     Number of Steps (Stairs)  7  -MM     Ascending Technique (Stairs)  step-over-step  -MM       User Key  (r) = Recorded By, (t) = Taken By, (c) = Cosigned By    Initials Name Provider Type    Ashley Barba PT Physical Therapist        Obj/Interventions     Row Name 10/19/19 1700          Static Sitting Balance    Level of [place] (Unsupported Sitting, Static Balance)  independent  -MM     Row Name 10/19/19 1700          Dynamic Sitting Balance    Level of [place], Reaches Outside Midline (Sitting, Dynamic Balance)  independent  -MM     Row Name 10/19/19 1700          Static Standing Balance    Level of [place] (Supported Standing, Static Balance)  contact guard assist  -MM     Row Name 10/19/19 1700          Dynamic Standing Balance    Level of [place], Reaches Outside Midline (Standing, Dynamic Balance)  contact guard assist  -MM       User Key  (r) = Recorded By, (t) = Taken By, (c) = Cosigned By    Initials Name Provider Type    Ashley Barba PT Physical Therapist        Goals/Plan    No documentation.       Clinical Impression     Row Name 10/19/19 1701          Pain Scale: Numbers Pre/Post-Treatment    Pain Scale: Numbers, Pretreatment  8/10  -MM     Pain Scale: Numbers, Post-Treatment  8/10  -MM     Pain Location - Side  Right  -MM     Pain Location  head  -MM     Pain Intervention(s)  Medication (See MAR);Cold pack  -MM     Row Name 10/19/19 1701          Plan of Care  Review    Plan of Care Reviewed With  patient  -MM     Row Name 10/19/19 1701          Physical Therapy Clinical Impression    Criteria for Skilled Interventions Met (PT Clinical Impression)  yes  -MM       User Key  (r) = Recorded By, (t) = Taken By, (c) = Cosigned By    Initials Name Provider Type    Ashley Barba PT Physical Therapist        Outcome Measures     Row Name 10/19/19 1705          How much help from another person do you currently need...    Turning from your back to your side while in flat bed without using bedrails?  4  -MM     Moving from lying on back to sitting on the side of a flat bed without bedrails?  4  -MM     Moving to and from a bed to a chair (including a wheelchair)?  3  -MM     Standing up from a chair using your arms (e.g., wheelchair, bedside chair)?  4  -MM     Climbing 3-5 steps with a railing?  3  -MM     To walk in hospital room?  3  -MM     AM-PAC 6 Clicks Score (PT)  21  -MM     Row Name 10/19/19 1705          Functional Assessment    Outcome Measure Options  AM-PAC 6 Clicks Basic Mobility (PT)  -MM       User Key  (r) = Recorded By, (t) = Taken By, (c) = Cosigned By    Initials Name Provider Type    Ashley Barba PT Physical Therapist        Physical Therapy Education     Title: PT OT SLP Therapies (In Progress)     Topic: Physical Therapy (In Progress)     Point: Mobility training (Done)     Learning Progress Summary           Patient Acceptance, E, VU,DU by MM at 10/19/2019  5:02 PM    Acceptance, E, VU,NR by MS at 10/18/2019  4:16 PM    Acceptance, E, VU by AP at 10/17/2019 11:50 AM                   Point: Body mechanics (Done)     Learning Progress Summary           Patient Acceptance, E, VU,NR by MS at 10/18/2019  4:16 PM    Acceptance, E, VU by AP at 10/17/2019 11:50 AM                   Point: Precautions (Done)     Learning Progress Summary           Patient Acceptance, E, VU,NR by MS at 10/18/2019  4:16 PM    Acceptance, E, VU by AP at 10/17/2019 11:50  AM                               User Key     Initials Effective Dates Name Provider Type Discipline    MM 04/03/18 -  Ashley Simpson, PT Physical Therapist PT    MS 03/04/19 -  May Marin PT Physical Therapist PT    AP 09/04/19 -  Melissa Mortensen, FLORES Student PT Student PT              PT Recommendation and Plan     Plan of Care Reviewed With: patient  Progress: improving  Outcome Summary: Patient amb 400 ft with hand held assist, and also amb up and [place] 7 steps with railing and CGA. She c/o significant headache throughout the session.  Will benefit from cont skilled physical therapy one more time to ensure cont functional mobility, and then possible discharge from physical therapy.      Time Calculation:   PT Charges     Row Name 10/19/19 1705             Time Calculation    Start Time  1639  -MM      Stop Time  1653  -MM      Time Calculation (min)  14 min  -MM      PT Received On  10/19/19  -MM      PT - Next Appointment  10/20/19  -MM        User Key  (r) = Recorded By, (t) = Taken By, (c) = Cosigned By    Initials Name Provider Type    MM Ashley Simpson, PT Physical Therapist        Therapy Charges for Today     Code Description Service Date Service Provider Modifiers Qty    40116719241 HC PT THER PROC EA 15 MIN 10/19/2019 Ashley Simpson, PT GP 1          PT G-Codes  Outcome Measure Options: AM-PAC 6 Clicks Basic Mobility (PT)  AM-PAC 6 Clicks Score (PT): 21  AM-PAC 6 Clicks Score (OT): 19  Modified [place] Scale: 4 - Moderately severe disability.  Unable to walk without assistance, and unable to attend to own bodily needs without assistance.    Ashley Simpson, PT  10/19/2019

## 2019-10-20 LAB
ANION GAP SERPL CALCULATED.3IONS-SCNC: 7.5 MMOL/L (ref 5–15)
BASOPHILS # BLD AUTO: 0.02 10*3/MM3 (ref 0–0.2)
BASOPHILS NFR BLD AUTO: 0.5 % (ref 0–1.5)
BUN BLD-MCNC: 5 MG/DL (ref 6–20)
BUN/CREAT SERPL: 10 (ref 7–25)
CALCIUM SPEC-SCNC: 8 MG/DL (ref 8.6–10.5)
CHLORIDE SERPL-SCNC: 95 MMOL/L (ref 98–107)
CO2 SERPL-SCNC: 29.5 MMOL/L (ref 22–29)
CREAT BLD-MCNC: 0.5 MG/DL (ref 0.57–1)
DEPRECATED RDW RBC AUTO: 38.3 FL (ref 37–54)
EOSINOPHIL # BLD AUTO: 0 10*3/MM3 (ref 0–0.4)
EOSINOPHIL NFR BLD AUTO: 0 % (ref 0.3–6.2)
ERYTHROCYTE [DISTWIDTH] IN BLOOD BY AUTOMATED COUNT: 12.9 % (ref 12.3–15.4)
GFR SERPL CREATININE-BSD FRML MDRD: 143 ML/MIN/1.73
GLUCOSE BLD-MCNC: 91 MG/DL (ref 65–99)
HCT VFR BLD AUTO: 29.8 % (ref 34–46.6)
HGB BLD-MCNC: 9.7 G/DL (ref 12–15.9)
IMM GRANULOCYTES # BLD AUTO: 0.01 10*3/MM3 (ref 0–0.05)
IMM GRANULOCYTES NFR BLD AUTO: 0.3 % (ref 0–0.5)
LYMPHOCYTES # BLD AUTO: 1.09 10*3/MM3 (ref 0.7–3.1)
LYMPHOCYTES NFR BLD AUTO: 28.5 % (ref 19.6–45.3)
MCH RBC QN AUTO: 26.6 PG (ref 26.6–33)
MCHC RBC AUTO-ENTMCNC: 32.6 G/DL (ref 31.5–35.7)
MCV RBC AUTO: 81.9 FL (ref 79–97)
MONOCYTES # BLD AUTO: 0.44 10*3/MM3 (ref 0.1–0.9)
MONOCYTES NFR BLD AUTO: 11.5 % (ref 5–12)
NEUTROPHILS # BLD AUTO: 2.26 10*3/MM3 (ref 1.7–7)
NEUTROPHILS NFR BLD AUTO: 59.2 % (ref 42.7–76)
NRBC BLD AUTO-RTO: 0 /100 WBC (ref 0–0.2)
PLATELET # BLD AUTO: 166 10*3/MM3 (ref 140–450)
PMV BLD AUTO: 9 FL (ref 6–12)
POTASSIUM BLD-SCNC: 3.5 MMOL/L (ref 3.5–5.2)
RBC # BLD AUTO: 3.64 10*6/MM3 (ref 3.77–5.28)
SODIUM BLD-SCNC: 132 MMOL/L (ref 136–145)
WBC NRBC COR # BLD: 3.82 10*3/MM3 (ref 3.4–10.8)

## 2019-10-20 PROCEDURE — 99233 SBSQ HOSP IP/OBS HIGH 50: CPT | Performed by: NURSE PRACTITIONER

## 2019-10-20 PROCEDURE — 80048 BASIC METABOLIC PNL TOTAL CA: CPT | Performed by: NEUROLOGICAL SURGERY

## 2019-10-20 PROCEDURE — 85025 COMPLETE CBC W/AUTO DIFF WBC: CPT | Performed by: NEUROLOGICAL SURGERY

## 2019-10-20 PROCEDURE — 99024 POSTOP FOLLOW-UP VISIT: CPT | Performed by: NEUROLOGICAL SURGERY

## 2019-10-20 PROCEDURE — 97110 THERAPEUTIC EXERCISES: CPT

## 2019-10-20 RX ORDER — DOCUSATE SODIUM 100 MG/1
100 CAPSULE, LIQUID FILLED ORAL 2 TIMES DAILY
Status: DISCONTINUED | OUTPATIENT
Start: 2019-10-20 | End: 2019-10-21 | Stop reason: HOSPADM

## 2019-10-20 RX ORDER — BISACODYL 10 MG
10 SUPPOSITORY, RECTAL RECTAL DAILY PRN
Status: DISCONTINUED | OUTPATIENT
Start: 2019-10-20 | End: 2019-10-21 | Stop reason: HOSPADM

## 2019-10-20 RX ADMIN — LEVOTHYROXINE SODIUM 75 MCG: 75 TABLET ORAL at 06:37

## 2019-10-20 RX ADMIN — CLONAZEPAM 1 MG: 1 TABLET ORAL at 15:45

## 2019-10-20 RX ADMIN — OXYCODONE HYDROCHLORIDE AND ACETAMINOPHEN 1 TABLET: 10; 325 TABLET ORAL at 17:08

## 2019-10-20 RX ADMIN — LEVETIRACETAM 500 MG: 500 TABLET, FILM COATED ORAL at 21:16

## 2019-10-20 RX ADMIN — DOCUSATE SODIUM 100 MG: 100 CAPSULE, LIQUID FILLED ORAL at 21:16

## 2019-10-20 RX ADMIN — OXCARBAZEPINE 750 MG: 300 TABLET, FILM COATED ORAL at 21:16

## 2019-10-20 RX ADMIN — VENLAFAXINE HYDROCHLORIDE 37.5 MG: 37.5 CAPSULE, EXTENDED RELEASE ORAL at 21:16

## 2019-10-20 RX ADMIN — OXYCODONE HYDROCHLORIDE AND ACETAMINOPHEN 2 TABLET: 10; 325 TABLET ORAL at 11:39

## 2019-10-20 RX ADMIN — OXYCODONE HYDROCHLORIDE AND ACETAMINOPHEN 1 TABLET: 10; 325 TABLET ORAL at 00:11

## 2019-10-20 RX ADMIN — LEVETIRACETAM 500 MG: 500 TABLET, FILM COATED ORAL at 10:04

## 2019-10-20 RX ADMIN — OXYCODONE HYDROCHLORIDE AND ACETAMINOPHEN 2 TABLET: 10; 325 TABLET ORAL at 21:16

## 2019-10-20 RX ADMIN — OXCARBAZEPINE 750 MG: 300 TABLET, FILM COATED ORAL at 10:04

## 2019-10-20 RX ADMIN — OXYCODONE HYDROCHLORIDE AND ACETAMINOPHEN 1 TABLET: 10; 325 TABLET ORAL at 06:36

## 2019-10-20 NOTE — PROGRESS NOTES
DOS: 10/20/2019  NAME: Desiree Peterson   : 1985  PCP: Guy Kelly MD    No chief complaint on file.  CC: seizures     Subjective: Pt seen in follow up, however the problem is new to me.  Doing well little bit sleepy sitting in the bed speaking with her family.  States she had a seizure last night and this morning where she had a staring off episode.  She has had a total of 4 since surgery on 10/16.  She would like to go home tomorrow if she feels okay.  Denies any new weakness, numbness, speech or visual disturbances, or headaches.  States sometimes she has trouble with word finding and a lot of the time she has to go back and read her text messages because she does not really remember what she said.    Objective:  Vital signs:   Vitals:    10/19/19 2109 10/20/19 0017 10/20/19 0516 10/20/19 0936   BP: 103/63 107/60 105/54 106/60   BP Location: Left arm Left arm Left arm Left arm   Patient Position: Lying Lying Lying Lying   Pulse: 66 68 63 71   Resp: 16   18   Temp: 98.9 °F (37.2 °C)  98.6 °F (37 °C) 98.9 °F (37.2 °C)   TempSrc: Oral  Oral Oral   SpO2: 97% 99% 93% 98%   Weight:       Height:           Current Facility-Administered Medications:   •  clonazePAM (KlonoPIN) tablet 1 mg, 1 mg, Oral, TID PRN, Nas Coats MD, 1 mg at 10/18/19 2044  •  levETIRAcetam (KEPPRA) tablet 500 mg, 500 mg, Oral, BID, Nas Coats MD, 500 mg at 10/20/19 1004  •  levothyroxine (SYNTHROID, LEVOTHROID) tablet 75 mcg, 75 mcg, Oral, Q AM, Nas Coats MD, 75 mcg at 10/20/19 0637  •  [DISCONTINUED] ondansetron (ZOFRAN) tablet 4 mg, 4 mg, Oral, Q6H PRN **OR** ondansetron (ZOFRAN) injection 4 mg, 4 mg, Intravenous, Q6H PRN, Nas Coats MD  •  ondansetron (ZOFRAN) tablet 4 mg, 4 mg, Oral, Q8H PRN, Nas Caots MD  •  ondansetron ODT (ZOFRAN-ODT) disintegrating tablet 4 mg, 4 mg, Oral, Q8H PRN, Nas Coats MD  •  OXcarbazepine (TRILEPTAL) tablet 750 mg, 750 mg, Oral, Q12H, Lyssa Kelley  MD, 750 mg at 10/20/19 1004  •  oxyCODONE-acetaminophen (PERCOCET)  MG per tablet 1 tablet, 1 tablet, Oral, Q4H PRN, Nas Coats MD, 1 tablet at 10/20/19 0636  •  oxyCODONE-acetaminophen (PERCOCET)  MG per tablet 2 tablet, 2 tablet, Oral, Q4H PRN, Nas Coats MD, 2 tablet at 10/20/19 1139  •  promethazine (PHENERGAN) injection 12.5 mg, 12.5 mg, Intravenous, Q6H PRN, Jodi Corona PA-C  •  venlafaxine XR (EFFEXOR-XR) 24 hr capsule 37.5 mg, 37.5 mg, Oral, Nightly, Nas Coats MD, 37.5 mg at 10/19/19 2020    PRN meds  •  clonazePAM  •  [DISCONTINUED] ondansetron **OR** ondansetron  •  ondansetron  •  ondansetron ODT  •  oxyCODONE-acetaminophen  •  oxyCODONE-acetaminophen  •  promethazine    No current facility-administered medications on file prior to encounter.      Current Outpatient Medications on File Prior to Encounter   Medication Sig   • Aspirin-Acetaminophen-Caffeine (EXCEDRIN MIGRAINE PO) Take 1 tablet by mouth As Needed (headache).   • levothyroxine (SYNTHROID, LEVOTHROID) 75 MCG tablet Take 1 tablet by mouth Daily.   • OXcarbazepine (TRILEPTAL) 300 MG tablet Take 600 mg by mouth 2 (Two) Times a Day.   • amphetamine-dextroamphetamine (ADDERALL) 10 MG tablet Take 30 mg by mouth 2 (Two) Times a Day.   • ibuprofen (ADVIL,MOTRIN) 200 MG tablet Take 400 mg by mouth Every 6 (Six) Hours As Needed for Mild Pain .   • ondansetron ODT (ZOFRAN-ODT) 4 MG disintegrating tablet Take 1 tablet by mouth Every 8 (Eight) Hours As Needed for Nausea or Vomiting.       General appearance: NAD, alert and cooperative, well groomed  HEENT: Normocephalic, atraumatic, PERRL, no masses or tenderness  COR: RRR  Resp: Even and unlabored  Extremities: Equal pulses  Skin: warm, dry    Neurological:   MS: oriented x3, recent/remote memory intact, normal attention/concentration, language intact, no neglect, normal fund of knowledge  CN: visual acuity grossly normal, visual fields full, PERRL, EOMI, facial  sensation decreased on right, mild right facial droop, hearing decreased on right, palate elevates symmetrically, shoulder shrug equal, tongue midline  Motor: 5/5 in all 4 ext., normal tone  Reflexes: 1+ in all 4 ext.  Sensory: light touch sensation intact in all 4 ext., decreased on right cheek  Coordination: Normal finger to nose test  Gait and station: no ataxia  Rapid alternating movements: normal finger to thumb tap    Laboratory results:  Lab Results   Component Value Date    TSH 1.130 06/24/2019     No results found for: [id number]  No results found for: HGBA1C  Lab Results   Component Value Date    GLUCOSE 91 10/20/2019    BUN 5 (L) 10/20/2019    CREATININE 0.50 (L) 10/20/2019    EGFRIFNONA 143 10/20/2019    BCR 10.0 10/20/2019    K 3.5 10/20/2019    CO2 29.5 (H) 10/20/2019    CALCIUM 8.0 (L) 10/20/2019    ALBUMIN 4.20 10/15/2019    LABIL2 1.3 06/07/2019    AST 8 10/15/2019    ALT 10 10/15/2019     Lab Results   Component Value Date    WBC 3.82 10/20/2019    HGB 9.7 (L) 10/20/2019    HCT 29.8 (L) 10/20/2019    MCV 81.9 10/20/2019     10/20/2019     Brief Urine Lab Results  (Last result in the past 365 days)      Color   Clarity   Blood   Leuk Est   Nitrite   Protein   CREAT   Urine HCG        10/15/19 0143 Dark Yellow Cloudy Trace Small (1+) Negative 30 mg/dL (1+)                                     Pain Management Panel     Pain Management Panel Latest Ref Rng & Units 8/28/2019 8/2/2018    BARBITURATES SCREEN Negative Negative Negative    BENZODIAZEPINE SCREEN, URINE Negative Negative Negative    COCAINE SCREEN, URINE Negative Negative Negative    METHADONE SCREEN, URINE Negative Negative Negative          Review and interpretation of imaging:  Ct Head Without Contrast    Result Date: 10/17/2019  Interval postsurgical changes as discussed. No immediate complication is evident.      This report was finalized on 10/17/2019 11:04 PM by Samuel Robins M.D.      Ct Head Without Contrast    Result Date:  10/15/2019  No acute findings.  Radiation dose reduction techniques were utilized, including automated exposure control and exposure modulation based on body size.  This report was finalized on 10/15/2019 3:03 AM by Dr. Bianka Fitzgerald M.D.      Ct Head With Contrast    Result Date: 10/16/2019  1.4 x 0.8 x 1.4 cm meningioma lateral to the right frontal lobe.      Radiation dose reduction techniques were utilized, including automated exposure control and exposure modulation based on body size.  This report was finalized on 10/16/2019 5:56 PM by Dr. Larry Alexander M.D.      Mri Brain With & Without Contrast    Result Date: 10/18/2019  The patient is status post resection of a small meningioma lateral to the right frontal lobe. Postoperative changes are noted as described above. There is artifact related to the craniotomy flap but with no convincing evidence of residual meningioma. See above.  This report was finalized on 10/18/2019 4:41 PM by Dr. Larry Alexander M.D.           Impression/Assessment:  This is a 33-year-old female with past medical history of atrial flutter, anxiety depression, migraines, seizures who presented to the hospital on 10/16/2019 for a resection of a right temporal meningioma.  We were asked to see the patient due to her complex partial seizures.  She reportedly had a seizure on 10/18 after her procedure that was described as the patient had altered mental status, twitching, and decreased responsiveness.  She was on Keppra 500 mg twice daily and Trileptal 600 mg twice daily.    1.  Complex partial seizures secondary to right temporal meningioma status post resection    Plan:  Currently on oxcarbazepine 750 mg twice daily, this was increased yesterday 10/19.  Given she did have partial seizure this morning I will keep her Keppra dose the same and plan on continuing with current oxcarbazepine through discharge.  She has a neurologist that she was seeing in [place] for POTS until he  referred her to see Dr. Coats due to finding out that her tumor had grown.  I will refer her to see Dr. Lenka Chaudhari in our office in 1 to 2 weeks post discharge for seizure follow-up.  I did explain this to the patient and her family, she did voice understanding. Will follow 1 more day, if she is stable tomorrow okay to discharge from our standpoint.  Seizure precautions.  Seizure instructions as stated below. Therapies as written. Call RRT for any acute neurological changes. We will continue to follow and advise.    SEIZURE INSTRUCTIONS:     Recommended to observe all seizure precautions, including, but not limited to no driving until seizure free for more than 3 months- as per State driving regulation / law;   Avoid all high-risk activity,   Take showers instead of baths,   Avoid swimming without observation,   Avoid open heat sources,   Avoid working at heights and   Avoid engaging in all potentially hazardous activities.   Patient expressed clear understanding    Case discussed with patient, family, and Dr. Kelley, and she agrees with plan above.   MYAH Villalobos

## 2019-10-20 NOTE — PLAN OF CARE
Problem: Patient Care Overview  Goal: Plan of Care Review   10/20/19 4492   Coping/Psychosocial   Plan of Care Reviewed With patient   OTHER   Outcome Summary Pt walked 400', climbed 10 stairs with CGA and did not have complaints throughout. Anticipates d/c home tomorrow.

## 2019-10-20 NOTE — THERAPY TREATMENT NOTE
Patient Name: Desiree Peterson  : 1985    MRN: [id number]                              Today's Date: 10/20/2019       Admit Date: 10/16/2019    Visit Dx:     ICD-10-CM ICD-9-CM   1. Meningioma (CMS/HCC) D32.9 225.2     Patient Active Problem List   Diagnosis   • Meningioma (CMS/HCC)   • Paresthesia   • Atypical migraine   • Hypokalemia   • Migraine aura without headache   • Cervical radiculopathy   • Cerebral meningioma (CMS/HCC)     Past Medical History:   Diagnosis Date   • Abnormal menses    • Anxiety and depression    • Atrial flutter (CMS/HCC)    • Bartholin cyst     X2   • Brain tumor (CMS/HCC)    • Cervical pain (neck)    • Cyst, sinus nasal    • Dumping syndrome    • Heart murmur    • History of anemia    • Meningioma (CMS/HCC)    • Mid back pain    • Migraines    • POTS (postural orthostatic tachycardia syndrome)    • Seizures (CMS/HCC)    • SOB (shortness of breath)    • Syncope and collapse    • Thyroid nodule    • Tinnitus of both ears      Past Surgical History:   Procedure Laterality Date   • CRANIOTOMY FOR TUMOR Right 10/16/2019    Procedure: Right frontotemporal craniotomy for tumor;  Surgeon: Nas Coats MD;  Location: [facility];  Service: Neurosurgery   • FINGER SURGERY      Cyst removal     General Information     Row Name 10/20/19 1347          PT Evaluation Time/Intention    Document Type  therapy note (daily note)  -CS     Mode of Treatment  physical therapy  -CS     Row Name 10/20/19 1349          General Information    Patient Profile Reviewed?  yes  -CS     Existing Precautions/Restrictions  fall  -CS     Row Name 10/20/19 7166          Cognitive Assessment/Intervention- PT/OT    Orientation Status (Cognition)  oriented x 4  -CS       User Key  (r) = Recorded By, (t) = Taken By, (c) = Cosigned By    Initials Name Provider Type    CS Juan J De La Paz, PT Physical Therapist        Mobility     Row Name 10/20/19 5663          Bed Mobility Assessment/Treatment    Bed  Mobility Assessment/Treatment  supine-sit;sit-supine  -CS     [place] Level (Bed Mobility)  independent  -CS     Supine-Sit [place] (Bed Mobility)  independent  -CS     Row Name 10/20/19 1347          Sit-Stand Transfer    Sit-Stand [place] (Transfers)  supervision  -CS     Row Name 10/20/19 1347          Gait/Stairs Assessment/Training    Gait/Stairs Assessment/Training  gait/ambulation independence  -CS     [place] Level (Gait)  contact guard  -CS     Distance in Feet (Gait)  400  -CS     Deviations/Abnormal Patterns (Gait)  nathanael decreased  -CS     Bilateral Gait Deviations  heel strike decreased  -CS     [place] Level (Stairs)  contact guard  -CS     Assistive Device (Stairs)  other (see comments)  -CS     Number of Steps (Stairs)  10  -CS     Ascending Technique (Stairs)  step-over-step  -CS       User Key  (r) = Recorded By, (t) = Taken By, (c) = Cosigned By    Initials Name Provider Type    CS Brain, Juan J HUGHES, PT Physical Therapist        Obj/Interventions    No documentation.       Goals/Plan    No documentation.       Clinical Impression     Row Name 10/20/19 4063          Pain Assessment    Additional Documentation  Pain Scale: FACES Pre/Post-Treatment (Group)  -CS     Row Name 10/20/19 1389          Pain Scale: Numbers Pre/Post-Treatment    Pain Location  head  -CS     Pain Intervention(s)  Repositioned;Ambulation/increased activity  -CS     Row Name 10/20/19 8661          Pain Scale: FACES Pre/Post-Treatment    Pain: FACES Scale, Pretreatment  6-->hurts even more  -CS     Pain: FACES Scale, Post-Treatment  6-->hurts even more  -CS     Row Name 10/20/19 5287          Plan of Care Review    Plan of Care Reviewed With  patient  -CS     Row Name 10/20/19 1346          Positioning and Restraints    Pre-Treatment Position  in bed  -CS     Post Treatment Position  bed  -CS     In Bed  supine;call light within reach;encouraged to call for assist;with family/caregiver  -CS       User  Key  (r) = Recorded By, (t) = Taken By, (c) = Cosigned By    Initials Name Provider Type    Juan J Alvarado, PT Physical Therapist        Outcome Measures     Row Name 10/20/19 1442          How much help from another person do you currently need...    Turning from your back to your side while in flat bed without using bedrails?  4  -CS     Moving from lying on back to sitting on the side of a flat bed without bedrails?  4  -CS     Moving to and from a bed to a chair (including a wheelchair)?  3  -CS     Standing up from a chair using your arms (e.g., wheelchair, bedside chair)?  4  -CS     Climbing 3-5 steps with a railing?  3  -CS     To walk in hospital room?  3  -CS     AM-PAC 6 Clicks Score (PT)  21  -CS     Row Name 10/20/19 1442          Functional Assessment    Outcome Measure Options  AM-PAC 6 Clicks Basic Mobility (PT)  -CS       User Key  (r) = Recorded By, (t) = Taken By, (c) = Cosigned By    Initials Name Provider Type    Juan J Alvarado, PT Physical Therapist        Physical Therapy Education     Title: PT OT SLP Therapies (In Progress)     Topic: Physical Therapy (Done)     Point: Mobility training (Done)     Learning Progress Summary           Patient Acceptance, E,TB, VU,NR by CS at 10/20/2019  1:50 PM    Acceptance, E, VU,DU by MM at 10/19/2019  5:02 PM    Acceptance, E, VU,NR by MS at 10/18/2019  4:16 PM    Acceptance, E, VU by AP at 10/17/2019 11:50 AM                   Point: Home exercise program (Done)     Learning Progress Summary           Patient Acceptance, E,TB, VU,NR by CS at 10/20/2019  1:50 PM                   Point: Body mechanics (Done)     Learning Progress Summary           Patient Acceptance, E,TB, VU,NR by CS at 10/20/2019  1:50 PM    Acceptance, E, VU,NR by MS at 10/18/2019  4:16 PM    Acceptance, E, VU by AP at 10/17/2019 11:50 AM                   Point: Precautions (Done)     Learning Progress Summary           Patient Acceptance, E,TB, VU,NR by  at 10/20/2019   1:50 PM    Acceptance, E, VU,NR by MS at 10/18/2019  4:16 PM    Acceptance, E, VU by AP at 10/17/2019 11:50 AM                               User Key     Initials Effective Dates Name Provider Type Discipline    MM 04/03/18 -  Ashley Simpson, PT Physical Therapist PT    MS 03/04/19 -  May Marin, PT Physical Therapist PT    CS 05/14/18 -  Juan J De La Paz, PT Physical Therapist PT    AP 09/04/19 -  Melissa Mortensen, PT Student PT Student PT              PT Recommendation and Plan     Outcome Summary/Treatment Plan (PT)  Anticipated Discharge Disposition (PT): home with assist  Plan of Care Reviewed With: patient  Outcome Summary: Pt walked 400', climbed 10 stairs with CGA and did not have complaints throughout. Anticipates d/c home tomorrow.      Time Calculation:   PT Charges     Row Name 10/20/19 1443             Time Calculation    Start Time  1337  -CS      Stop Time  1346  -CS      Time Calculation (min)  9 min  -CS      PT Received On  10/20/19  -CS      PT - Next Appointment  10/21/19  -CS        User Key  (r) = Recorded By, (t) = Taken By, (c) = Cosigned By    Initials Name Provider Type    CS Juan J De La Paz, PT Physical Therapist        Therapy Charges for Today     Code Description Service Date Service Provider Modifiers Qty    10392669426 HC PT THER PROC EA 15 MIN 10/20/2019 Juan J De La Paz, PT GP 1          PT G-Codes  Outcome Measure Options: AM-PAC 6 Clicks Basic Mobility (PT)  AM-PAC 6 Clicks Score (PT): 21  AM-PAC 6 Clicks Score (OT): 19  Modified [place] Scale: 4 - Moderately severe disability.  Unable to walk without assistance, and unable to attend to own bodily needs without assistance.    Juan J De La Paz, PT  10/20/2019

## 2019-10-20 NOTE — PLAN OF CARE
Problem: Patient Care Overview  Goal: Plan of Care Review  Outcome: Ongoing (interventions implemented as appropriate)   10/20/19 4253   OTHER   Outcome Summary Pt appeared to sleep fair, reported waking up often, pain managed with oral meds, up to br to void, reported having difficulty starting urine flow since f/c removed, pt reported having 1 absent seizure with staring off , diaphoresis, and flushing.    Plan of Care Review   Progress improving       Problem: Pain, Chronic (Adult)  Goal: Acceptable Pain/Comfort Level and Functional Ability  Outcome: Ongoing (interventions implemented as appropriate)      Problem: Fall Risk (Adult)  Goal: Absence of Fall  Outcome: Ongoing (interventions implemented as appropriate)      Problem: Skin Injury Risk (Adult)  Goal: Skin Health and Integrity  Outcome: Ongoing (interventions implemented as appropriate)

## 2019-10-20 NOTE — PROGRESS NOTES
LOS: 4 days   Patient Care Team:  Guy Kelly MD as PCP - General (Family Medicine)    Chief Complaint: Postop craniotomy    Subjective     This patient continues to do fairly well but still has a lot of pain particularly in the right temporalis region.     Interval History:     History taken from: patient chart    Objective      Her incision looks great with no evidence of infection.    Vital Signs  Temp:  [98 °F (36.7 °C)-98.9 °F (37.2 °C)] 98.9 °F (37.2 °C)  Heart Rate:  [60-71] 71  Resp:  [16-18] 18  BP: ()/(54-64) 106/60       Results Review:     I reviewed the patient's new clinical results.  She is afebrile with a white count of 3.8.      Assessment/Plan       Meningioma (CMS/HCC)      I told the patient that she absolutely has to stay off the Dilaudid today.  We have switched her to Percocet.  If she does okay today and then we can discharge her tomorrow with Percocet for 4 or 5 days weaning down to hydrocodone and ultimately off narcotics in about 10 days.      Nas Coats MD  10/20/19  11:23 AM

## 2019-10-21 ENCOUNTER — TELEPHONE (OUTPATIENT)
Dept: FAMILY MEDICINE CLINIC | Facility: CLINIC | Age: 34
End: 2019-10-21

## 2019-10-21 ENCOUNTER — TELEPHONE (OUTPATIENT)
Dept: NEUROSURGERY | Facility: CLINIC | Age: 34
End: 2019-10-21

## 2019-10-21 VITALS
SYSTOLIC BLOOD PRESSURE: 118 MMHG | OXYGEN SATURATION: 97 % | WEIGHT: 190.26 LBS | HEIGHT: 67 IN | DIASTOLIC BLOOD PRESSURE: 80 MMHG | HEART RATE: 69 BPM | TEMPERATURE: 98.2 F | RESPIRATION RATE: 18 BRPM | BODY MASS INDEX: 29.86 KG/M2

## 2019-10-21 PROBLEM — E87.6 HYPOKALEMIA: Status: RESOLVED | Noted: 2018-08-02 | Resolved: 2019-10-21

## 2019-10-21 PROBLEM — G43.109 MIGRAINE WITH AURA, NOT INTRACTABLE, WITHOUT STATUS MIGRAINOSUS: Status: ACTIVE | Noted: 2017-01-24

## 2019-10-21 PROBLEM — D32.9 MENINGIOMA: Status: ACTIVE | Noted: 2017-01-21

## 2019-10-21 LAB
ANION GAP SERPL CALCULATED.3IONS-SCNC: 6.4 MMOL/L (ref 5–15)
BUN BLD-MCNC: 4 MG/DL (ref 6–20)
BUN/CREAT SERPL: 8.3 (ref 7–25)
CALCIUM SPEC-SCNC: 8.4 MG/DL (ref 8.6–10.5)
CHLORIDE SERPL-SCNC: 93 MMOL/L (ref 98–107)
CO2 SERPL-SCNC: 29.6 MMOL/L (ref 22–29)
CREAT BLD-MCNC: 0.48 MG/DL (ref 0.57–1)
DEPRECATED RDW RBC AUTO: 39.2 FL (ref 37–54)
ERYTHROCYTE [DISTWIDTH] IN BLOOD BY AUTOMATED COUNT: 12.9 % (ref 12.3–15.4)
GFR SERPL CREATININE-BSD FRML MDRD: 150 ML/MIN/1.73
GLUCOSE BLD-MCNC: 96 MG/DL (ref 65–99)
HCT VFR BLD AUTO: 31.6 % (ref 34–46.6)
HGB BLD-MCNC: 10.3 G/DL (ref 12–15.9)
LAB AP CASE REPORT: NORMAL
LAB AP DIAGNOSIS COMMENT: NORMAL
LAB AP INTRADEPARTMENTAL CONSULT: NORMAL
LAB AP SYNOPTIC CHECKLIST: NORMAL
LYMPHOCYTES # BLD MANUAL: 1.32 10*3/MM3 (ref 0.7–3.1)
LYMPHOCYTES NFR BLD MANUAL: 43 % (ref 19.6–45.3)
LYMPHOCYTES NFR BLD MANUAL: 7 % (ref 5–12)
MCH RBC QN AUTO: 27.1 PG (ref 26.6–33)
MCHC RBC AUTO-ENTMCNC: 32.6 G/DL (ref 31.5–35.7)
MCV RBC AUTO: 83.2 FL (ref 79–97)
MONOCYTES # BLD AUTO: 0.22 10*3/MM3 (ref 0.1–0.9)
NEUTROPHILS # BLD AUTO: 1.54 10*3/MM3 (ref 1.7–7)
NEUTROPHILS NFR BLD MANUAL: 50 % (ref 42.7–76)
PATH REPORT.FINAL DX SPEC: NORMAL
PATH REPORT.GROSS SPEC: NORMAL
PLAT MORPH BLD: NORMAL
PLATELET # BLD AUTO: 157 10*3/MM3 (ref 140–450)
PMV BLD AUTO: 9.2 FL (ref 6–12)
POTASSIUM BLD-SCNC: 4.1 MMOL/L (ref 3.5–5.2)
RBC # BLD AUTO: 3.8 10*6/MM3 (ref 3.77–5.28)
RBC MORPH BLD: NORMAL
SODIUM BLD-SCNC: 129 MMOL/L (ref 136–145)
WBC MORPH BLD: NORMAL
WBC NRBC COR # BLD: 3.08 10*3/MM3 (ref 3.4–10.8)

## 2019-10-21 PROCEDURE — 85025 COMPLETE CBC W/AUTO DIFF WBC: CPT | Performed by: NEUROLOGICAL SURGERY

## 2019-10-21 PROCEDURE — 80048 BASIC METABOLIC PNL TOTAL CA: CPT | Performed by: NEUROLOGICAL SURGERY

## 2019-10-21 PROCEDURE — 85007 BL SMEAR W/DIFF WBC COUNT: CPT | Performed by: NEUROLOGICAL SURGERY

## 2019-10-21 RX ORDER — DEXTROAMPHETAMINE SACCHARATE, AMPHETAMINE ASPARTATE, DEXTROAMPHETAMINE SULFATE AND AMPHETAMINE SULFATE 2.5; 2.5; 2.5; 2.5 MG/1; MG/1; MG/1; MG/1
30 TABLET ORAL 2 TIMES DAILY
Qty: 60 TABLET | Refills: 0 | Status: SHIPPED | OUTPATIENT
Start: 2019-10-21 | End: 2020-12-08

## 2019-10-21 RX ORDER — ONDANSETRON 4 MG/1
4 TABLET, FILM COATED ORAL EVERY 8 HOURS PRN
Qty: 30 TABLET | Refills: 1 | Status: SHIPPED | OUTPATIENT
Start: 2019-10-21 | End: 2020-09-28 | Stop reason: ALTCHOICE

## 2019-10-21 RX ORDER — OXYCODONE HYDROCHLORIDE AND ACETAMINOPHEN 5; 325 MG/1; MG/1
1 TABLET ORAL EVERY 4 HOURS PRN
Qty: 25 TABLET | Refills: 0 | Status: SHIPPED | OUTPATIENT
Start: 2019-10-21 | End: 2019-11-04 | Stop reason: HOSPADM

## 2019-10-21 RX ORDER — OXCARBAZEPINE 150 MG/1
750 TABLET, FILM COATED ORAL EVERY 12 HOURS SCHEDULED
Qty: 300 TABLET | Refills: 0 | Status: SHIPPED | OUTPATIENT
Start: 2019-10-21 | End: 2019-11-04 | Stop reason: DRUGHIGH

## 2019-10-21 RX ORDER — CLONAZEPAM 1 MG/1
1 TABLET ORAL 3 TIMES DAILY PRN
Qty: 60 TABLET | Refills: 1 | Status: SHIPPED | OUTPATIENT
Start: 2019-10-21 | End: 2020-05-11 | Stop reason: SDUPTHER

## 2019-10-21 RX ORDER — LEVOTHYROXINE SODIUM 0.07 MG/1
75 TABLET ORAL DAILY
Qty: 30 TABLET | Refills: 3 | Status: SHIPPED | OUTPATIENT
Start: 2019-10-21 | End: 2020-11-04

## 2019-10-21 RX ORDER — LEVETIRACETAM 500 MG/1
500 TABLET ORAL 2 TIMES DAILY
Qty: 60 TABLET | Refills: 2 | Status: SHIPPED | OUTPATIENT
Start: 2019-10-21 | End: 2019-10-24 | Stop reason: SDUPTHER

## 2019-10-21 RX ORDER — OXYCODONE HYDROCHLORIDE AND ACETAMINOPHEN 5; 325 MG/1; MG/1
1 TABLET ORAL EVERY 4 HOURS PRN
Qty: 25 TABLET | Refills: 0
Start: 2019-10-21 | End: 2019-10-21 | Stop reason: HOSPADM

## 2019-10-21 RX ADMIN — OXYCODONE HYDROCHLORIDE AND ACETAMINOPHEN 2 TABLET: 10; 325 TABLET ORAL at 02:22

## 2019-10-21 RX ADMIN — OXYCODONE HYDROCHLORIDE AND ACETAMINOPHEN 2 TABLET: 10; 325 TABLET ORAL at 15:58

## 2019-10-21 RX ADMIN — LEVOTHYROXINE SODIUM 75 MCG: 75 TABLET ORAL at 07:29

## 2019-10-21 RX ADMIN — DOCUSATE SODIUM 100 MG: 100 CAPSULE, LIQUID FILLED ORAL at 08:43

## 2019-10-21 RX ADMIN — OXYCODONE HYDROCHLORIDE AND ACETAMINOPHEN 2 TABLET: 10; 325 TABLET ORAL at 08:43

## 2019-10-21 RX ADMIN — OXCARBAZEPINE 750 MG: 300 TABLET, FILM COATED ORAL at 08:43

## 2019-10-21 RX ADMIN — LEVETIRACETAM 500 MG: 500 TABLET, FILM COATED ORAL at 08:43

## 2019-10-21 NOTE — DISCHARGE SUMMARY
Desiree ERNANDEZ Nicholas  1985    Patient Care Team:  Guy Kelly MD as PCP - General (Family Medicine)    Date of Admit: 10/16/2019    Date of Discharge:  10/21/2019    Discharge Diagnosis:  Meningioma (CMS/HCC)                                          Seizure disorder                                          Chronic headaches exacerbated by recent surgery    Procedures Performed  Procedure(s):  Right frontotemporal craniotomy for tumor       Complications: none    Consultants:   Consults     Date and Time Order Name Status Description    10/18/2019 2034 Inpatient Neurology Consult General Completed     10/16/2019 2009 Inpatient Consult to Intensivist Completed           Condition on Discharge: stable    Discharge disposition: home    Pertinent Test Results:     Brief HPI: This is a 33-year-old white female with a history of seizure disorder and chronic headaches/migraines both of which have been managed by her primary care physician Dr. Guy Kelly.  She began seeing Dr. Coats in March 2017 for a right lateral sphenoid wing meningioma.  At that time the decision was made to follow it periodically.  He was last seen in the office in mid September of this year by Dr. Coats.  A previous MRI of the brain revealed expansion of the tumor.  The decision was made at that time for craniotomy for removal of the suspected meningioma.  The patient presented to the emergency room on the day of admission with complaints of nausea and vomiting as well as persistent headache.  There was also some report of a seizure and therefore the patient was admitted and subsequently underwent craniotomy the same day.  Please see admission H&P for further details.      Hospital Course: The patient was observed in the ICU overnight and cleared for discharge to a regular room but remained in the ICU for a few days due to bed availability.  She had a postoperative MRI brain w/wo contrast that revealed expected scalp edema as well as some  postoperative edema.  There was no evidence of infarct or residual meningioma.  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  The patient has some right sided headache due to the right temporal incision.  The edges of the incision are well approximated.  There is no redness, swelling, or drainage.  The patient has remained afebrile and her vital signs have remained stable.  He was evaluated by neurology for seizure that occurred on the evening of October 18.  Her seizures usually involve some episodes of staring off but according to the consult there was some additional twitching noted.  The patient states that she continues to have seizures on an occasional basis and per the neurology report she reported at least one complex partial seizure a day.  She had been on Oxycarbazepine at home. Oxycarbazepine level was therapeutic on 10/15/19. She apparently had a neurologist in [facility]. The hospital consulting neurologist increased the Trileptal from 600 mg twice daily to 750 mg twice daily.  The Keppra was continued at 500 mg twice daily.  The patient has had no further tonic-clonic seizures although she did have a brief episode of staring off into space very early this morning.  The patient states that this is common for her.  She states that she is feeling fine.  She is still having headaches in the right temporal region.  These have been managed well with Percocet.  Dr. Coats was on call this weekend and spoke to the patient over the weekend about the serious need for reducing her narcotic medications.  The Percocet 10/325 mg was reduced to 5/325 mg today.  She ambulated with physical therapy a day or so ago.  She was walking 400feet and able to ascend and descend the stairs without difficulty.  She was cleared from physical therapy standpoint to discharge home at any time without any assistive devices or physical therapy outpatient.  On my evaluation today, the patient is  awake, alert, oriented x3.  The right temporal incision is healing well.  She is neurologically intact.  No weakness on either side of the body.  She is able to ambulate to and from the bathroom.  She is voiding without difficulty.  She had some questions about all of her prescribed medications including her home medicines.  She was asking that we prescribe all of her medications since she ran out while she was out of insurance.  I explained that we would only refill the seizure medications and the pain medication.  She will need to get in touch with her primary care provider, Dr. Kelly regarding refills of her other medicines.  She will also need an evaluation with Dr. Kelly for lab work and reevaluation of mild hyponatremia within 1 week.  He is aware that she will be receiving a very limited supply of the Percocet 5/3 and 25 mg.  This will last her approximately 4 days.  After that she will need to come to Dr. Coats's office for a prescription for a hydrocodone.  For continued reduction in narcotic pain medication use.  Patient will be seen in the office as previously arranged for her postop visit on November 4, 2019.  She verbalized understanding.  She will be discharged home today.  Both patient Dr. Coats are in agreement with that plan.      Temp:  [97.8 °F (36.6 °C)-98.6 °F (37 °C)] 98.2 °F (36.8 °C)  Heart Rate:  [60-76] 69  Resp:  [16-19] 18  BP: ()/(52-80) 118/80    Current labs:  Lab Results (last 24 hours)     Procedure Component Value Units Date/Time    CBC & Differential [[id number]] Collected:  10/21/19 0447    Specimen:  Blood Updated:  10/21/19 0604    Narrative:       The following orders were created for panel order CBC & Differential.  Procedure                               Abnormality         Status                     ---------                               -----------         ------                     CBC Auto Differential[[id number]]        Abnormal            Final result                  Please view results for these tests on the individual orders.    Basic Metabolic Panel [[id number]]  (Abnormal) Collected:  10/21/19 0447    Specimen:  Blood Updated:  10/21/19 0556     Glucose 96 mg/dL      BUN 4 mg/dL      Creatinine 0.48 mg/dL      Sodium 129 mmol/L      Potassium 4.1 mmol/L      Chloride 93 mmol/L      CO2 29.6 mmol/L      Calcium 8.4 mg/dL      eGFR Non African Amer 150 mL/min/1.73      BUN/Creatinine Ratio 8.3     Anion Gap 6.4 mmol/L     Narrative:       GFR Normal >60  Chronic Kidney Disease <60  Kidney Failure <15    CBC Auto Differential [[id number]]  (Abnormal) Collected:  10/21/19 0447    Specimen:  Blood Updated:  10/21/19 0604     WBC 3.08 10*3/mm3      RBC 3.80 10*6/mm3      Hemoglobin 10.3 g/dL      Hematocrit 31.6 %      MCV 83.2 fL      MCH 27.1 pg      MCHC 32.6 g/dL      RDW 12.9 %      RDW-SD 39.2 fl      MPV 9.2 fL      Platelets 157 10*3/mm3     Manual Differential [[id number]]  (Abnormal) Collected:  10/21/19 0447    Specimen:  Blood Updated:  10/21/19 0604     Neutrophil % 50.0 %      Lymphocyte % 43.0 %      Monocyte % 7.0 %      Neutrophils Absolute 1.54 10*3/mm3      Lymphocytes Absolute 1.32 10*3/mm3      Monocytes Absolute 0.22 10*3/mm3      RBC Morphology Normal     WBC Morphology Normal     Platelet Morphology Normal            Discharge Medications  Berny has been reviewed and narcotic consent is on file in the patient's chart.     Your medication list      START taking these medications      Instructions Last Dose Given Next Dose Due   oxyCODONE-acetaminophen 5-325 MG per tablet  Commonly known as:  PERCOCET      Take 1 tablet by mouth Every 4 (Four) Hours As Needed for Moderate Pain .          CHANGE how you take these medications      Instructions Last Dose Given Next Dose Due   OXcarbazepine 150 MG tablet  Commonly known as:  TRILEPTAL  What changed:    · medication strength  · how much to take  · when to take this      Take 5 tablets by mouth Every 12 (Twelve)  Hours.          CONTINUE taking these medications      Instructions Last Dose Given Next Dose Due   amphetamine-dextroamphetamine 10 MG tablet  Commonly known as:  ADDERALL      Take 3 tablets by mouth 2 (Two) Times a Day.       clonazePAM 1 MG tablet  Commonly known as:  KlonoPIN      Take 1 tablet by mouth 3 (Three) Times a Day As Needed for Anxiety or Seizures.       levETIRAcetam 500 MG tablet  Commonly known as:  KEPPRA      Take 1 tablet by mouth 2 (Two) Times a Day.       levothyroxine 75 MCG tablet  Commonly known as:  SYNTHROID, LEVOTHROID      Take 1 tablet by mouth Daily.       ondansetron 4 MG tablet  Commonly known as:  ZOFRAN      Take 1 tablet by mouth Every 8 (Eight) Hours As Needed for Nausea or Vomiting.       ondansetron ODT 4 MG disintegrating tablet  Commonly known as:  ZOFRAN-ODT      Take 1 tablet by mouth Every 8 (Eight) Hours As Needed for Nausea or Vomiting.       venlafaxine XR 37.5 MG 24 hr capsule  Commonly known as:  EFFEXOR-XR      Take 37.5 mg by mouth Every Night.       ZANAFLEX 2 MG capsule  Generic drug:  TiZANidine      Take 2 mg by mouth 3 (Three) Times a Day As Needed for Muscle Spasms.          STOP taking these medications    EXCEDRIN MIGRAINE PO        ibuprofen 200 MG tablet  Commonly known as:  BOY HERRMANN              Where to Get Your Medications      These [facility] [place] [id number]    Hours:  7:00AM-6PM Mon-Fri Phone:  [phone]   · levETIRAcetam 500 MG tablet  · OXcarbazepine 150 MG tablet  · oxyCODONE-acetaminophen 5-325 MG per tablet     These medications were sent to [facility]/pharmacy #[id number] - [place], [place] - [address] AT IN THE [place] - [phone] [facility] [phone] FX  [address], [place] [id number]    Hours:  24-hours Phone:  [phone]   · amphetamine-dextroamphetamine 10 MG tablet  · clonazePAM 1 MG tablet  · levothyroxine 75 MCG tablet  · ondansetron 4 MG tablet    "      Discharge Diet:   Diet Instructions     Diet: Regular      Discharge Diet:  Regular        Diet Order   Procedures   • Diet Regular       Activity at Discharge:   Activity Instructions     Other Activity Instructions      Activity Instructions: as listed on attached post op instruction sheet. NO DRIVING.          Call for: questions or concerns    Follow-up Appointments  Future Appointments   Date Time Provider Department Center   11/4/2019 10:30 AM Jodi Corona PA-C [facility] None     Follow-up Information     Lenka Chaudhari MD Follow up in 4 week(s).    Specialties:  Neurology, Sleep Medicine  Contact information:  [address]  [address]  [place] [id number] [phone]             Guy Kelly MD Follow up.    Specialty:  Family Medicine  Why:  see PCP for management of all other medications.  Contact information:  [address] [facility] [id number] [phone]                 Additional Instructions for the Follow-ups that You Need to Schedule     Discharge Follow-up with PCP   As directed       Currently Documented PCP:    Guy Kelly MD    PCP Phone Number:    [phone]     Follow Up Details:  see PCP for management of all other medications.         Discharge Follow-up with Specialty: At previously arranged appointment 2 weeks from surgery.; 2 Weeks   As directed      Specialty:  At previously arranged appointment 2 weeks from surgery.    Follow Up:  2 Weeks               Test Results Pending at Discharge   Order Current Status    Tissue Pathology Exam In process         None    I discussed the discharge instructions with patient    Rasheeda LORA Jaswinder, MYAH  10/21/19  3:23 PM    \"Dictated utilizing Dragon dictation\".    "

## 2019-10-21 NOTE — PROGRESS NOTES
Continued Stay Note  [facility]     Patient Name: Desiree Peterson  MRN: [id number]  Today's Date: 10/21/2019    Admit Date: 10/16/2019    Discharge Plan     Row Name 10/21/19 1446       Plan    Plan  Home    Patient/Family in Agreement with Plan  yes    Plan Comments  Received a call from Mariah with [facility] stating patient walked 400ft and neuro and/or PCP will not order HH. Informed both patient and RN.    Row Name 10/21/19 1413       Plan    Plan  Home with [facility]    Patient/Family in Agreement with Plan  yes    Plan Comments  Spoke with Mylene with [facility] who stated they will accept.    Row Name 10/21/19 1324       Plan    Plan  Home with [facility] (referral made)    Patient/Family in Agreement with Plan  yes    Plan Comments  Patient requesting [facility]. Referral made and  left with [phone]        Discharge Codes    No documentation.       Expected Discharge Date and Time     Expected Discharge Date Expected Discharge Time    Oct 21, 2019             Jamia Caballero, RN

## 2019-10-21 NOTE — DISCHARGE PLACEMENT REQUEST
"Desiree Al (33 y.o. Female)     Date of Birth Social Security Number Address Home Phone MRN    1985  [address] [facility] [address] [phone] [id number]    Islam Marital Status          Buddhist Single       Admission Date Admission Type Admitting Provider Attending Provider Department, Room/Bed    10/16/19 Elective Nas Coats MD Reiss, Steven J., MD [address], P590/1    Discharge Date Discharge Disposition Discharge Destination         Home or Self Care              Attending Provider:  Nas Coast MD    Allergies:  Cefdinir, Penicillins    Isolation:  None   Infection:  None   Code Status:  CPR    Ht:  170.2 cm (67\")   Wt:  86.3 kg (190 lb 4.1 oz)    Admission Cmt:  None   Principal Problem:  Meningioma (CMS/[facility]) [D32.9]                 Active Insurance as of 10/16/2019     Primary Coverage     Payor Plan Insurance Group Employer/Plan Group    [facility] [employer] [facility] [employer] [facility] [id number]     Payor Plan Address Payor Plan Phone Number Payor Plan Fax Number Effective Dates    [address] [phone]  1/1/2019 - None Entered    [facility] [id number]       Subscriber Name Subscriber Birth Date Member ID       DESIREE AL 1985 [id number]                 Emergency Contacts      (Rel.) Home Phone Work Phone Mobile Phone    Sher Al (Brother) [phone] -- --    Karen Al (Power of ) -- -- [phone]              "

## 2019-10-21 NOTE — PLAN OF CARE
Problem: Patient Care Overview  Goal: Plan of Care Review  Outcome: Ongoing (interventions implemented as appropriate)   10/21/19 6869   Coping/Psychosocial   Plan of Care Reviewed With patient   OTHER   Outcome Summary Vitals as charted, c/o incisional pain that is relieved w/ PRN pain meds, requesting home health and walker, plan for discharge today.    Plan of Care Review   Progress improving

## 2019-10-21 NOTE — TELEPHONE ENCOUNTER
Per discussion with Rasheeda patient does not need home health, her PT note mentioned that she was able to ambulate 400 ft and she did stairs well today. [facility] is aware.

## 2019-10-21 NOTE — TELEPHONE ENCOUNTER
Alysia bran/ [facility]  [phone]   Pt is being discharged today. She wants to know if Rasheeda will put in some home health orders.  The hospital care manger says the patient is requesting  Home health

## 2019-10-21 NOTE — TELEPHONE ENCOUNTER
PATIENT WOULD LIKE TO GET A HANDICAP FORM FILLED OUT    CALL PATIENT WHEN DONE       REFILLS clonazePAM (KlonoPIN) 1 MG tablet  levothyroxine (SYNTHROID, LEVOTHROID) 75 MCG tablet  ondansetron ODT (ZOFRAN-ODT) 4 MG disintegrating tablet  venlafaxine XR (EFFEXOR-XR) 37.5 MG 24 hr capsule  amphetamine-dextroamphetamine (ADDERALL) 10 MG tablet

## 2019-10-21 NOTE — PLAN OF CARE
Problem: Patient Care Overview  Goal: Plan of Care Review  Outcome: Ongoing (interventions implemented as appropriate)   10/20/19 1442 10/20/19 2001   Coping/Psychosocial   Plan of Care Reviewed With patient --    OTHER   Outcome Summary --  C/o pain controlled with PO meds. Klonopin given once for 10min seizure, starring, facial, head, and right hand twitching. Amonia tab used, pt reported feeling tired after. VSS will monitor   Plan of Care Review   Progress --  no change     Goal: Individualization and Mutuality  Outcome: Ongoing (interventions implemented as appropriate)    Goal: Discharge Needs Assessment  Outcome: Ongoing (interventions implemented as appropriate)    Goal: Interprofessional Rounds/Family Conf  Outcome: Ongoing (interventions implemented as appropriate)      Problem: Pain, Chronic (Adult)  Goal: Identify Related Risk Factors and Signs and Symptoms  Outcome: Ongoing (interventions implemented as appropriate)    Goal: Acceptable Pain/Comfort Level and Functional Ability  Outcome: Ongoing (interventions implemented as appropriate)      Problem: Fall Risk (Adult)  Goal: Identify Related Risk Factors and Signs and Symptoms  Outcome: Ongoing (interventions implemented as appropriate)    Goal: Absence of Fall  Outcome: Ongoing (interventions implemented as appropriate)      Problem: Skin Injury Risk (Adult)  Goal: Identify Related Risk Factors and Signs and Symptoms  Outcome: Ongoing (interventions implemented as appropriate)    Goal: Skin Health and Integrity  Outcome: Ongoing (interventions implemented as appropriate)      Problem: Seizure Disorder/Epilepsy (Adult)  Goal: Signs and Symptoms of Listed Potential Problems Will be Absent, Minimized or Managed (Seizure Disorder/Epilepsy)  Outcome: Ongoing (interventions implemented as appropriate)

## 2019-10-21 NOTE — PLAN OF CARE
Problem: Patient Care Overview  Goal: Plan of Care Review  Outcome: Ongoing (interventions implemented as appropriate)   10/21/19 8927   Coping/Psychosocial   Plan of Care Reviewed With patient   OTHER   Outcome Summary VSS, am labs, pain better, had a 1-2 min seizure witnessed by family, MD notified   Plan of Care Review   Progress improving

## 2019-10-21 NOTE — PROGRESS NOTES
Continued Stay Note  [facility]     Patient Name: Desiree Peterson  MRN: [id number]  Today's Date: 10/21/2019    Admit Date: 10/16/2019    Discharge Plan     Row Name 10/21/19 1324       Plan    Plan  Home with [facility] (referral made)    Patient/Family in Agreement with Plan  yes    Plan Comments  Patient requesting [facility]. Referral made and  left with [phone]        Discharge Codes    No documentation.       Expected Discharge Date and Time     Expected Discharge Date Expected Discharge Time    Oct 21, 2019             Jamia Caballero RN

## 2019-10-21 NOTE — PAYOR COMM NOTE
"UR CONTACT:  LAURE  P: [phone]        F: [phone]  REF #[id number]        Desiree Al (33 y.o. Female)     Date of Birth Social Security Number Address Home Phone MRN    1985  [address] [place] [address] [phone] [id number]    [employer] Status          Mu-ism Single       Admission Date Admission Type Admitting Provider Attending Provider Department, Room/Bed    10/16/19 Elective Nas Coats MD Reiss, Steven J., MD [address], P590/1    Discharge Date Discharge Disposition Discharge Destination                       Attending Provider:  Nas Coats MD    Allergies:  Cefdinir, Penicillins    Isolation:  None   Infection:  None   Code Status:  CPR    Ht:  170.2 cm (67\")   Wt:  86.3 kg (190 lb 4.1 oz)    Admission Cmt:  None   Principal Problem:  Meningioma (CMS/[facility]) [D32.9]                 Active Insurance as of 10/16/2019     Primary Coverage     Payor Plan Insurance Group Employer/Plan Group    [employer] PPO [id number]     Payor Plan Address Payor Plan Phone Number Payor Plan Fax Number Effective Dates    [phone]  1/1/2019 - None Entered    [place] [address]       Subscriber Name Subscriber Birth Date Member ID       DESIREE AL 1985 [id number]                 Emergency Contacts      (Rel.) Home Phone Work Phone Mobile Phone    CoyneSher roldan (Brother) [phone] -- --    Karen Al (Power of ) -- -- [phone]          [facility] Medications (active)       Dose Frequency Start End    bisacodyl (DULCOLAX) suppository 10 mg 10 mg Daily PRN 10/20/2019     Sig - Route: Insert 1 suppository into the rectum Daily As Needed for Constipation. - Rectal    clonazePAM (KlonoPIN) tablet 1 mg 1 mg 3 Times Daily PRN 10/16/2019     Sig - Route: Take 1 tablet by mouth 3 (Three) Times a Day As Needed for Anxiety or Seizures. - Oral    " "docusate sodium (COLACE) capsule 100 mg 100 mg 2 Times Daily 10/20/2019     Sig - Route: Take 1 capsule by mouth 2 (Two) Times a Day. - Oral    levETIRAcetam (KEPPRA) tablet 500 mg 500 mg 2 Times Daily 10/16/2019     Sig - Route: Take 1 tablet by mouth 2 (Two) Times a Day. - Oral    levothyroxine (SYNTHROID, LEVOTHROID) tablet 75 mcg 75 mcg Every Early Morning 10/17/2019     Sig - Route: Take 1 tablet by mouth Every Morning. - Oral    ondansetron (ZOFRAN) injection 4 mg 4 mg Every 6 Hours PRN 10/16/2019     Sig - Route: Infuse 2 mL into a venous catheter Every 6 (Six) Hours As Needed for Nausea or Vomiting. - Intravenous    Linked Group 1:  \"Or\" Linked Group Details        ondansetron (ZOFRAN) tablet 4 mg 4 mg Every 8 Hours PRN 10/16/2019     Sig - Route: Take 1 tablet by mouth Every 8 (Eight) Hours As Needed for Nausea or Vomiting. - Oral    ondansetron ODT (ZOFRAN-ODT) disintegrating tablet 4 mg 4 mg Every 8 Hours PRN 10/16/2019     Sig - Route: Take 1 tablet by mouth Every 8 (Eight) Hours As Needed for Nausea or Vomiting. - Oral    OXcarbazepine (TRILEPTAL) tablet 750 mg 750 mg Every 12 Hours Scheduled 10/19/2019     Sig - Route: Take 750 mg by mouth Every 12 (Twelve) Hours. - Oral    oxyCODONE-acetaminophen (PERCOCET)  MG per tablet 1 tablet 1 tablet Every 4 Hours PRN 10/19/2019 10/29/2019    Sig - Route: Take 1 tablet by mouth Every 4 (Four) Hours As Needed for Moderate Pain . - Oral    oxyCODONE-acetaminophen (PERCOCET)  MG per tablet 2 tablet 2 tablet Every 4 Hours PRN 10/19/2019 10/29/2019    Sig - Route: Take 2 tablets by mouth Every 4 (Four) Hours As Needed for Severe Pain . - Oral    promethazine (PHENERGAN) injection 12.5 mg 12.5 mg Every 6 Hours PRN 10/18/2019     Sig - Route: Infuse 0.5 mL into a venous catheter Every 6 (Six) Hours As Needed for Nausea or Vomiting. - Intravenous    Cosign for Ordering: Accepted by Nas Coats MD on 10/18/2019 11:57 AM    venlafaxine XR (EFFEXOR-XR) 24 " hr capsule 37.5 mg 37.5 mg Nightly 10/16/2019     Sig - Route: Take 1 capsule by mouth Every Night. - Oral            Orders (active)     Start     Ordered    10/20/19 2100  docusate sodium (COLACE) capsule 100 mg  2 Times Daily      10/20/19 1931    10/20/19 1958  Inpatient Case Management  Consult  Once     Provider:  (Not yet assigned)    10/20/19 1958    10/20/19 1931  bisacodyl (DULCOLAX) suppository 10 mg  Daily PRN      10/20/19 1931    10/19/19 2100  OXcarbazepine (TRILEPTAL) tablet 750 mg  Every 12 Hours Scheduled      10/19/19 1226    10/19/19 1741  oxyCODONE-acetaminophen (PERCOCET)  MG per tablet 2 tablet  Every 4 Hours PRN      10/19/19 1742    10/19/19 1740  oxyCODONE-acetaminophen (PERCOCET)  MG per tablet 1 tablet  Every 4 Hours PRN      10/19/19 1742    10/18/19 1935  HYDROmorphone (DILAUDID) injection 0.5 mg  Every 2 Hours PRN      10/18/19 1938    10/18/19 1143  Diet Regular  Diet Effective Now      10/18/19 1142    10/18/19 1118  Advance Diet As Tolerated  Until Discontinued      10/18/19 1118    10/18/19 1117  promethazine (PHENERGAN) injection 12.5 mg  Every 6 Hours PRN      10/18/19 1118    10/17/19 1324  Code Status and Medical Interventions:  Continuous      10/17/19 1323    10/17/19 1324  Notify Provider if Bladder Distention Continues  Until Discontinued      10/17/19 1323    10/17/19 1324  Consult Pharmacist For Review of Medications That May Cause Urinary Retention - RN To Place Order for Consult it Needed  Continuous      10/17/19 1323    10/17/19 1045  PT Consult: Eval & Treat Post Surgery Care  Once     Comments:  Balance issues post surgery for tumor removal    10/17/19 1045    10/17/19 0942  OT Consult: Eval & Treat  Once      10/17/19 0941    10/17/19 0600  levothyroxine (SYNTHROID, LEVOTHROID) tablet 75 mcg  Every Early Morning      10/16/19 2110    10/16/19 2200  venlafaxine XR (EFFEXOR-XR) 24 hr capsule 37.5 mg  Nightly      10/16/19 2110    10/16/19  2200  levETIRAcetam (KEPPRA) tablet 500 mg  2 Times Daily      10/16/19 2110    10/16/19 2200  Incentive Spirometry  Every 2 Hours While Awake      10/16/19 2110    10/16/19 2111  Oxygen Therapy- Nasal Cannula; Titrate for SPO2: equal to or greater than, 92%, per policy  Continuous      10/16/19 2110    10/16/19 2111  Continuous Pulse Oximetry  Continuous      10/16/19 2110    10/16/19 2111  CBC & Differential  Daily      10/16/19 2110    10/16/19 2111  Basic Metabolic Panel  Daily      10/16/19 2110    10/16/19 2111  Maintain Sequential Compression Device  Continuous      10/16/19 2110    10/16/19 2110  ondansetron ODT (ZOFRAN-ODT) disintegrating tablet 4 mg  Every 8 Hours PRN      10/16/19 2110    10/16/19 2110  ondansetron (ZOFRAN) tablet 4 mg  Every 8 Hours PRN      10/16/19 2110    10/16/19 2110  clonazePAM (KlonoPIN) tablet 1 mg  3 Times Daily PRN      10/16/19 2110    10/16/19 2110  ondansetron (ZOFRAN) injection 4 mg  Every 6 Hours PRN      10/16/19 2110    Unscheduled  Bladder Scan if Patient Unable to Void 4-6 Hours After Catheter Removal  As Needed      10/17/19 1323    Unscheduled  If Bladder Scan Volume is Less Than 500mL & Patient is Without Symptoms of Bladder Discomfort / Distention Monitor Every 1-2 Hours for Spontaneous Void  As Needed      10/17/19 1323    Unscheduled  Straight Cath Every 4-6 Hours As Needed If Patient is Unable to Void After 4-6 Hours, Bladder Scan Volume is Greater Than 500mL & Patient Has Symptoms of Bladder Discomfort / Distention  As Needed      10/17/19 1323    Unscheduled  Schedule / Prompt Voiding For Patients With Urinary Incontinence  As Needed      10/17/19 1323             Operative/Procedure Notes       Nas Coats MD at 10/16/19 1551  Version 1 of 1         CRANIOTOMY FOR TUMOR STEREOTACTIC  Progress Note    Desiree Peterson  10/16/2019    Pre-op Diagnosis:   Meningioma (CMS/HCC) [D32.9]       Post-Op Diagnosis Codes:     * Meningioma (CMS/HCC)  [D32.9]    Procedure/CPT® Codes:      Procedure(s):  Right frontotemporal craniotomy for tumor    Surgeon(s):  Nas Coats MD    Anesthesia: General    Staff:   Circulator: Glenny Kelly, COSTA; Spurling, Shannon, COSTA; Dianna Klein, RN  Scrub Person: Susan Li; Lorena Lira  Assistant: Charisse Ortega CSA  Orientee: Melonie Paul    Estimated Blood Loss: minimal    Urine Voided: 350 mL    Specimens:                Specimens     ID Source Type Tests Collected By Collected At Frozen?      A Brain Tissue · TISSUE PATHOLOGY EXAM   Nas Coats MD 10/16/19 2645      Description: right temporal meningioma                Drains:   Closed/Suction Drain Right Other (Comment) Bulb 10 Fr. (Active)       Urethral Catheter Straight-tip;Double-lumen;Silicone 16 Fr. (Active)       Findings: meningioma    Complications: none      Nas Coats MD     Date: 10/16/2019  Time: 5:17 PM        Electronically signed by Nas Coats MD at 10/16/19 1717     Nas Coats MD at 10/16/19 1551  Version 1 of 1       Preoperative diagnosis: Right temporal meningioma    Postoperative diagnosis: Same    Procedure performed: Right frontotemporal craniotomy with gross total removal of a right temporal meningioma    Surgeon: Nas Coats M.D.    Asst.: Charisse Ortega CFA who was instrumental in helping with hemostasis, visualization of neural structures and retraction of neural structures    Estimated blood loss, crystalloid, colloid, blood: Please see anesthesia record    Material to lab:   The entire tumor was sent for permanent section in formalin.    Drains: One subgaleal drain    Complications: None    Indications for the procedure: This is a patient with a known meningioma that had doubled in size over the last 2 years.  While it was still quite small she is only 33 years old and we elected to proceed with removal while it was still fairly small.    Operative summary:  The patient was brought  into the operating room and placed under general endotracheal anesthesia using intravenous and inhalational agents.  The patient was then positioned on the operating table in the supine position.  All pressure points were padded including peripheral points of entrapment.  Her head was secured in the [place] headrest and turned to the left side.  She was then reference to the Stealth navigation system which was used to plan out the incision in the right frontotemporal region.  This area was shaved free of hair and then injected with 20 cc of 1/8% Marcaine with 1-200,000 epinephrine solution.  She was injected after a prep with ChloraPrep which was allowed to dry for 3 minutes.  She was then draped with towels, half sheets cranial drape and an Ioban.  An incision was then made in the above location.  This was carried down to the temporalis fascia.  The fascia was opened sharply and then the muscle was divided with electrocautery and stripped off the outer table of the skull.  We were able to localize the tumor under the bone and minimize the craniotomy defect.  A bur hole was placed and then connected around the perimeter using the B1 bit on the [email or web address] drill.  Following this the bone flap was elevated and set aside.  The middle meningeal artery was bleeding and was coagulated with bipolar cautery following which the dura was opened sharply.  The remainder of the operation was done under the high-power magnification the operating microscope using microsurgical technique microsurgical instrumentation.  I was able to cut around the tumor itself which easil  came off the cortical surface of the brain although there were a couple of small veins that were attached to the tumor.  These were coagulated and divided.  I then coated the open cortex with FloSeal and brought the systolic blood pressure to 163.  There was no evidence of any further bleeding in the open area of dura was overlaid with DuraGen and then sealed with  Adherus.  The bone flap was then put back into place using the cranial fix system and then the muscle, fascia galea and skin layers were closed in succession after placing a drain in the subgaleal space.  Once this was done the incision was dressed and the patient was taken to the recovery room in stable condition.  There were no apparent complications and the sponge, instrument and needle counts were correct at the end of the procedure.    Electronically signed by Nas Coats MD at 10/16/19 4244          Physician Progress Notes      Grace Aguero APRN at 10/20/19 1238          DOS: 10/20/2019  NAME: Desiree Peterson   : 1985  PCP: Guy Kelly MD    No chief complaint on file.  CC: seizures     Subjective: Pt seen in follow up, however the problem is new to me.  Doing well little bit sleepy sitting in the bed speaking with her family.  States she had a seizure last night and this morning where she had a staring off episode.  She has had a total of 4 since surgery on 10/16.  She would like to go home tomorrow if she feels okay.  Denies any new weakness, numbness, speech or visual disturbances, or headaches.  States sometimes she has trouble with word finding and a lot of the time she has to go back and read her text messages because she does not really remember what she said.    Objective:  Vital signs:   Vitals:    10/19/19 2109 10/20/19 0017 10/20/19 0516 10/20/19 0936   BP: 103/63 107/60 105/54 106/60   BP Location: Left arm Left arm Left arm Left arm   Patient Position: Lying Lying Lying Lying   Pulse: 66 68 63 71   Resp: 16   18   Temp: 98.9 °F (37.2 °C)  98.6 °F (37 °C) 98.9 °F (37.2 °C)   TempSrc: Oral  Oral Oral   SpO2: 97% 99% 93% 98%   Weight:       Height:           Current Facility-Administered Medications:   •  clonazePAM (KlonoPIN) tablet 1 mg, 1 mg, Oral, TID PRN, Nas Coats MD, 1 mg at 10/18/19 2044  •  levETIRAcetam (KEPPRA) tablet 500 mg, 500 mg, Oral, BID, Pauly  Nas DONALDSON MD, 500 mg at 10/20/19 1004  •  levothyroxine (SYNTHROID, LEVOTHROID) tablet 75 mcg, 75 mcg, Oral, Q AM, Nas Coats MD, 75 mcg at 10/20/19 0637  •  [DISCONTINUED] ondansetron (ZOFRAN) tablet 4 mg, 4 mg, Oral, Q6H PRN **OR** ondansetron (ZOFRAN) injection 4 mg, 4 mg, Intravenous, Q6H PRN, Nas Coats MD  •  ondansetron (ZOFRAN) tablet 4 mg, 4 mg, Oral, Q8H PRN, Nas Coats MD  •  ondansetron ODT (ZOFRAN-ODT) disintegrating tablet 4 mg, 4 mg, Oral, Q8H PRN, Nas Coats MD  •  OXcarbazepine (TRILEPTAL) tablet 750 mg, 750 mg, Oral, Q12H, Lyssa Kelley MD, 750 mg at 10/20/19 1004  •  oxyCODONE-acetaminophen (PERCOCET)  MG per tablet 1 tablet, 1 tablet, Oral, Q4H PRN, Nas Coats MD, 1 tablet at 10/20/19 0636  •  oxyCODONE-acetaminophen (PERCOCET)  MG per tablet 2 tablet, 2 tablet, Oral, Q4H PRN, Nas Coats MD, 2 tablet at 10/20/19 1139  •  promethazine (PHENERGAN) injection 12.5 mg, 12.5 mg, Intravenous, Q6H PRN, Jodi Corona PA-C  •  venlafaxine XR (EFFEXOR-XR) 24 hr capsule 37.5 mg, 37.5 mg, Oral, Nightly, Nas Coats MD, 37.5 mg at 10/19/19 2020    PRN meds  •  clonazePAM  •  [DISCONTINUED] ondansetron **OR** ondansetron  •  ondansetron  •  ondansetron ODT  •  oxyCODONE-acetaminophen  •  oxyCODONE-acetaminophen  •  promethazine    No current facility-administered medications on file prior to encounter.      Current Outpatient Medications on File Prior to Encounter   Medication Sig   • Aspirin-Acetaminophen-Caffeine (EXCEDRIN MIGRAINE PO) Take 1 tablet by mouth As Needed (headache).   • levothyroxine (SYNTHROID, LEVOTHROID) 75 MCG tablet Take 1 tablet by mouth Daily.   • OXcarbazepine (TRILEPTAL) 300 MG tablet Take 600 mg by mouth 2 (Two) Times a Day.   • amphetamine-dextroamphetamine (ADDERALL) 10 MG tablet Take 30 mg by mouth 2 (Two) Times a Day.   • ibuprofen (ADVIL,MOTRIN) 200 MG tablet Take 400 mg by mouth Every 6 (Six) Hours As Needed  for Mild Pain .   • ondansetron ODT (ZOFRAN-ODT) 4 MG disintegrating tablet Take 1 tablet by mouth Every 8 (Eight) Hours As Needed for Nausea or Vomiting.       General appearance: NAD, alert and cooperative, well groomed  HEENT: Normocephalic, atraumatic, PERRL, no masses or tenderness  COR: RRR  Resp: Even and unlabored  Extremities: Equal pulses  Skin: warm, dry    Neurological:   MS: oriented x3, recent/remote memory intact, normal attention/concentration, language intact, no neglect, normal fund of knowledge  CN: visual acuity grossly normal, visual fields full, PERRL, EOMI, facial sensation decreased on right, mild right facial droop, hearing decreased on right, palate elevates symmetrically, shoulder shrug equal, tongue midline  Motor: 5/5 in all 4 ext., normal tone  Reflexes: 1+ in all 4 ext.  Sensory: light touch sensation intact in all 4 ext., decreased on right cheek  Coordination: Normal finger to nose test  Gait and station: no ataxia  Rapid alternating movements: normal finger to thumb tap    Laboratory results:  Lab Results   Component Value Date    TSH 1.130 06/24/2019     No results found for: RMDVFYOB84  No results found for: HGBA1C  Lab Results   Component Value Date    GLUCOSE 91 10/20/2019    BUN 5 (L) 10/20/2019    CREATININE 0.50 (L) 10/20/2019    EGFRIFNONA 143 10/20/2019    BCR 10.0 10/20/2019    K 3.5 10/20/2019    CO2 29.5 (H) 10/20/2019    CALCIUM 8.0 (L) 10/20/2019    ALBUMIN 4.20 10/15/2019    LABIL2 1.3 06/07/2019    AST 8 10/15/2019    ALT 10 10/15/2019     Lab Results   Component Value Date    WBC 3.82 10/20/2019    HGB 9.7 (L) 10/20/2019    HCT 29.8 (L) 10/20/2019    MCV 81.9 10/20/2019     10/20/2019     Brief Urine Lab Results  (Last result in the past 365 days)      Color   Clarity   Blood   Leuk Est   Nitrite   Protein   CREAT   Urine HCG        10/15/19 0143 Dark Yellow Cloudy Trace Small (1+) Negative 30 mg/dL (1+)                   Pain Management Panel     Pain  Management Panel Latest Ref Rng & Units 8/28/2019 8/2/2018    BARBITURATES SCREEN Negative Negative Negative    BENZODIAZEPINE SCREEN, URINE Negative Negative Negative    COCAINE SCREEN, URINE Negative Negative Negative    METHADONE SCREEN, URINE Negative Negative Negative          Review and interpretation of imaging:  Ct Head Without Contrast    Result Date: 10/17/2019  Interval postsurgical changes as discussed. No immediate complication is evident.      This report was finalized on 10/17/2019 11:04 PM by Samuel Robins M.D.      Ct Head Without Contrast    Result Date: 10/15/2019  No acute findings.  Radiation dose reduction techniques were utilized, including automated exposure control and exposure modulation based on body size.  This report was finalized on 10/15/2019 3:03 AM by Dr. Bianka Fitzgerald M.D.      Ct Head With Contrast    Result Date: 10/16/2019  1.4 x 0.8 x 1.4 cm meningioma lateral to the right frontal lobe.      Radiation dose reduction techniques were utilized, including automated exposure control and exposure modulation based on body size.  This report was finalized on 10/16/2019 5:56 PM by Dr. Larry Alexander M.D.      Mri Brain With & Without Contrast    Result Date: 10/18/2019  The patient is status post resection of a small meningioma lateral to the right frontal lobe. Postoperative changes are noted as described above. There is artifact related to the craniotomy flap but with no convincing evidence of residual meningioma. See above.  This report was finalized on 10/18/2019 4:41 PM by Dr. Larry Alexander M.D.           Impression/Assessment:  This is a 33-year-old female with past medical history of atrial flutter, anxiety depression, migraines, seizures who presented to the hospital on 10/16/2019 for a resection of a right temporal meningioma.  We were asked to see the patient due to her complex partial seizures.  She reportedly had a seizure on 10/18 after her procedure that was described  as the patient had altered mental status, twitching, and decreased responsiveness.  She was on Keppra 500 mg twice daily and Trileptal 600 mg twice daily.    1.  Complex partial seizures secondary to right temporal meningioma status post resection    Plan:  Currently on oxcarbazepine 750 mg twice daily, this was increased yesterday 10/19.  Given she did have partial seizure this morning I will keep her Keppra dose the same and plan on continuing with current oxcarbazepine through discharge.  She has a neurologist that she was seeing in [place] for POTS until he referred her to see Dr. Coats due to finding out that her tumor had grown.  I will refer her to see Dr. Lenka Chaudhari in our office in 1 to 2 weeks post discharge for seizure follow-up.  I did explain this to the patient and her family, she did voice understanding. Will follow 1 more day, if she is stable tomorrow okay to discharge from our standpoint.  Seizure precautions.  Seizure instructions as stated below. Therapies as written. Call RRT for any acute neurological changes. We will continue to follow and advise.    SEIZURE INSTRUCTIONS:     Recommended to observe all seizure precautions, including, but not limited to no driving until seizure free for more than 3 months- as per State driving regulation / law;   Avoid all high-risk activity,   Take showers instead of baths,   Avoid swimming without observation,   Avoid open heat sources,   Avoid working at heights and   Avoid engaging in all potentially hazardous activities.   Patient expressed clear understanding    Case discussed with patient, family, and Dr. Kelley, and she agrees with plan above.   MYAH Villalobos        Electronically signed by Grace Aguero APRN at 10/20/19 1307     Nas Coats MD at 10/20/19 1123             LOS: 4 days   Patient Care Team:  Guy Kelly MD as PCP - General (Family Medicine)    Chief Complaint: Postop craniotomy    Subjective     This patient  continues to do fairly well but still has a lot of pain particularly in the right temporalis region.     Interval History:     History taken from: patient chart    Objective      Her incision looks great with no evidence of infection.    Vital Signs  Temp:  [98 °F (36.7 °C)-98.9 °F (37.2 °C)] 98.9 °F (37.2 °C)  Heart Rate:  [60-71] 71  Resp:  [16-18] 18  BP: ()/(54-64) 106/60       Results Review:     I reviewed the patient's new clinical results.  She is afebrile with a white count of 3.8.      Assessment/Plan       Meningioma (CMS/HCC)      I told the patient that she absolutely has to stay off the Dilaudid today.  We have switched her to Percocet.  If she does okay today and then we can discharge her tomorrow with Percocet for 4 or 5 days weaning down to hydrocodone and ultimately off narcotics in about 10 days.      Nas Coats MD  10/20/19  11:23 AM          Electronically signed by Nas Coats MD at 10/20/19 1125     Nas Coats MD at 10/19/19 1333             LOS: 3 days   Patient Care Team:  Guy Kelly MD as PCP - General (Family Medicine)    Chief Complaint: Postop tumor removal    Subjective     This patient says she feels pretty good.  She has a pretty severe headache as expected but no other complaints.  Her eye swelling has improved.    Interval History:     History taken from: patient chart family    Objective      She has good movement in all 4 extremities and seems to be communicating without difficulty.  Her incision looks good.    Vital Signs  Temp:  [97.7 °F (36.5 °C)-99.1 °F (37.3 °C)] 97.7 °F (36.5 °C)  Heart Rate:  [58-87] 58  Resp:  [15-17] 17  BP: ()/(59-64) 97/61       Results Review:     I reviewed the patient's new clinical results.  She is afebrile with a white count of 5.6.      Assessment/Plan       Meningioma (CMS/HCC)      I told the patient we need to stop the Dilaudid at this time.  She needs to maintain her pain medications on the Norco.  Over the  "next several days I would like her to reduce her use of narcotic medications as well.  We will check another CBC in the morning and if that looks okay she can probably go home tomorrow if she feels like it.  I also discussed the pathology report with the patient and her mother.      Nas Coats MD  10/19/19  1:34 PM          Electronically signed by Nas Coats MD at 10/19/19 1335     Luisito Linda MD at 10/19/19 0710        Patient out of ICU. Will sign off. Please call as needed.    Electronically signed by Luisito Linda MD at 10/19/19 0710     Jodi Corona PA-C at 10/18/19 1116     Attestation signed by Nas Coats MD at 10/18/19 1405    I have reviewed the documentation above and agree.                    Doing ok. Has some HA, nausea.  Zofran not helping with nausea much.     CT yesterday ok. Brain MRI completed this am, results pending.     Blood pressure 99/64, pulse 66, temperature 98.8 °F (37.1 °C), temperature source Oral, resp. rate 18, height 170.2 cm (67\"), weight 86.3 kg (190 lb 4.1 oz), last menstrual period 10/12/2019, SpO2 99 %.      AA, Ox3  Incision ok  SOTO well  Lungs effort normal      ..  Results from last 7 days   Lab Units 10/18/19  0519   WBC 10*3/mm3 5.23   HEMOGLOBIN g/dL 9.6*   HEMATOCRIT % 29.1*   PLATELETS 10*3/mm3 166       ..  Results from last 7 days   Lab Units 10/18/19  0519   SODIUM mmol/L 136   POTASSIUM mmol/L 3.5   CHLORIDE mmol/L 101   CO2 mmol/L 29.3*   BUN mg/dL 2*   CREATININE mg/dL 0.54*   GLUCOSE mg/dL 102*   CALCIUM mg/dL 7.9*       ..  Results from last 7 days   Lab Units 10/17/19  0523   INR  1.24*       POD#2 s/p R F-P crani for meningioma  Path pending  HA-cont with pain control  Nausea-add phenergan prn  Mobilize  Go to floor  Follow for MRI results  Likely home this weekend when Nausea, HA better, tolerating diet and up and walking well    Electronically signed by Nas Coats MD at 10/18/19 7935          Consult Notes      Lyssa Kelley " MD GLORIA at 10/19/19 1155      Consult Orders    1. Inpatient Neurology Consult General [[id number]] ordered by Nas Coats MD at 10/18/19 2034                Neurology Consult Note    Referring Provider: Dr. Coats  Reason for Consultation: Seizure disorder.    History of present illness:    The patient is a 33-year-old woman admitted on 10/16/2019 for resection of right temporal meningioma.  This was performed by craniotomy on 10/16/2019.  Patient was monitored in the intensive care unit for 48 hours and did well, transferred out yesterday.    Patient is on several seizure medications including Keppra 500 mg twice daily and Trileptal 600 mg twice daily.    Seizure history is obtained from the patient and her family at the bedside.    She has several types of seizures, followed by a neurologist in [place].  She has had several tonic-clonic seizures most recently 1 month ago.  Her most frequent seizure is complex partial type with altered mental status, twitching and decreased responsiveness.  This is the type of seizure she had last night.  This was the first seizure since surgery on 10/16.    She has been on oxcarbazepine 600 mg twice daily.  The complex partial type of seizure was increasing recently occurring several times per day.  This prompted her to come into the emergency room 1 week ago where Keppra was added to oxcarbazepine.  She continued to have at least one complex partial seizure per day after the addition of Keppra.      Past Medical History  Past Medical History:   Diagnosis Date   • Abnormal menses    • Anxiety and depression    • Atrial flutter (CMS/HCC)    • Bartholin cyst     X2   • Brain tumor (CMS/HCC)    • Cervical pain (neck)    • Cyst, sinus nasal    • Dumping syndrome    • Heart murmur    • History of anemia    • Meningioma (CMS/HCC)    • Mid back pain    • Migraines    • POTS (postural orthostatic tachycardia syndrome)    • Seizures (CMS/HCC)    • SOB (shortness of breath)    •  Syncope and collapse    • Thyroid nodule    • Tinnitus of both ears        Past Surgical History  Past Surgical History:   Procedure Laterality Date   • CRANIOTOMY FOR TUMOR Right 10/16/2019    Procedure: Right frontotemporal craniotomy for tumor;  Surgeon: Nas Coats MD;  Location: [facility];  Service: Neurosurgery   • FINGER SURGERY      Cyst removal       Family History  Family History   Problem Relation Age of Onset   • No Known Problems Mother    • No Known Problems Father    • Malig Hyperthermia Neg Hx        Allergies   Allergen Reactions   • Cefdinir Rash   • Penicillins Rash       Social History  Social History     Socioeconomic History   • Marital status: Single     Spouse name: Not on file   • Number of children: 1   • Years of education: College   • Highest education level: Not on file   Occupational History   • Occupation: Unemployed   Tobacco Use   • Smoking status: Former Smoker     Packs/day: 1.00     Years: 3.00     Pack years: 3.00     Types: Cigarettes     Start date:      Last attempt to quit: 10/15/2019     Years since quittin.0   • Smokeless tobacco: Never Used   Substance and Sexual Activity   • Alcohol use: No   • Drug use: No   • Sexual activity: Defer       Review of Systems   HENT: Positive for facial swelling.    Neurological: Positive for dizziness.   All other systems reviewed and are negative.      Medications  Scheduled Meds:  levETIRAcetam 500 mg Oral BID   levothyroxine 75 mcg Oral Q AM   OXcarbazepine 600 mg Oral Q12H   venlafaxine XR 37.5 mg Oral Nightly     Continuous Infusions:   PRN Meds:.•  clonazePAM  •  HYDROcodone-acetaminophen  •  HYDROcodone-acetaminophen  •  [DISCONTINUED] ondansetron **OR** ondansetron  •  ondansetron  •  ondansetron ODT  •  promethazine    Vital Signs   Temp:  [97.7 °F (36.5 °C)-99.1 °F (37.3 °C)] 97.7 °F (36.5 °C)  Heart Rate:  [58-87] 58  Resp:  [15-18] 17  BP: ()/(59-70) 97/61    Examination:  Constitutional: Well-groomed,  well-nourished  HENT: Right scalp with postsurgical changes  Eyes: Normal conjunctiva  CVS:  Regular rate and rhythm.  No murmurs.  Good peripheral perfusion.   Resp :   Non labored respirations  Musculoskeletal:  No signs of peripheral edema  Skin:  No rash, normal turgor  Neurologic:   Alert oriented and fluent   No dysarthria   Cranial nerves intact, no nystagmus   Pupils symmetric and equally reactive   Normal power (5/5) in all extremities proximally and distally   Normal coordination   Reflexes symmetric and not hyperactive   Plantar responses flexor   Sensory exam grossly intact   Gait not tested  Psychiatric: No anxiety, normal mood    Results Review:  Results from last 7 days   Lab Units 10/19/19  0501 10/18/19  0519 10/17/19  0523   WBC 10*3/mm3 5.58 5.23 4.69   HEMOGLOBIN g/dL 9.9* 9.6* 9.8*   HEMATOCRIT % 30.6* 29.1* 30.1*   PLATELETS 10*3/mm3 185 166 188        Results from last 7 days   Lab Units 10/19/19  0501 10/18/19  0519 10/17/19  0523  10/15/19  0038   SODIUM mmol/L 133* 136 135*   < > 136   POTASSIUM mmol/L 3.3* 3.5 3.8   < > 3.4*   CHLORIDE mmol/L 95* 101 100   < > 97*   CO2 mmol/L 27.0 29.3* 28.9   < > 29.1*   BUN mg/dL 3* 2* 3*   < > 7   CREATININE mg/dL 0.53* 0.54* 0.42*   < > 0.68   CALCIUM mg/dL 8.2* 7.9* 8.3*   < > 8.7   BILIRUBIN mg/dL  --   --   --   --  0.2   ALK PHOS U/L  --   --   --   --  66   ALT (SGPT) U/L  --   --   --   --  10   AST (SGOT) U/L  --   --   --   --  8   GLUCOSE mg/dL 94 102* 120*   < > 94    < > = values in this interval not displayed.      No results found for: SELEDXHR36  Lab Results   Component Value Date    TSH 1.130 06/24/2019     Oxcarbazepine level 10/13/2019 27       Brain MRI with postoperative changes images reviewed independently    Medical Decision Making and Recommendations  Complex partial seizures  New problem to patient and this examiner  Secondary to right temporal meningioma status post resection  Breakthrough complex partial seizure  overnight    Advised patient that she will not see complete resolution of complex partial seizures following surgery.  However this is the first event she has had since surgery 2 days ago so this is an encouraging trend.    Would prefer to have the patient on monotherapy with oxcarbazepine.  Keppra recently added by the emergency room and did not seem to improve seizure frequency.  Therefore will increase oxcarbazepine to 750 mg twice daily, giving an additional 150 mg now.    I discussed these findings and recommendations with patient and family.  All questions addressed.    Lyssa Kelley MD  10/19/19  11:56 AM    Electronically signed by Lyssa Kelley MD at 10/19/19 1233         Dominik Herndon RN   Registered Nurse   Emergency Medicine   Plan of Care   Signed   Date of Service:  10/21/19 0534   Creation Time:  10/21/19 0534            Signed           Problem: Patient Care Overview  Goal: Plan of Care Review  Outcome: Ongoing (interventions implemented as appropriate)    10/21/19 0534   Coping/Psychosocial   Plan of Care Reviewed With patient   OTHER   Outcome Summary VSS, am labs, pain better, had a 1-2 min seizure witnessed by family, MD notified                   Maximiliano Torres RN   Registered Nurse      Plan of Care   Signed   Date of Service:  10/20/19 2004   Creation Time:  10/20/19 2004            Signed           Problem: Patient Care Overview  Goal: Plan of Care Review  Outcome: Ongoing (interventions implemented as appropriate)    10/20/19 1442 10/20/19 2001   Coping/Psychosocial   Plan of Care Reviewed With patient --    OTHER   Outcome Summary --  C/o pain controlled with PO meds. Klonopin given once for 10min seizure, starring, facial, head, and right hand twitching. Amonia tab used, pt reported feeling tired after. VSS will monitor                     Shavonne Ingram RN   Registered Nurse   Ambulatory Surgery   Plan of Care   Signed   Date of Service:  10/20/19 0656   Creation Time:   10/20/19 0656            Signed           Problem: Patient Care Overview  Goal: Plan of Care Review  Outcome: Ongoing (interventions implemented as appropriate)    10/20/19 0648   OTHER   Outcome Summary Pt appeared to sleep fair, reported waking up often, pain managed with oral meds, up to br to void, reported having difficulty starting urine flow since f/c removed, pt reported having 1 absent seizure with staring off , diaphoresis, and flushing                       Maximiliano Torres RN   Registered Nurse      Plan of Care   Signed   Date of Service:  10/19/19 1631   Creation Time:  10/19/19 1631            Signed           Problem: Patient Care Overview  Goal: Plan of Care Review  Outcome: Ongoing (interventions implemented as appropriate)    10/19/19 0629 10/19/19 1623   Coping/Psychosocial   Plan of Care Reviewed With patient;family --    OTHER   Outcome Summary --  Neurology consulted with Pt. Increased Trileptal dose. C/o pain controlled with PO meds. No seizure activity for shift. VSS will monito                   Naheed Sahu, RN   Registered Nurse   Neurology   Plan of Care   Signed   Date of Service:  10/19/19 0636   Creation Time:  10/19/19 0636            Signed           Problem: Patient Care Overview  Goal: Plan of Care Review  Outcome: Ongoing (interventions implemented as appropriate)    10/19/19 0629   Coping/Psychosocial   Plan of Care Reviewed With patient;family   OTHER   Outcome Summary Pt had witnessed seizure lasting approxiametly three minutes. Patient was not responding to questions or stimuli. Patient eyes noted to roll back into her head. Neurology consulted. No more seizure activity tonight. C/o incisional pain. Given pain medications per orders. Right sided facial swelling has decreased w/ use of ice packs and patient reports some relief. Family remains at the bedside with her.                     May Davila RN      Case Management   Progress Notes   Signed    Date of Service:  10/17/19 1603   Creation Time:  10/17/19 1603            Signed             Discharge Planning Assessment  [facility]     Patient Name: Desiree Peterson                   MRN: [id number]  Today's Date: 10/17/2019                   Admit Date: 10/16/2019              Discharge Plan      Row Name 10/17/19 1556           Plan     Plan  Home  would like follow up if HH is needed     Plan Comments  CCP spoke to patient at bedside to discuss discharge planning.  CCP role explained.  Face sheet verified.  Pt emergency contact is her mother/POA Karen, [phone].   Pt lives in a house  with her dependent daughter   Her Aunt and mother are staying with her while she recuperates.  .    She is independent with ADL's. She uses no DME  to ambulate.  She has no history of Home Health.   She has not been to rehab.  Pt obtains her medications from [facility]  pharmacy on [address] [address] [facility].      Plan is Home She would like follow up for Home Health at Discharge   CCP following

## 2019-10-21 NOTE — PROGRESS NOTES
Continued Stay Note  [facility]     Patient Name: Desiree Peterson  MRN: [id number]  Today's Date: 10/21/2019    Admit Date: 10/16/2019    Discharge Plan     Row Name 10/21/19 1413       Plan    Plan  Home with [facility]    Patient/Family in Agreement with Plan  yes    Plan Comments  Spoke with Mylene with [facility] who stated they will accept.    Row Name 10/21/19 1324       Plan    Plan  Home with [facility] (referral made)    Patient/Family in Agreement with Plan  yes    Plan Comments  Patient requesting [facility]. Referral made and  left with [phone]        Discharge Codes    No documentation.       Expected Discharge Date and Time     Expected Discharge Date Expected Discharge Time    Oct 21, 2019             Jamia Caballero RN

## 2019-10-22 ENCOUNTER — READMISSION MANAGEMENT (OUTPATIENT)
Dept: CALL CENTER | Facility: HOSPITAL | Age: 34
End: 2019-10-22

## 2019-10-22 NOTE — PAYOR COMM NOTE
"UR CONTACT:  LAURE  P: [phone]        F: [phone]    DISCHARGED.  AWAITING DAYS APPROVED.          Desiree Al (33 y.o. Female)     Date of Birth Social Security Number Address Home Phone MRN    1985  [id number] [facility] [address] [phone] [id number]    Sikh Marital Status          Religion Single       Admission Date Admission Type Admitting Provider Attending Provider Department, Room/Bed    10/16/19 Elective Nas Coats MD  [address], P590/1    Discharge Date Discharge Disposition Discharge Destination        10/21/2019 Home or Self Care              Attending Provider:  (none)   Allergies:  Cefdinir, Penicillins    Isolation:  None   Infection:  None   Code Status:  Prior    Ht:  170.2 cm (67\")   Wt:  86.3 kg (190 lb 4.1 oz)    Admission Cmt:  None   Principal Problem:  Meningioma (CMS/HCC) [D32.9]                 Active Insurance as of 10/16/2019     Primary Coverage     Payor Plan Insurance Group Employer/Plan Group    [employer] PPO [id number]     Payor Plan Address Payor Plan Phone Number Payor Plan Fax Number Effective Dates    [address] [phone]  1/1/2019 - None Entered    [place] [address]       Subscriber Name Subscriber Birth Date Member ID       DESIREE AL 1985 [id number]                 Emergency Contacts      (Rel.) Home Phone Work Phone Mobile Phone    NicholasSher (Brother) [phone] -- --    Karen Al (Power of ) -- -- [phone]               Discharge Summary      Rasheeda San, APRN at 10/21/19 1521          Desiree Al  1985    Patient Care Team:  Guy Kelly MD as PCP - General (Family Medicine)    Date of Admit: 10/16/2019    Date of Discharge:  10/21/2019    Discharge Diagnosis:  Meningioma (CMS/HCC)                                          Seizure disorder                                        "   Chronic headaches exacerbated by recent surgery    Procedures Performed  Procedure(s):  Right frontotemporal craniotomy for tumor       Complications: none    Consultants:   Consults     Date and Time Order Name Status Description    10/18/2019 2034 Inpatient Neurology Consult General Completed     10/16/2019 2009 Inpatient Consult to Intensivist Completed           Condition on Discharge: stable    Discharge disposition: home    Pertinent Test Results:     Brief HPI: This is a 33-year-old white female with a history of seizure disorder and chronic headaches/migraines both of which have been managed by her primary care physician Dr. Guy Kelly.  She began seeing Dr. Coats in March 2017 for a right lateral sphenoid wing meningioma.  At that time the decision was made to follow it periodically.  He was last seen in the office in mid September of this year by Dr. Coats.  A previous MRI of the brain revealed expansion of the tumor.  The decision was made at that time for craniotomy for removal of the suspected meningioma.  The patient presented to the emergency room on the day of admission with complaints of nausea and vomiting as well as persistent headache.  There was also some report of a seizure and therefore the patient was admitted and subsequently underwent craniotomy the same day.  Please see admission H&P for further details.      Hospital Course: The patient was observed in the ICU overnight and cleared for discharge to a regular room but remained in the ICU for a few days due to bed availability.  She had a postoperative MRI brain w/wo contrast that revealed expected scalp edema as well as some postoperative edema.  There was no evidence of infarct or residual meningioma.  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  The patient has some right sided headache due to the right temporal incision.  The edges of the incision are well approximated.  There is no redness, swelling, or  drainage.  The patient has remained afebrile and her vital signs have remained stable.  He was evaluated by neurology for seizure that occurred on the evening of October 18.  Her seizures usually involve some episodes of staring off but according to the consult there was some additional twitching noted.  The patient states that she continues to have seizures on an occasional basis and per the neurology report she reported at least one complex partial seizure a day.  She had been on Oxycarbazepine at home. Oxycarbazepine level was therapeutic on 10/15/19. She apparently had a neurologist in [facility]. The hospital consulting neurologist increased the Trileptal from 600 mg twice daily to 750 mg twice daily.  The Keppra was continued at 500 mg twice daily.  The patient has had no further tonic-clonic seizures although she did have a brief episode of staring off into space very early this morning.  The patient states that this is common for her.  She states that she is feeling fine.  She is still having headaches in the right temporal region.  These have been managed well with Percocet.  Dr. Coats was on call this weekend and spoke to the patient over the weekend about the serious need for reducing her narcotic medications.  The Percocet 10/325 mg was reduced to 5/325 mg today.  She ambulated with physical therapy a day or so ago.  She was walking 400feet and able to ascend and descend the stairs without difficulty.  She was cleared from physical therapy standpoint to discharge home at any time without any assistive devices or physical therapy outpatient.  On my evaluation today, the patient is awake, alert, oriented x3.  The right temporal incision is healing well.  She is neurologically intact.  No weakness on either side of the body.  She is able to ambulate to and from the bathroom.  She is voiding without difficulty.  She had some questions about all of her prescribed medications including her home  medicines.  She was asking that we prescribe all of her medications since she ran out while she was out of insurance.  I explained that we would only refill the seizure medications and the pain medication.  She will need to get in touch with her primary care provider, Dr. Kelly regarding refills of her other medicines.  She will also need an evaluation with Dr. Kelly for lab work and reevaluation of mild hyponatremia within 1 week.  He is aware that she will be receiving a very limited supply of the Percocet 5/3 and 25 mg.  This will last her approximately 4 days.  After that she will need to come to Dr. Coats's office for a prescription for a hydrocodone.  For continued reduction in narcotic pain medication use.  Patient will be seen in the office as previously arranged for her postop visit on November 4, 2019.  She verbalized understanding.  She will be discharged home today.  Both patient Dr. Coats are in agreement with that plan.      Temp:  [97.8 °F (36.6 °C)-98.6 °F (37 °C)] 98.2 °F (36.8 °C)  Heart Rate:  [60-76] 69  Resp:  [16-19] 18  BP: ()/(52-80) 118/80    Current labs:  Lab Results (last 24 hours)     Procedure Component Value Units Date/Time    CBC & Differential [[id number]] Collected:  10/21/19 0447    Specimen:  Blood Updated:  10/21/19 0604    Narrative:       The following orders were created for panel order CBC & Differential.  Procedure                               Abnormality         Status                     ---------                               -----------         ------                     CBC Auto Differential[[id number]]        Abnormal            Final result                 Please view results for these tests on the individual orders.    Basic Metabolic Panel [[id number]]  (Abnormal) Collected:  10/21/19 0447    Specimen:  Blood Updated:  10/21/19 0556     Glucose 96 mg/dL      BUN 4 mg/dL      Creatinine 0.48 mg/dL      Sodium 129 mmol/L      Potassium 4.1 mmol/L      Chloride 93  mmol/L      CO2 29.6 mmol/L      Calcium 8.4 mg/dL      eGFR Non African Amer 150 mL/min/1.73      BUN/Creatinine Ratio 8.3     Anion Gap 6.4 mmol/L     Narrative:       GFR Normal >60  Chronic Kidney Disease <60  Kidney Failure <15    CBC Auto Differential [[id number]]  (Abnormal) Collected:  10/21/19 0447    Specimen:  Blood Updated:  10/21/19 0604     WBC 3.08 10*3/mm3      RBC 3.80 10*6/mm3      Hemoglobin 10.3 g/dL      Hematocrit 31.6 %      MCV 83.2 fL      MCH 27.1 pg      MCHC 32.6 g/dL      RDW 12.9 %      RDW-SD 39.2 fl      MPV 9.2 fL      Platelets 157 10*3/mm3     Manual Differential [[id number]]  (Abnormal) Collected:  10/21/19 0447    Specimen:  Blood Updated:  10/21/19 0604     Neutrophil % 50.0 %      Lymphocyte % 43.0 %      Monocyte % 7.0 %      Neutrophils Absolute 1.54 10*3/mm3      Lymphocytes Absolute 1.32 10*3/mm3      Monocytes Absolute 0.22 10*3/mm3      RBC Morphology Normal     WBC Morphology Normal     Platelet Morphology Normal            Discharge Medications  Berny has been reviewed and narcotic consent is on file in the patient's chart.     Your medication list      START taking these medications      Instructions Last Dose Given Next Dose Due   oxyCODONE-acetaminophen 5-325 MG per tablet  Commonly known as:  PERCOCET      Take 1 tablet by mouth Every 4 (Four) Hours As Needed for Moderate Pain .          CHANGE how you take these medications      Instructions Last Dose Given Next Dose Due   OXcarbazepine 150 MG tablet  Commonly known as:  TRILEPTAL  What changed:    · medication strength  · how much to take  · when to take this      Take 5 tablets by mouth Every 12 (Twelve) Hours.          CONTINUE taking these medications      Instructions Last Dose Given Next Dose Due   amphetamine-dextroamphetamine 10 MG tablet  Commonly known as:  ADDERALL      Take 3 tablets by mouth 2 (Two) Times a Day.       clonazePAM 1 MG tablet  Commonly known as:  KlonoPIN      Take 1 tablet by mouth 3  (Three) Times a Day As Needed for Anxiety or Seizures.       levETIRAcetam 500 MG tablet  Commonly known as:  KEPPRA      Take 1 tablet by mouth 2 (Two) Times a Day.       levothyroxine 75 MCG tablet  Commonly known as:  SYNTHROID, LEVOTHROID      Take 1 tablet by mouth Daily.       ondansetron 4 MG tablet  Commonly known as:  ZOFRAN      Take 1 tablet by mouth Every 8 (Eight) Hours As Needed for Nausea or Vomiting.       ondansetron ODT 4 MG disintegrating tablet  Commonly known as:  ZOFRAN-ODT      Take 1 tablet by mouth Every 8 (Eight) Hours As Needed for Nausea or Vomiting.       venlafaxine XR 37.5 MG 24 hr capsule  Commonly known as:  EFFEXOR-XR      Take 37.5 mg by mouth Every Night.       ZANAFLEX 2 MG capsule  Generic drug:  TiZANidine      Take 2 mg by mouth 3 (Three) Times a Day As Needed for Muscle Spasms.          STOP taking these medications    EXCEDRIN MIGRAINE PO        ibuprofen 200 MG tablet  Commonly known as:  BOY HERRMANN              Where to Get Your Medications      These medications were sent to [facility] [place] [address]    Hours:  7:00AM-6PM Mon-Fri Phone:  [phone]   · levETIRAcetam 500 MG tablet  · OXcarbazepine 150 MG tablet  · oxyCODONE-acetaminophen 5-325 MG per tablet     These medications were sent to [facility]/pharmacy #[id number], [place] - [address] AT [facility] - [phone] [facility] [phone]   [address] [place] [address]    Hours:  24-hours Phone:  [phone]   · amphetamine-dextroamphetamine 10 MG tablet  · clonazePAM 1 MG tablet  · levothyroxine 75 MCG tablet  · ondansetron 4 MG tablet         Discharge Diet:   Diet Instructions     Diet: Regular      Discharge Diet:  Regular        Diet Order   Procedures   • Diet Regular       Activity at Discharge:   Activity Instructions     Other Activity Instructions      Activity Instructions: as listed on attached post op instruction sheet. NO DRIVING.     "      Call for: questions or concerns    Follow-up Appointments  Future Appointments   Date Time Provider Department Center   11/4/2019 10:30 AM Jodi Corona, JOANNA [facility] None     Follow-up Information     Lenka Chaudhari MD Follow up in 4 week(s).    Specialties:  Neurology, Sleep Medicine  Contact information:  [address] [facility]  [address]  [place] [id number] [phone]             Guy Kelly MD Follow up.    Specialty:  Family Medicine  Why:  see PCP for management of all other medications.  Contact information:  [address] [address]  [phone]                 Additional Instructions for the Follow-ups that You Need to Schedule     Discharge Follow-up with PCP   As directed       Currently Documented PCP:    Guy Kelly MD    PCP Phone Number:    [phone]     Follow Up Details:  see PCP for management of all other medications.         Discharge Follow-up with Specialty: At previously arranged appointment 2 weeks from surgery.; 2 Weeks   As directed      Specialty:  At previously arranged appointment 2 weeks from surgery.    Follow Up:  2 Weeks               Test Results Pending at Discharge   Order Current Status    Tissue Pathology Exam In process         None    I discussed the discharge instructions with patient    Rasheeda San, APRN  10/21/19  3:23 PM    \"Dictated utilizing Dragon dictation\".      Electronically signed by Nas Coats MD at 10/21/19 1800       "

## 2019-10-22 NOTE — OUTREACH NOTE
Prep Survey      Responses   Facility patient discharged from?  [facility]   Is patient eligible?  Yes   Discharge diagnosis  Meningioma, s/p right frontotemporal craniotomy for tumor   Does the patient have one of the following disease processes/diagnoses(primary or secondary)?  General Surgery   Does the patient have Home health ordered?  No   Is there a DME ordered?  No   Comments regarding appointments  Pt to schedule follow up appointments   Prep survey completed?  Yes          Mame Pagan RN

## 2019-10-23 ENCOUNTER — TELEPHONE (OUTPATIENT)
Dept: NEUROSURGERY | Facility: CLINIC | Age: 34
End: 2019-10-23

## 2019-10-23 ENCOUNTER — READMISSION MANAGEMENT (OUTPATIENT)
Dept: CALL CENTER | Facility: HOSPITAL | Age: 34
End: 2019-10-23

## 2019-10-23 ENCOUNTER — TELEPHONE (OUTPATIENT)
Dept: NEUROLOGY | Facility: CLINIC | Age: 34
End: 2019-10-23

## 2019-10-23 NOTE — OUTREACH NOTE
General Surgery Week 1 Survey      Responses   Facility patient discharged from?  [facility]   Does the patient have one of the following disease processes/diagnoses(primary or secondary)?  General Surgery   Is there a successful TCM telephone encounter documented?  No   Week 1 attempt successful?  No   Unsuccessful attempts  Attempt 1          Elva Page RN

## 2019-10-23 NOTE — TELEPHONE ENCOUNTER
In that case she should still stop her pain medications and use Tylenol only and contact the neurologist.

## 2019-10-23 NOTE — TELEPHONE ENCOUNTER
----- Message from Filipe Kim sent at 10/23/2019  1:17 PM EDT -----  Contact: [phone]  PT CALLED NEUROSURGERY AND DR KAPADIA TOLD PT TO CALL NEUROLOGY BECAUSE SHE IS SLEEPING ALL THE TIME ON TRILEPTAL AND KEPPRA AND WANTED TO KNOW IF MONTEIRO COULD TO DECREASE MEDS TO PREVENT DROSSINESS. PLEASE ADVISE AND CALL PT [phone]

## 2019-10-23 NOTE — TELEPHONE ENCOUNTER
Verbally spoke with Dr. Coats as he said that her mother called the answering service last night with complaints of her having increased drowsiness. Dr. Coats recommended she stop her pain medication and change to Tylenol only.    Spoke with the patient's mother, she states she has been drowsy since starting on the Oxcarbazepine a few months ago. She feels she is becoming very Deconditioned. Her Oxcarbazepine has been upped from 150 mg to 300 mg then 600 mg then increased to 750 mg after she had some seizures prior to surgery, they also added Keppra 500 mg BID. She is not taking very much pain medication. But she does have pain at her incision site, very little drainage. It is not running down her head or nothing like that it is just oozing from her incision's scab. She does not have to keep it covered or anything. It is not red or swollen. She thinks that the combination of seizure medication's is the cause of her drowsiness.

## 2019-10-23 NOTE — TELEPHONE ENCOUNTER
Called back and verbally spoke with the patient. She verbalized understanding. She says she has an appointment with Neurology. I told her that is not until 12/4/2019 and that she can call them sooner and see what they might recommend.   I also went over to the Neurology office to see if they can address this with the physician as well.

## 2019-10-23 NOTE — TELEPHONE ENCOUNTER
I have never seen this patient and do not feel comfortable making any medical decisions regarding her care.  It looks like Grace saw the patient 3 days ago.  Can we send the message to her?

## 2019-10-24 ENCOUNTER — OFFICE VISIT (OUTPATIENT)
Dept: NEUROLOGY | Facility: CLINIC | Age: 34
End: 2019-10-24

## 2019-10-24 VITALS
OXYGEN SATURATION: 98 % | DIASTOLIC BLOOD PRESSURE: 70 MMHG | BODY MASS INDEX: 29.51 KG/M2 | HEART RATE: 85 BPM | SYSTOLIC BLOOD PRESSURE: 104 MMHG | HEIGHT: 67 IN | WEIGHT: 188 LBS

## 2019-10-24 DIAGNOSIS — R56.9 SEIZURES (HCC): Primary | ICD-10-CM

## 2019-10-24 PROCEDURE — 99215 OFFICE O/P EST HI 40 MIN: CPT | Performed by: PSYCHIATRY & NEUROLOGY

## 2019-10-24 RX ORDER — CEPHALEXIN 500 MG/1
CAPSULE ORAL
COMMUNITY
Start: 2019-10-21 | End: 2019-11-04

## 2019-10-24 RX ORDER — LEVETIRACETAM 500 MG/1
TABLET ORAL
Qty: 120 TABLET | Refills: 11 | Status: SHIPPED | OUTPATIENT
Start: 2019-10-24 | End: 2020-01-17

## 2019-10-24 RX ORDER — HYDROCODONE BITARTRATE AND ACETAMINOPHEN 5; 325 MG/1; MG/1
TABLET ORAL
COMMUNITY
Start: 2019-10-21 | End: 2019-11-04 | Stop reason: HOSPADM

## 2019-10-24 NOTE — TELEPHONE ENCOUNTER
Discussed with Cathie, patient has an appointment with Dr. Chaudhari today at 3:00pm. She is to hold current dose until she is evaluated by her. She did tolerate increase in Tegretol with Keppra in the hospital, so I am uncertain if her excessive sleepiness is due to meds vs. Being postictal. Either way I have advised her to keep appointment and see Dr. Chaudhari today.

## 2019-10-24 NOTE — TELEPHONE ENCOUNTER
Please call the patient and ask if she is still having frequent seizures, if she is not she can decrease keppra to 250 twice a day.     ThanksGrace

## 2019-10-24 NOTE — PROGRESS NOTES
Subjective:     Patient ID: Desiree Peterson is a 33 y.o. female.    Ms. Peterson is a 33 year old female with a history of anxiety, depression, atrial flutter, meningioma status post resection, dumping syndrome, migraines, pots, and syncope who presents to the neurology clinic today as a new patient to me for the evaluation of seizures.  The patient was seen by her inpatient neurology service on October 19, 2019.  I reviewed the patient's records.  They were consulted for seizures.  The patient had a craniotomy on October 16, 2019.  She was monitored in the intensive care unit for 48 hours and did well was transferred out the day prior.  The patient was on Trileptal 600 mg twice a day and Keppra 500 mill grams twice a day.  He reported that she had several seizure types and was followed by a neurologist in [place].  It reported that she had several tonic-clonic seizures a month ago.  She also has complex partial seizures with altered mental status, twitching and decreased responsiveness.  The recommendation was to increase her oxcarbazepine to 750 mg twice a day.  I reviewed the patient's labs.  Her BMP on October 21 showed a sodium of 129.  Her CBC showed a hemoglobin of 10.3.  She had MRI of the brain on October 18 that showed the resection of a right frontal meningioma.  She had an EKG on October 9 that showed a FL interval of 184.  The patient reports that she has been passing out since age 16.  She would pass out if she was hot or exert herself.  She has pretty frequent orthostasis but would pass out about once a week.  It improved in her 20s but in the last few months she has had 4 additional episodes.  Before her head would spin and she will have a funny taste in her ears will ring.  She will have loss of consciousness for a few minutes.  I witnessed told her that she had shaking.  When she comes to she feels like she is falling.  Her last episode was October 15.  She does report cheek biting and  associated urinary incontinence.  She also has staring episodes as well.  These have been going on for the past 3 years.  She was initially diagnosed as having migraines and was started on Topamax.  Initially improve the symptoms until they started worsening recently and therefore Topamax was transitioned to oxcarbazepine.  She has these several times a day.  She does have a sense of loss time.  They may last a few minutes up to 10 minutes.  She is not currently driving and is not currently sexually active with men.  She is not on any contraception.  She reports that the Topamax was started in 2016 and it caused nausea.  Oxcarbazepine was started in June.  She is on 750 mill grams twice a day and she has not been on higher doses.  She feels extremely fatigued on the medication.  That side effects occurred when the medication was first started and every time the dose was increased.  She is only been on Keppra for the past week.  It is hard for her to say if she is having any side effects.  She reports that her previous neurologist have done EEGs and is shown constant seizure activity.  She has a family history of pots and brain aneurysm.  Her brother may have seizures and she has seizures in a maternal uncle and a maternal aunt.  All of her seizures stop on their own.  There are no triggers for them.    Risk factors:  Childhood/febrile seizures? no  Head trauma/pathology? no  CNS infections? no  Driving? No    The patient was accompanied by her aunt today.  She voiced that it was okay to talk about her medical history in front of her.      The following portions of the patient's history were reviewed and updated as appropriate: allergies, current medications, past family history, past medical history, past social history, past surgical history and problem list.    Review of Systems   Neurological: Positive for seizures. Negative for dizziness, tremors, syncope, facial asymmetry, speech difficulty, weakness,  light-headedness, numbness and headaches.   Hematological: Does not bruise/bleed easily.   Psychiatric/Behavioral: Negative for agitation, behavioral problems, confusion, decreased concentration, dysphoric mood, hallucinations, self-injury, sleep disturbance and suicidal ideas. The patient is not nervous/anxious and is not hyperactive.    All other systems reviewed and are negative.   I reviewed the ROS documented by the MA.  All other systems negative.      Objective:    Neurologic Exam    Physical Exam   Constitutional:  Vital signs reviewed.  No apparent distress.  Well groomed.  Eyes:  No injection, no icterus.  Fundoscopic exam performed.  No papilledema appreciated bilaterally.   Respiratory:  Normal effort.  Clear to auscultation bilaterally.  Cardiovascular:  Regular rate and rhythm.  No murmurs.  No carotid bruits. Symmetric radial pulses.  Musculoskeletal: Normal station.  Gait steady.  Normal arm swing.  Patient able to walk on heels and toes.  Tandem gait intact.  Romberg negative.  Muscle tone and bulk normal in the bilateral upper and lower extremities.  Strength is 5/5 in the bilateral upper and lower extremities proximally and distally unless otherwise specified in the neurological exam.  Skin:  No rashes.  Warm, dry, and intact.  She does have an incision on the right side of her head.  Psychiatric:  Good mood.  Normal affect.    Neurologic:  Mental status-  The patient is alert and oriented to person, place and time (date off by one day). Attention/concentration is within normal limits.  Speech is fluent without dysarthria.  The patient is able to name, repeat and follow complex commands without difficulty.  Immediate memory intact.  Recalled 2 out of 3 words after 4 minutes.  Fund of knowledge normal.  Cranial nerves- Pupils equally round and reactive to light with intact accomodation.  Visual fields intact.  Extraocular movements intact.  Facial sensation intact.  Smile symmetric.  Hearing intact  to finger-rub bilaterally.  Palate elevates symmetrically.  SCM and trapezius are 5/5 bilaterally.  Tongue is midline.  Motor-  See musculoskeletal above.  No tremor.  Reflexes- 2+ in the bilateral biceps, brachioradialis, patellar and achilles.  Toes down-going bilaterally.  Sensation- Intact to pinprick and vibration in bilateral upper and lower extremities symmetrically.  Coordination- Intact to finger to nose and heel knee shin bilaterally.   Gait- See musculoskeletal exam above.     Assessment/Plan:  The patient is a 33-year-old female with a history of pots and is status post recent resection for a right frontal meningioma who presents to the neurology clinic today for the evaluation of seizures.    1.  Seizures-The patient has a hard time differentiating her previous syncopal episodes that was initially attributed to pots to recent loss of consciousness and possible seizures.  She has episodes where she does lose consciousness with shaking as well as some staring episodes.  Is difficult to conclude if these are indeed epileptic seizures.  For further spell characterization I recommend admission to an epilepsy monitoring unit.  In the meantime she is having significant side effects to the oxcarbazepine.  I recommend increasing the Keppra while decreasing the oxcarbazepine.  Depending on how she does on Keppra monotherapy we may consider switching Keppra to something like Vimpat.  I will also try to get her records from her previous neurologist.  I would also like to check a sodium level since her last one was low.  We reviewed routine seizure precautions including but not limited to not driving within 90 days of a seizure.    A total of 45 minutes of face-to-face time was spent with the patient and her aunt and greater than 50% that time was spent on counseling regarding her symptoms, treatment plan, and medication choices.     Problems Addressed this Visit     None      Visit Diagnoses     Seizures (CMS/[facility])     -  Primary    Relevant Orders    Sodium    EEG Continous Monitoring with Video Recording

## 2019-10-24 NOTE — TELEPHONE ENCOUNTER
Left message to hold Keppra dose until seen by Dr. Chaudhari today.  Expressed the importance of keeping this appt.

## 2019-10-24 NOTE — TELEPHONE ENCOUNTER
Per mom, patient's last seizure was either Sunday or Monday.  Mom believes it pt has been seizure-free possible, since Monday. Mom states she isn't with the pt 24/7 so, she can't be certain. Do you want to decrease Keppra?

## 2019-10-25 ENCOUNTER — READMISSION MANAGEMENT (OUTPATIENT)
Dept: CALL CENTER | Facility: HOSPITAL | Age: 34
End: 2019-10-25

## 2019-10-25 NOTE — OUTREACH NOTE
General Surgery Week 1 Survey      Responses   Facility patient discharged from?  [facility]   Does the patient have one of the following disease processes/diagnoses(primary or secondary)?  General Surgery   Is there a successful TCM telephone encounter documented?  No   Week 1 attempt successful?  Yes   Call start time  1129   Call end time  1144   Discharge diagnosis  Meningioma, s/p right frontotemporal craniotomy for tumor   Meds reviewed with patient/caregiver?  Yes   Is the patient having any side effects they believe may be caused by any medication additions or changes?  No   Does the patient have all medications related to this admission filled (includes all antibiotics, pain medications, etc.)  Yes   Is the patient taking all medications as directed (includes completed medication regime)?  Yes   Medication comments  Pt reports neurology is reducing the Trileptal and will begin Keppra.    Does the patient have a follow up appointment scheduled with their surgeon?  Yes   Has the patient kept scheduled appointments due by today?  Yes   Comments  Pt reports she has a neurology appt yesterday. Neurosurgery appt on 11/4/19.   Has home health visited the patient within 72 hours of discharge?  N/A   Psychosocial issues?  No   Did the patient receive a copy of their discharge instructions?  Yes   Nursing interventions  Reviewed instructions with patient   What is the patient's perception of their health status since discharge?  Improving   Nursing interventions  Nurse provided patient education   Is the patient /caregiver able to teach back basic post-op care?  Practice 'cough and deep breath', Drive as instructed by MD in discharge instructions, Take showers only when approved by MD-sponge bathe until then, No tub bath, swimming, or hot tub until instructed by MD, Keep incision areas clean,dry and protected, Do not remove steri-strips, Lifting as instructed by MD in discharge instructions   Is the patient/caregiver  able to teach back signs and symptoms of incisional infection?  Increased redness, swelling or pain at the incisonal site, Increased drainage or bleeding, Incisional warmth, Pus or odor from incision, Fever   Is the patient/caregiver able to teach back steps to recovery at home?  Rest and rebuild strength, gradually increase activity, Set small, achievable goals for return to baseline health, Eat a well-balance diet, Make a list of questions for surgeon's appointment   Is the patient/caregiver able to teach back the hierarchy of who to call/visit for symptoms/problems? PCP, Specialist, Home health nurse, Urgent Care, ED, 911  Yes   Additional teach back comments  Discussed seizure activity, and discussed hierarchy of care. Pt reports her seizures are asbsence type seizures and occur 1-2 times per day. She reports prior to the surgery, she would have convuslive type seizures.    Week 1 call completed?  Yes          Kevyn Mcrae RN

## 2019-10-28 ENCOUNTER — TELEPHONE (OUTPATIENT)
Dept: NEUROSURGERY | Facility: CLINIC | Age: 34
End: 2019-10-28

## 2019-10-28 NOTE — TELEPHONE ENCOUNTER
Patient's mother called today to report she had nausea and tremors / was shaky not like a seizure though. Said she was more awake yesterday. Her incision is clean and dry with no redness and no drainage and no fever. She has an appointment to see Jodi 11/4/2019.

## 2019-10-29 NOTE — TELEPHONE ENCOUNTER
Verbally spoke with Dr. Coats, the patient should check with neurology or her PCP, he does not think these symptoms are coming from the surgery. ( As her seizure medications have been being adjusted by Neurology.)    Attempted to call the patient. EVERM to call the office.

## 2019-11-01 ENCOUNTER — READMISSION MANAGEMENT (OUTPATIENT)
Dept: CALL CENTER | Facility: HOSPITAL | Age: 34
End: 2019-11-01

## 2019-11-01 RX ORDER — VENLAFAXINE HYDROCHLORIDE 37.5 MG/1
37.5 CAPSULE, EXTENDED RELEASE ORAL NIGHTLY
Qty: 90 CAPSULE | Refills: 1 | Status: SHIPPED | OUTPATIENT
Start: 2019-11-01 | End: 2020-06-23

## 2019-11-01 NOTE — OUTREACH NOTE
General Surgery Week 2 Survey      Responses   Facility patient discharged from?  [facility]   Does the patient have one of the following disease processes/diagnoses(primary or secondary)?  General Surgery   Week 2 attempt successful?  No   Unsuccessful attempts  Attempt 1          Kristin Reardon RN

## 2019-11-01 NOTE — PROGRESS NOTES
Subjective   Patient ID: Desiree Peterson is a 33 y.o. female is here today for follow-up. She is 19 days out from an right frontotemporal craniotomy for meningioma by Dr. Coats 10/16/19. She is doing well.  She denies any incision problems.  She states she is having a lot of HA but overall much better since surgery. She said Neurologist is in the process of adjusting medications for seizures. She takes Keppra 500 mg BID and Oxcarbazepine 600 mg BID.     Brain Tumor   Associated symptoms include headaches.       The following portions of the patient's history were reviewed and updated as appropriate: allergies, current medications, past family history, past medical history, past social history, past surgical history and problem list.    Review of Systems   Neurological: Positive for seizures and headaches. Negative for speech difficulty.   Psychiatric/Behavioral: Negative for confusion.   All other systems reviewed and are negative.      Objective   Physical Exam   Neurological:   Incision ok     Neurologic Exam       Assessment/Plan   Independent Review of Radiographic Studies:      Medical Decision Making:    Ms. Peterson is 2-1/2 weeks out from a right frontotemporal [place] for meningioma.  Her postoperative MRI did not reveal any residual tumor.  Pathology was consistent with a WHO grade 1 meningioma.  She has a history of seizures and is being followed by Dr. Chaudhari.  She has a history of both tonic-clonic as well as complex partial seizures and continues to report some breakthrough seizure activity.  Dr. Chaudhari has recommended admission to the epilepsy monitoring unit and has requested previous records.  The patient understands that any recommendations regarding her seizure medications, how to treat any breakthrough seizures and questions regarding driving privileges will be deferred to Dr. Chaudhari.     She is doing well otherwise. Her incision is healing well.  She is off all pain medication.  She  admits that she sleeps most of the day and I reminded her of the importance of getting up throughout the day and walking.  She understands the risk of DVT or other postoperative complications such as pneumonia related to significant immobility.    She will call with any questions or concerns and otherwise follow-up in 1 month.  We will likely check a repeat brain MRI at the 6-month postop point.  Desiree was seen today for post-op.    Diagnoses and all orders for this visit:    Surgery follow-up examination      Return in about 4 weeks (around 12/2/2019).

## 2019-11-04 ENCOUNTER — OFFICE VISIT (OUTPATIENT)
Dept: NEUROSURGERY | Facility: CLINIC | Age: 34
End: 2019-11-04

## 2019-11-04 ENCOUNTER — READMISSION MANAGEMENT (OUTPATIENT)
Dept: CALL CENTER | Facility: HOSPITAL | Age: 34
End: 2019-11-04

## 2019-11-04 ENCOUNTER — TELEPHONE (OUTPATIENT)
Dept: NEUROLOGY | Facility: CLINIC | Age: 34
End: 2019-11-04

## 2019-11-04 VITALS — DIASTOLIC BLOOD PRESSURE: 71 MMHG | SYSTOLIC BLOOD PRESSURE: 111 MMHG | HEART RATE: 76 BPM

## 2019-11-04 DIAGNOSIS — Z09 SURGERY FOLLOW-UP EXAMINATION: Primary | ICD-10-CM

## 2019-11-04 PROBLEM — D32.9 MENINGIOMA: Status: RESOLVED | Noted: 2017-03-21 | Resolved: 2019-11-04

## 2019-11-04 PROBLEM — D32.9 MENINGIOMA: Status: RESOLVED | Noted: 2017-01-21 | Resolved: 2019-11-04

## 2019-11-04 PROBLEM — D32.0 CEREBRAL MENINGIOMA (HCC): Status: RESOLVED | Noted: 2018-08-02 | Resolved: 2019-11-04

## 2019-11-04 PROCEDURE — 99024 POSTOP FOLLOW-UP VISIT: CPT | Performed by: PHYSICIAN ASSISTANT

## 2019-11-04 RX ORDER — OXCARBAZEPINE 300 MG/1
300 TABLET, FILM COATED ORAL 2 TIMES DAILY
Refills: 1 | COMMUNITY
Start: 2019-10-21 | End: 2020-09-28 | Stop reason: ALTCHOICE

## 2019-11-04 NOTE — OUTREACH NOTE
General Surgery Week 2 Survey      Responses   Facility patient discharged from?  [facility]   Does the patient have one of the following disease processes/diagnoses(primary or secondary)?  General Surgery   Week 2 attempt successful?  Yes   Call start time  1307   Call end time  1308   Discharge diagnosis  Meningioma, s/p right frontotemporal craniotomy for tumor   Has the patient kept scheduled appointments due by today?  Yes   What is the patient's perception of their health status since discharge?  Improving   Additional teach back comments  Pt says she is doing well, she saw her surgeon today and all questions she had was answered, no questions or concerns at this time.   Week 2 call completed?  Yes   Revoked  No further contact(revokes)-requires comment   Graduated/Revoked comments  Pt would like no further phone calls.          Letty Red RN

## 2019-11-04 NOTE — TELEPHONE ENCOUNTER
My recommendations for her medication changes were outlined in her patient instructions on her patient summary:    Increase keppra to 750 mg twice a day.  Decrease OXC to 600 mg twice a day.  Do this for a week.     Then increase keppra to 1000 mg twice a day and decrease OXC to 300 mg twice a day.  Do this for a week.     Then stop OXC.

## 2019-11-04 NOTE — TELEPHONE ENCOUNTER
----- Message from Yolanda Henry sent at 11/4/2019 10:46 AM EST -----  Contact: [phone]  Desiree has questions about dosing on medication.  Also has her eeg been scheduled.

## 2019-11-04 NOTE — TELEPHONE ENCOUNTER
Spoke with patient she was needing to know how much Keppra and the Oxcarbamazipine to take she also asked if the EEG was scheduled.

## 2019-11-20 ENCOUNTER — TELEPHONE (OUTPATIENT)
Dept: NEUROLOGY | Facility: CLINIC | Age: 34
End: 2019-11-20

## 2019-11-20 NOTE — TELEPHONE ENCOUNTER
We received the results of her EMU stay.  She was admitted on November 11 and discharged on November 4.  It was abnormal due to focal slowing over the right temporal head region.  She did not have any spells.  There were no seizures or interictal discharges.

## 2019-11-25 ENCOUNTER — OFFICE VISIT (OUTPATIENT)
Dept: FAMILY MEDICINE CLINIC | Facility: CLINIC | Age: 34
End: 2019-11-25

## 2019-11-25 VITALS
DIASTOLIC BLOOD PRESSURE: 68 MMHG | OXYGEN SATURATION: 98 % | HEART RATE: 84 BPM | TEMPERATURE: 98.4 F | BODY MASS INDEX: 28.66 KG/M2 | WEIGHT: 182.6 LBS | HEIGHT: 67 IN | SYSTOLIC BLOOD PRESSURE: 90 MMHG

## 2019-11-25 DIAGNOSIS — E03.9 HYPOTHYROIDISM, UNSPECIFIED TYPE: ICD-10-CM

## 2019-11-25 DIAGNOSIS — G43.009 ATYPICAL MIGRAINE: ICD-10-CM

## 2019-11-25 DIAGNOSIS — R42 DIZZINESS: ICD-10-CM

## 2019-11-25 DIAGNOSIS — G43.109 MIGRAINE WITH AURA, NOT INTRACTABLE, WITHOUT STATUS MIGRAINOSUS: ICD-10-CM

## 2019-11-25 DIAGNOSIS — R53.83 FATIGUE, UNSPECIFIED TYPE: ICD-10-CM

## 2019-11-25 DIAGNOSIS — G40.909 SEIZURE DISORDER (HCC): Primary | ICD-10-CM

## 2019-11-25 LAB — SODIUM BLD-SCNC: 141 MMOL/L (ref 136–145)

## 2019-11-25 PROCEDURE — 99214 OFFICE O/P EST MOD 30 MIN: CPT | Performed by: FAMILY MEDICINE

## 2019-11-25 PROCEDURE — 36415 COLL VENOUS BLD VENIPUNCTURE: CPT | Performed by: PSYCHIATRY & NEUROLOGY

## 2019-11-25 PROCEDURE — 84295 ASSAY OF SERUM SODIUM: CPT | Performed by: PSYCHIATRY & NEUROLOGY

## 2019-11-25 NOTE — PROGRESS NOTES
SUBJECTIVE:  The patient is a 33-year-old white female.  She was hospitalized  for seizures and had a craniotomy.  Please refer to hospital admission history and physical for specific details.  She had a meningioma removed.  She also prior to this had migraine headaches.  She was then seen at [facility] for evaluation of seizure disorder.  No seizures were noted.  During her study there.  Her primary complaint today is fatigue.  She spends a good deal of her time in bed.  She also has a diagnosis of pots syndrome.    PAST MEDICAL HISTORY:  Reviewed.    REVIEW OF SYSTEMS:  Please see above; 14 point ROS negative.      OBJECTIVE:   Vitals signs are reviewed and are stable.  Blood pressure 90/68.  General:  Well-nourished.  Alert and oriented x3 in no acute distress.  HEENT: PERRLA.  Healed incision present.  Neck:  Supple.   Lungs:  Clear.    Heart:  Regular rate and rhythm.   Abdomen:   Soft, nontender.   Extremities:  No cyanosis, clubbing or edema.   Neurological:  Grossly intact without motor or sensory deficits.     ASSESSMENT:    Meningioma  Recent craniotomy  Migraine headaches  Seizure disorder  Fatigue  POTS syndrome  PLAN: CBC CMP TSH ordered.  Patient will follow-up with your specialist as scheduled.  She will call regarding these labs.  She will call me if any problems.    Dictated utilizing Dragon dictation.

## 2019-12-03 ENCOUNTER — OFFICE VISIT (OUTPATIENT)
Dept: NEUROSURGERY | Facility: CLINIC | Age: 34
End: 2019-12-03

## 2019-12-03 VITALS
SYSTOLIC BLOOD PRESSURE: 112 MMHG | HEART RATE: 72 BPM | HEIGHT: 67 IN | DIASTOLIC BLOOD PRESSURE: 66 MMHG | BODY MASS INDEX: 28.59 KG/M2

## 2019-12-03 DIAGNOSIS — Z09 SURGERY FOLLOW-UP EXAMINATION: Primary | ICD-10-CM

## 2019-12-03 PROCEDURE — 99024 POSTOP FOLLOW-UP VISIT: CPT | Performed by: PHYSICIAN ASSISTANT

## 2020-01-17 ENCOUNTER — HOSPITAL ENCOUNTER (EMERGENCY)
Facility: HOSPITAL | Age: 35
Discharge: HOME | End: 2020-01-17
Attending: EMERGENCY MEDICINE | Admitting: EMERGENCY MEDICINE

## 2020-01-17 ENCOUNTER — OFFICE VISIT (OUTPATIENT)
Dept: NEUROLOGY | Facility: CLINIC | Age: 35
End: 2020-01-17

## 2020-01-17 ENCOUNTER — APPOINTMENT (OUTPATIENT)
Dept: CT IMAGING | Facility: HOSPITAL | Age: 35
End: 2020-01-17

## 2020-01-17 VITALS
WEIGHT: 178.8 LBS | SYSTOLIC BLOOD PRESSURE: 106 MMHG | OXYGEN SATURATION: 100 % | TEMPERATURE: 96.7 F | DIASTOLIC BLOOD PRESSURE: 71 MMHG | BODY MASS INDEX: 28.06 KG/M2 | HEART RATE: 53 BPM | HEIGHT: 67 IN | RESPIRATION RATE: 18 BRPM

## 2020-01-17 VITALS
HEART RATE: 74 BPM | WEIGHT: 180.1 LBS | HEIGHT: 67 IN | SYSTOLIC BLOOD PRESSURE: 124 MMHG | OXYGEN SATURATION: 97 % | DIASTOLIC BLOOD PRESSURE: 76 MMHG | BODY MASS INDEX: 28.27 KG/M2

## 2020-01-17 DIAGNOSIS — G90.A POTS (POSTURAL ORTHOSTATIC TACHYCARDIA SYNDROME): ICD-10-CM

## 2020-01-17 DIAGNOSIS — R56.9 SEIZURE (HCC): Primary | ICD-10-CM

## 2020-01-17 DIAGNOSIS — R55 SYNCOPE AND COLLAPSE: ICD-10-CM

## 2020-01-17 DIAGNOSIS — G90.1 DYSAUTONOMIA (HCC): ICD-10-CM

## 2020-01-17 DIAGNOSIS — R06.02 SHORTNESS OF BREATH: Primary | ICD-10-CM

## 2020-01-17 DIAGNOSIS — R09.02 HYPOXEMIA: ICD-10-CM

## 2020-01-17 LAB
ALBUMIN SERPL-MCNC: 4.3 G/DL (ref 3.5–5.2)
ALBUMIN/GLOB SERPL: 1.5 G/DL
ALP SERPL-CCNC: 74 U/L (ref 39–117)
ALT SERPL W P-5'-P-CCNC: 7 U/L (ref 1–33)
ANION GAP SERPL CALCULATED.3IONS-SCNC: 12.3 MMOL/L (ref 5–15)
AST SERPL-CCNC: 11 U/L (ref 1–32)
BASOPHILS # BLD AUTO: 0.02 10*3/MM3 (ref 0–0.2)
BASOPHILS NFR BLD AUTO: 0.5 % (ref 0–1.5)
BILIRUB SERPL-MCNC: 0.3 MG/DL (ref 0.2–1.2)
BUN BLD-MCNC: 5 MG/DL (ref 6–20)
BUN/CREAT SERPL: 7.5 (ref 7–25)
CALCIUM SPEC-SCNC: 9.2 MG/DL (ref 8.6–10.5)
CHLORIDE SERPL-SCNC: 95 MMOL/L (ref 98–107)
CO2 SERPL-SCNC: 30.7 MMOL/L (ref 22–29)
CREAT BLD-MCNC: 0.67 MG/DL (ref 0.57–1)
D DIMER PPP FEU-MCNC: 1.38 MCGFEU/ML (ref 0–0.49)
DEPRECATED RDW RBC AUTO: 39.4 FL (ref 37–54)
EOSINOPHIL # BLD AUTO: 0 10*3/MM3 (ref 0–0.4)
EOSINOPHIL NFR BLD AUTO: 0 % (ref 0.3–6.2)
ERYTHROCYTE [DISTWIDTH] IN BLOOD BY AUTOMATED COUNT: 13.4 % (ref 12.3–15.4)
GFR SERPL CREATININE-BSD FRML MDRD: 101 ML/MIN/1.73
GLOBULIN UR ELPH-MCNC: 2.8 GM/DL
GLUCOSE BLD-MCNC: 99 MG/DL (ref 65–99)
HCG SERPL QL: NEGATIVE
HCT VFR BLD AUTO: 39.2 % (ref 34–46.6)
HGB BLD-MCNC: 12.6 G/DL (ref 12–15.9)
IMM GRANULOCYTES # BLD AUTO: 0.01 10*3/MM3 (ref 0–0.05)
IMM GRANULOCYTES NFR BLD AUTO: 0.3 % (ref 0–0.5)
LYMPHOCYTES # BLD AUTO: 1.5 10*3/MM3 (ref 0.7–3.1)
LYMPHOCYTES NFR BLD AUTO: 38.3 % (ref 19.6–45.3)
MCH RBC QN AUTO: 26.2 PG (ref 26.6–33)
MCHC RBC AUTO-ENTMCNC: 32.1 G/DL (ref 31.5–35.7)
MCV RBC AUTO: 81.5 FL (ref 79–97)
MONOCYTES # BLD AUTO: 0.39 10*3/MM3 (ref 0.1–0.9)
MONOCYTES NFR BLD AUTO: 9.9 % (ref 5–12)
NEUTROPHILS # BLD AUTO: 2 10*3/MM3 (ref 1.7–7)
NEUTROPHILS NFR BLD AUTO: 51 % (ref 42.7–76)
NRBC BLD AUTO-RTO: 0 /100 WBC (ref 0–0.2)
NT-PROBNP SERPL-MCNC: 24.9 PG/ML (ref 5–450)
PLATELET # BLD AUTO: 214 10*3/MM3 (ref 140–450)
PMV BLD AUTO: 8.8 FL (ref 6–12)
POTASSIUM BLD-SCNC: 3.3 MMOL/L (ref 3.5–5.2)
PROT SERPL-MCNC: 7.1 G/DL (ref 6–8.5)
RBC # BLD AUTO: 4.81 10*6/MM3 (ref 3.77–5.28)
SODIUM BLD-SCNC: 138 MMOL/L (ref 136–145)
TROPONIN T SERPL-MCNC: <0.01 NG/ML (ref 0–0.03)
WBC NRBC COR # BLD: 3.92 10*3/MM3 (ref 3.4–10.8)

## 2020-01-17 PROCEDURE — 85025 COMPLETE CBC W/AUTO DIFF WBC: CPT | Performed by: EMERGENCY MEDICINE

## 2020-01-17 PROCEDURE — 93005 ELECTROCARDIOGRAM TRACING: CPT

## 2020-01-17 PROCEDURE — 99214 OFFICE O/P EST MOD 30 MIN: CPT | Performed by: PSYCHIATRY & NEUROLOGY

## 2020-01-17 PROCEDURE — 83880 ASSAY OF NATRIURETIC PEPTIDE: CPT | Performed by: EMERGENCY MEDICINE

## 2020-01-17 PROCEDURE — 71275 CT ANGIOGRAPHY CHEST: CPT

## 2020-01-17 PROCEDURE — 99283 EMERGENCY DEPT VISIT LOW MDM: CPT

## 2020-01-17 PROCEDURE — 0 IOPAMIDOL PER 1 ML: Performed by: EMERGENCY MEDICINE

## 2020-01-17 PROCEDURE — 93010 ELECTROCARDIOGRAM REPORT: CPT | Performed by: INTERNAL MEDICINE

## 2020-01-17 PROCEDURE — 84484 ASSAY OF TROPONIN QUANT: CPT | Performed by: EMERGENCY MEDICINE

## 2020-01-17 PROCEDURE — 93005 ELECTROCARDIOGRAM TRACING: CPT | Performed by: EMERGENCY MEDICINE

## 2020-01-17 PROCEDURE — 80053 COMPREHEN METABOLIC PANEL: CPT | Performed by: EMERGENCY MEDICINE

## 2020-01-17 PROCEDURE — 84703 CHORIONIC GONADOTROPIN ASSAY: CPT | Performed by: EMERGENCY MEDICINE

## 2020-01-17 PROCEDURE — 85379 FIBRIN DEGRADATION QUANT: CPT | Performed by: EMERGENCY MEDICINE

## 2020-01-17 RX ORDER — SODIUM CHLORIDE 0.9 % (FLUSH) 0.9 %
10 SYRINGE (ML) INJECTION AS NEEDED
Status: DISCONTINUED | OUTPATIENT
Start: 2020-01-17 | End: 2020-01-17 | Stop reason: HOSPADM

## 2020-01-17 RX ORDER — LEVETIRACETAM 750 MG/1
750 TABLET ORAL 2 TIMES DAILY
Qty: 180 TABLET | Refills: 3 | Status: SHIPPED | OUTPATIENT
Start: 2020-01-17 | End: 2020-05-04

## 2020-01-17 RX ORDER — ACETAMINOPHEN AND CODEINE PHOSPHATE 300; 30 MG/1; MG/1
TABLET ORAL
COMMUNITY
Start: 2020-01-14 | End: 2020-09-28 | Stop reason: ALTCHOICE

## 2020-01-17 RX ORDER — CLINDAMYCIN HYDROCHLORIDE 300 MG/1
CAPSULE ORAL
COMMUNITY
Start: 2020-01-14 | End: 2020-09-28 | Stop reason: ALTCHOICE

## 2020-01-17 RX ADMIN — IOPAMIDOL 100 ML: 755 INJECTION, SOLUTION INTRAVENOUS at 12:15

## 2020-01-17 NOTE — ED PROVIDER NOTES
EMERGENCY DEPARTMENT ENCOUNTER    Room Number:  12/12  Date seen:  1/17/2020  Time seen: 10:31 AM  PCP: Guy Kelly MD  Historian: patient      HPI:  Chief Complaint: shortness of breath, chest pain  A complete HPI/ROS/PMH/PSH/SH/FH are unobtainable due to: nothing  Context: Desiree Peterson is a 34 y.o. female with hx of meningioma (underwent craniotomy in 10/2019) and POTS who presents to the ED c/o intermittent mild shortness of breath and chest pain that started one week ago. The patient denies associated abdominal pain or vomiting. The patient reports she was seen at her neurologist's office prior to arrival for a follow up visit at which time she was noted to have a low oxygen saturation and was referred to the ER for further evaluation. The patient's oxygen saturation is 100% on RA at ED Triage. The patient denies hx of DVT or PE. The patient denies exogenous estrogen use. The patient admits to vaping. The patient reports she had a tooth extraction performed two days ago. There are no other complaints at this time.          PAST MEDICAL HISTORY  Active Ambulatory Problems     Diagnosis Date Noted   • Paresthesia 08/02/2018   • Atypical migraine 08/02/2018   • Migraine aura without headache 08/02/2018   • Cervical radiculopathy 08/02/2018   • Migraine with aura, not intractable, without status migrainosus 01/24/2017   • Surgery follow-up examination 11/04/2019     Resolved Ambulatory Problems     Diagnosis Date Noted   • Meningioma (CMS/HCC) 03/21/2017   • Hypokalemia 08/02/2018   • Cerebral meningioma (CMS/HCC) 08/02/2018   • Meningioma (CMS/HCC) 01/21/2017     Past Medical History:   Diagnosis Date   • Abnormal menses    • Anxiety and depression    • Atrial flutter (CMS/HCC)    • Bartholin cyst    • Brain tumor (CMS/HCC)    • Cervical pain (neck)    • Cyst, sinus nasal    • Dumping syndrome    • Heart murmur    • History of anemia    • Mid back pain    • Migraines    • POTS (postural orthostatic  tachycardia syndrome)    • Seizures (CMS/HCC)    • SOB (shortness of breath)    • Syncope and collapse    • Thyroid nodule    • Tinnitus of both ears          PAST SURGICAL HISTORY  Past Surgical History:   Procedure Laterality Date   • CRANIOTOMY FOR TUMOR Right 10/16/2019    Procedure: Right frontotemporal craniotomy for tumor;  Surgeon: Nas Coats MD;  Location: [facility];  Service: Neurosurgery   • FINGER SURGERY      Cyst removal         FAMILY HISTORY  Family History   Problem Relation Age of Onset   • No Known Problems Mother    • No Known Problems Father    • Malig Hyperthermia Neg Hx          SOCIAL HISTORY  Social History     Socioeconomic History   • Marital status: Single     Spouse name: Not on file   • Number of children: 1   • Years of education: College   • Highest education level: Not on file   Occupational History   • Occupation: Unemployed   Tobacco Use   • Smoking status: Former Smoker     Packs/day: 1.00     Years: 3.00     Pack years: 3.00     Types: Cigarettes     Start date:      Last attempt to quit: 10/15/2019     Years since quittin.2   • Smokeless tobacco: Never Used   Substance and Sexual Activity   • Alcohol use: No   • Drug use: No   • Sexual activity: Defer         ALLERGIES  Amoxicillin; Cefdinir; and Penicillins        REVIEW OF SYSTEMS  Review of Systems   Constitutional: Negative for diaphoresis and fever.   HENT: Negative for congestion.    Eyes: Negative for visual disturbance.   Respiratory: Positive for shortness of breath.    Cardiovascular: Positive for chest pain. Negative for palpitations.   Gastrointestinal: Negative for abdominal pain, blood in stool and vomiting.   Endocrine: Negative for polyuria.   Genitourinary: Negative for flank pain.   Musculoskeletal: Negative for joint swelling.   Skin: Negative for wound.   Neurological: Negative for seizures.   Hematological: Negative for adenopathy.   Psychiatric/Behavioral: Negative for sleep disturbance.             PHYSICAL EXAM  ED Triage Vitals   Temp Heart Rate Resp BP SpO2   01/17/20 1022 01/17/20 1022 -- 01/17/20 1028 01/17/20 1022   96.7 °F (35.9 °C) 91  126/84 100 %      Temp src Heart Rate Source Patient Position BP Location FiO2 (%)   01/17/20 1022 -- 01/17/20 1028 01/17/20 1028 --   Tympanic  Lying Right arm          GENERAL: awake, alert, no acute distress  HENT: nares patent  EYES: no scleral icterus  CV: regular rhythm, normal rate, no murmur appreciated  RESPIRATORY: normal effort, CTAB  ABDOMEN: soft, non-tender throughout  MUSCULOSKELETAL: no deformity, no lower extremity edema, no calf tenderness bilaterally  NEURO: alert, moves all extremities, follows commands  SKIN: warm, dry    Vital signs and nursing notes reviewed.          LAB RESULTS  Recent Results (from the past 24 hour(s))   Comprehensive Metabolic Panel    Collection Time: 01/17/20 10:41 AM   Result Value Ref Range    Glucose 99 65 - 99 mg/dL    BUN 5 (L) 6 - 20 mg/dL    Creatinine 0.67 0.57 - 1.00 mg/dL    Sodium 138 136 - 145 mmol/L    Potassium 3.3 (L) 3.5 - 5.2 mmol/L    Chloride 95 (L) 98 - 107 mmol/L    CO2 30.7 (H) 22.0 - 29.0 mmol/L    Calcium 9.2 8.6 - 10.5 mg/dL    Total Protein 7.1 6.0 - 8.5 g/dL    Albumin 4.30 3.50 - 5.20 g/dL    ALT (SGPT) 7 1 - 33 U/L    AST (SGOT) 11 1 - 32 U/L    Alkaline Phosphatase 74 39 - 117 U/L    Total Bilirubin 0.3 0.2 - 1.2 mg/dL    eGFR Non African Amer 101 >60 mL/min/1.73    Globulin 2.8 gm/dL    A/G Ratio 1.5 g/dL    BUN/Creatinine Ratio 7.5 7.0 - 25.0    Anion Gap 12.3 5.0 - 15.0 mmol/L   hCG, Serum, Qualitative    Collection Time: 01/17/20 10:41 AM   Result Value Ref Range    HCG Qualitative Negative Negative   Troponin    Collection Time: 01/17/20 10:41 AM   Result Value Ref Range    Troponin T <0.010 0.000 - 0.030 ng/mL   BNP    Collection Time: 01/17/20 10:41 AM   Result Value Ref Range    proBNP 24.9 5.0 - 450.0 pg/mL   D-dimer, Quantitative    Collection Time: 01/17/20 10:41 AM   Result  Value Ref Range    D-Dimer, Quantitative 1.38 (H) 0.00 - 0.49 MCGFEU/mL   CBC Auto Differential    Collection Time: 01/17/20 10:41 AM   Result Value Ref Range    WBC 3.92 3.40 - 10.80 10*3/mm3    RBC 4.81 3.77 - 5.28 10*6/mm3    Hemoglobin 12.6 12.0 - 15.9 g/dL    Hematocrit 39.2 34.0 - 46.6 %    MCV 81.5 79.0 - 97.0 fL    MCH 26.2 (L) 26.6 - 33.0 pg    MCHC 32.1 31.5 - 35.7 g/dL    RDW 13.4 12.3 - 15.4 %    RDW-SD 39.4 37.0 - 54.0 fl    MPV 8.8 6.0 - 12.0 fL    Platelets 214 140 - 450 10*3/mm3    Neutrophil % 51.0 42.7 - 76.0 %    Lymphocyte % 38.3 19.6 - 45.3 %    Monocyte % 9.9 5.0 - 12.0 %    Eosinophil % 0.0 (L) 0.3 - 6.2 %    Basophil % 0.5 0.0 - 1.5 %    Immature Grans % 0.3 0.0 - 0.5 %    Neutrophils, Absolute 2.00 1.70 - 7.00 10*3/mm3    Lymphocytes, Absolute 1.50 0.70 - 3.10 10*3/mm3    Monocytes, Absolute 0.39 0.10 - 0.90 10*3/mm3    Eosinophils, Absolute 0.00 0.00 - 0.40 10*3/mm3    Basophils, Absolute 0.02 0.00 - 0.20 10*3/mm3    Immature Grans, Absolute 0.01 0.00 - 0.05 10*3/mm3    nRBC 0.0 0.0 - 0.2 /100 WBC       Ordered the above labs and reviewed the results.        RADIOLOGY  Ct Angiogram Chest    Result Date: 1/17/2020  CT ANGIOGRAM CHEST WITH CONTRAST, PULMONARY EMBOLISM PROTOCOL  HISTORY: Shortness of breath with chest discomfort.  TECHNIQUE: Spiral CT images were obtained from the lung apices to the diaphragmatic domes following administration of intravenous contrast in the angiographic phase. Coronal, sagittal and 3-D volume rendered reformats were then obtained.  COMPARISON: None.  FINDINGS: The pulmonary arteries are well opacified with intravenous contrast. There are no convincing filling defects to suggest pulmonary artery thromboembolism. No pleural or pericardial effusion is seen. No pathological mediastinal lymphadenopathy. The central airways are patent without endobronchial lesion. No suspicious pulmonary nodules or focal airspace disease is seen. There is a large sclerotic focus  seen within the T5 vertebral body possibly representing a bone island. The visualized upper abdomen is unremarkable. No pathological axillary lymphadenopathy.      No convincing evidence of pulmonary artery thromboembolism.  These findings were discussed with Dr. Bowser by telephone.  Radiation dose reduction techniques were utilized, including automated exposure control and exposure modulation based on body size.  This report was finalized on 1/17/2020 1:48 PM by Dr. Arturo Guallpa M.D.        Ordered the above noted radiological studies. Reviewed by me in PACS.          PROCEDURES  Procedures        EKG:           EKG time: 1031  Rhythm/Rate: NSR, rate of 61  P waves and AL: nml; nml  QRS, axis: nml; nml   ST and T waves: TWI in III     Interpreted Contemporaneously by me, independently viewed  Similar compared to prior from 10/09/2019.              MEDICATIONS GIVEN IN ER  Medications   sodium chloride 0.9 % flush 10 mL (has no administration in time range)   iopamidol (ISOVUE-370) 76 % injection 100 mL (100 mL Intravenous Given by Other 1/17/20 1215)                     MEDICAL DECISION MAKING and ED Course     1035 Ordered blood work and EKG for further evaluation.    1116 Due to the patient's elevated D-dimer at 1.38, ordered CTA Chest to r/o PE.    1144 Rechecked the patient who is resting comfortably and in NAD. Patient is stable. BP- 105/72 HR- 67 Temp- 96.7 °F (35.9 °C) (Tympanic) O2 sat- 99%. Informed the patient of her elevated D-dimer. Discussed the plan to check a CTA Chest to r/o a PE. Pt understands and agrees with the plan, all questions answered.    1240 Rechecked the patient who is resting comfortably and in NAD. Patient is stable. BP- 105/72 HR- 67 Temp- 96.7 °F (35.9 °C) (Tympanic) O2 sat- 99%. Informed the patient of her negative acute CTA Chest. Discussed the plan for discharge with instructions to f/u with her PMD for further evaluation and management. Strict RTER warnings given. Pt  understands and agrees with the plan, all questions answered.        MDM  Number of Diagnoses or Management Options  Shortness of breath:      Amount and/or Complexity of Data Reviewed  Clinical lab tests: ordered and reviewed (D-Dimer: 1.38, Troponin is <0.010, and Potassium: 3.3)  Tests in the radiology section of CPT®: ordered and reviewed (Ct Angiogram Chest    Result Date: 1/17/2020  Narrative: CT ANGIOGRAM CHEST WITH CONTRAST, PULMONARY EMBOLISM PROTOCOL  HISTORY: Shortness of breath with chest discomfort.  TECHNIQUE: Spiral CT images were obtained from the lung apices to the diaphragmatic domes following administration of intravenous contrast in the angiographic phase. Coronal, sagittal and 3-D volume rendered reformats were then obtained.  COMPARISON: None.  FINDINGS: The pulmonary arteries are well opacified with intravenous contrast. There are no convincing filling defects to suggest pulmonary artery thromboembolism. No pleural or pericardial effusion is seen. No pathological mediastinal lymphadenopathy. The central airways are patent without endobronchial lesion. No suspicious pulmonary nodules or focal airspace disease is seen. There is a large sclerotic focus seen within the T5 vertebral body possibly representing a bone island. The visualized upper abdomen is unremarkable. No pathological axillary lymphadenopathy.      Impression: No convincing evidence of pulmonary artery thromboembolism.  These findings were discussed with Dr. Bowser by telephone.  Radiation dose reduction techniques were utilized, including automated exposure control and exposure modulation based on body size.)  Tests in the medicine section of CPT®: reviewed and ordered (See procedure notes for EKG interpretation.)  Discussion of test results with the performing providers: yes (Dr. Guallpa (Radiology))  Decide to obtain previous medical records or to obtain history from someone other than the patient: yes  Review and summarize  past medical records: yes (The patient was seen by Neurology earlier today. The provider discussed with the patient today her concerns about her low oxygen levels as well as a possible PE. She recommended that the patient come to the ER for further evaluation.)  Independent visualization of images, tracings, or specimens: yes    Patient Progress  Patient progress: stable       34-year-old female with shortness of breath and atypical chest discomfort.  EKG without ischemic changes.  She is not hypoxic here.  Hypoxia in the clinic today was likely due to them trying to obtain it on her fingers where she has painted fingernails.  We placed in O2 sensor on the earlobe and her O2 saturations have been between 99 and 100%.  Cardiac troponin is negative.  D-dimer is elevated which is not surprising given recent craniotomy and also recent tooth extraction.  CTA chest was then obtained to further evaluate for possible pulmonary embolus and this appears normal.  She is appropriate for discharge home with return precautions, PCP follow-up.      DIAGNOSIS  Final diagnoses:   Shortness of breath         DISPOSITION  DISCHARGE    Patient discharged in stable condition.    Reviewed implications of results, diagnosis, meds, responsibility to follow up, warning signs and symptoms of possible worsening, potential complications and reasons to return to ER, including any new or worsening symptoms.    Patient/Family voiced understanding of above instructions.    Discussed plan for discharge, as there is no emergent indication for admission. Patient referred to primary care provider for BP management due to today's BP. Pt/family is agreeable and understands need for follow up and repeat testing.  Pt is aware that discharge does not mean that nothing is wrong but it indicates no emergency is present that requires admission and they must continue care with follow-up as given below or physician of their choice.     FOLLOW-UP  Guy Kelly,  MD  [address] [facility] [id number]  [phone]      As needed         Medication List      No changes were made to your prescriptions during this visit.                   Latest Documented Vital Signs:  As of 2:54 PM  BP- 106/71 HR- 53 Temp- 96.7 °F (35.9 °C) (Tympanic) O2 sat- 100%        --  Documentation assistance provided by marcia Ghosh for Dr. MENDOZA Bowser MD.  Information recorded by the scribe was done at my direction and has been verified and validated by me.      Please note that portions of this were completed with a voice recognition program.     Pat Ghosh  01/17/20 1823       Mayito Bowser MD  01/17/20 2036

## 2020-01-17 NOTE — PATIENT INSTRUCTIONS
Increase keppra to 750 mg twice daily.  Stop oxcarbazepine (trileptal).      **Avoid triggers    **Increase fluid intake to at least 2 liters (64 oz) per day.  Drinking a water bolus of 12-16 oz of very cold plain water quickly and prior to getting up may help even more.  Consider this up to three times per day.  Liquids that do not have caffeine rerecommended.      **Recognize prodrome and sit/lay down    **Increase salt to 10 grams daily.  High sodium foods include pickles, soy sauce, canned soups, deli meats, salted nuts or pumpkin seeds.  Caution is advised if the patient has high blood pressure or heart failure.      **Avoid medications causing low blood pressure    **Shift weight from side to side, leg crossing, heel raises, marching in place, squatting, bending over, sitting with head between the knees, lay down with legs raised    **Compression stockings (thigh high or waist high).  Should have at least 30-40 mmgHg of strength.  Garments that cover the abdomen and thighs are recommended.    **Cardiovascular exercise is recommended of at least 30 minutes on most days of the week.  Activities such as recumbent bicycling, rowing, or swimming are recommended.  Weight training should also be done at least 2-3 times per week and should concentrate on lower extremity exercises such as leg presses, toe presses and other exercises to improve leg strength.  Yoga and Pilates may also help.      **Sleep with head of bed at 30 degrees.

## 2020-01-17 NOTE — ED NOTES
Patient to er from Doctor's office with c/o low oxygen reading at the office. Patient reported some shortness of breath and chest pain for over week. Patient reported some nausea with this.      Rubin Nino RN  01/17/20 3255

## 2020-01-17 NOTE — PROGRESS NOTES
Subjective:     Patient ID: Desiree Peterson is a 34 y.o. female.    Ms. Peterson is a 34 year old female with a history of anxiety, depression, atrial flutter, meningioma status post resection, dumping syndrome, migraines, pots, and syncope who presents to the neurology clinic today as an established patient for follow-up for seizures.  The patient was last seen as a new patient to me on October 24, 2019.  She was previously followed by a neurologist in [place].  The patient reports that she had been passing out since around age 16.  It would occur if she was hot or she exerted herself.  She does feel lightheaded upon standing at least once a week.  Her symptoms improved in her 20s but in the fall of last year she had 4 episodes.  Before these episodes her head would spin and she did have a funny taste in her mouth and her ears would ring.  She did have loss of consciousness for a few minutes and witnesses said that she shook during these episodes.  When she comes to she has a feeling of falling.  She has had associated cheek biting and urinary incontinence.  She also reported staring spells for the past 3 years.  These occur several times a day and may last a few minutes and she has an associated sense of loss time.  The patient does not drive and she is not sexually active with men.  She is not on any oral contraceptive pills.  She was initially on Topamax but that was switched to oxcarbazepine.  Then Keppra was added back in October.  Other than her meningioma resection the patient has no major risk factors for seizures.  Her last MRI of the brain was done October 18.  Today when her vitals were checked her pulse ox was 81%.  After lying down for several minutes it was rechecked and still in the low 80s.  She denies a history of lung disorders.  She denies any fevers or cough.  She does report some chest pain for the last few days as well as some shortness of breath.  She also feels dizzy and  lightheaded.  Her last sodium was done on November 25 and was normal at 141.  She was in the epilepsy monitoring unit on November 11-14 at the [facility].  The EEG showed findings consistent with her resection but otherwise the background was normal and she did not have any spells.  She thinks she has had 2 passing out episodes since I last saw her.  Once in November and then once 2 days ago.  She also had 2 staring episodes yesterday.  She is currently taking Keppra 500 mg twice a day and Trileptal 300 mg twice a day.  Our plan was to titrate her up to 1000 twice a day of Keppra and to stop the oxcarbazepine.  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  She has not worked since June or July.  She reports that she has short-term disability paperwork.  She is unable to work because of the exhaustion.    The following portions of the patient's history were reviewed and updated as appropriate: allergies, current medications, past family history, past medical history, past social history, past surgical history and problem list.    Review of Systems   Constitutional: Positive for fatigue. Negative for fever.   Respiratory: Positive for shortness of breath. Negative for cough.    Cardiovascular: Positive for chest pain. Negative for leg swelling.   Neurological: Positive for dizziness, syncope and light-headedness.        Objective:    Neurologic Exam    Physical Exam   Constitutional:  Vital signs reviewed.  No apparent distress.  Well groomed.  Eyes:  No injection, no icterus.   Respiratory:  Normal effort.  Clear to auscultation bilaterally.  Cardiovascular:  Regular rate and rhythm.  No murmurs.    Skin:  No rashes.  Warm, dry, and intact.  Psychiatric:  Good mood.  Normal affect.    Neurologic:  Mental status-  The patient is alert and oriented.  Attention/concentration is within normal limits.  Speech is fluent without dysarthria.    Cranial nerves-   Smile symmetric.   Palate elevates symmetrically.   Tongue is midline.  Motor-  No focal weakness.  No tremor.  Gait- Steady    Assessment/Plan:  The patient is a 34-year-old female with history of anxiety, depression, atrial flutter, meningioma status post resection, dumping syndrome, migraines, pots, and syncope who presents to the neurology clinic today for follow-up for seizures.    1.  Seizures-is difficult to conclude if the patient is truly having epileptic seizures.  It is possible that her passing out is syncope from her dysautonomia.  She was in the epilepsy monitoring unit without any potentially epileptic activity or seizure activity for 4 days.  Unfortunately is quite difficult to prove seizures.  I recommend at this time to increase her Keppra to 750 mg twice a day and stop oxcarbazepine.  We also discussed things that can help with pots including exercise, increasing salt, and compression stockings.  I will also like to refer her to the [facility].    2.  Hypoxemia-I discussed with the patient today my concerns about her low oxygen levels as well as a possible PE.  The patient declined going to the emergency room.  I recommend for her to call her primary care physician as soon as possible.  I also sent a message to Dr. Kelly as well.    A total of 25 minutes of face-to-face time was spent the patient greater than 50% time was spent on counseling regarding her symptoms, test results, medication management.        Problems Addressed this Visit     None      Visit Diagnoses     Seizure (CMS/HCC)    -  Primary    Syncope and collapse        POTS (postural orthostatic tachycardia syndrome)        Dysautonomia (CMS/HCC)        Hypoxemia

## 2020-01-22 ENCOUNTER — TELEPHONE (OUTPATIENT)
Dept: FAMILY MEDICINE CLINIC | Facility: CLINIC | Age: 35
End: 2020-01-22

## 2020-01-22 NOTE — TELEPHONE ENCOUNTER
Appreciate the information.  I was informed that she would be going to [place] and I think that is a good idea.  Let her know if she needs to see me for anything to call.

## 2020-01-22 NOTE — TELEPHONE ENCOUNTER
PT WANTED DR. AGUILLON TO KNOW THAT SHE IS DOING WELL WITH HER BRAIN SURGERY RECOVERY, BUT THEY ARE TALKING ABOUT SENDING HER TO [place] FOR MORE STUDIES.  SHE WANTS TO SPEAK WITH DR. AGUILLON ABOUT DOING PHYSICAL THERAPY, BUT PROBABLY WHEN SHE GETS HOME FROM [place].  SAYS IF HENNA NEEDS HER TO MAKE AN APPT TO BE SEEN PRIOR TO ANY OF THIS, JUST LET HER KNOW

## 2020-01-27 ENCOUNTER — TELEPHONE (OUTPATIENT)
Dept: NEUROLOGY | Facility: CLINIC | Age: 35
End: 2020-01-27

## 2020-01-27 NOTE — TELEPHONE ENCOUNTER
I reached out to patient to inform her that we will need 3 sets of orthostatics before she can be scheduled at [facility]. She stated the soon as she can come in is Friday. I informed her that she will not need an appointment for me to do this.  She stated understanding.

## 2020-01-29 ENCOUNTER — TELEPHONE (OUTPATIENT)
Dept: FAMILY MEDICINE CLINIC | Facility: CLINIC | Age: 35
End: 2020-01-29

## 2020-02-03 ENCOUNTER — TELEPHONE (OUTPATIENT)
Dept: FAMILY MEDICINE CLINIC | Facility: CLINIC | Age: 35
End: 2020-02-03

## 2020-02-04 NOTE — TELEPHONE ENCOUNTER
Roger returned call and information was verified that he needed. He needed an estimated date for release for fmla. I told him to put a year. He also needed to see if she had a f/u appt. Confirmed we do not.

## 2020-04-02 ENCOUNTER — APPOINTMENT (OUTPATIENT)
Dept: MRI IMAGING | Facility: HOSPITAL | Age: 35
End: 2020-04-02

## 2020-04-10 ENCOUNTER — TELEPHONE (OUTPATIENT)
Dept: NEUROSURGERY | Facility: CLINIC | Age: 35
End: 2020-04-10

## 2020-04-10 NOTE — TELEPHONE ENCOUNTER
Patient is due for her MRI and follow up appointment. She would like to wait until this pandemic is over as she has not been in public. Is June Ok?

## 2020-04-13 ENCOUNTER — HOSPITAL ENCOUNTER (OUTPATIENT)
Dept: MRI IMAGING | Facility: HOSPITAL | Age: 35
End: 2020-04-13

## 2020-04-13 NOTE — TELEPHONE ENCOUNTER
YVONNE to call the office. I have moved her appointment to 7/21/2020 at 1200. If she calls back we just need to give her the appointment and remind her to get her MRI the week prior.

## 2020-05-04 ENCOUNTER — TELEPHONE (OUTPATIENT)
Dept: NEUROLOGY | Facility: CLINIC | Age: 35
End: 2020-05-04

## 2020-05-04 RX ORDER — LEVETIRACETAM 1000 MG/1
1000 TABLET ORAL 2 TIMES DAILY
Qty: 60 TABLET | Refills: 11 | Status: SHIPPED | OUTPATIENT
Start: 2020-05-04 | End: 2020-12-21 | Stop reason: SDUPTHER

## 2020-05-04 NOTE — TELEPHONE ENCOUNTER
Patient called and stated that she had 5 staring seizures in 2 days.  None today.  Has increase in stress.  Missed one dose of LEV last week.  On  mg BID.  Recommend increase to 1000 mg BID.

## 2020-05-11 ENCOUNTER — TELEPHONE (OUTPATIENT)
Dept: NEUROSURGERY | Facility: CLINIC | Age: 35
End: 2020-05-11

## 2020-05-11 RX ORDER — CLONAZEPAM 1 MG/1
1 TABLET ORAL 3 TIMES DAILY PRN
Qty: 60 TABLET | Refills: 1 | Status: SHIPPED | OUTPATIENT
Start: 2020-05-11 | End: 2020-09-30

## 2020-05-11 NOTE — TELEPHONE ENCOUNTER
EVERM to call the office, Her MRI was moved up to tomorrow she needs to arrive at [facility] to arrive at 0830. I have moved up her fup to Thursday at 2:00.    ----- Message from MYAH Kincaid sent at 5/8/2020  8:40 AM EDT -----  Regarding: please follow up  surgery in 10/2019 for meningioma, also reports has ch migraines and POTS. c/o feeling very deconditioned, tired and having headaches that began at surgical site. Reports having a scab at incision that has been there since surgery and not gone away- denies fever, chills, wound redness, swelling, or drainage. Was due for follow up MRI in April but postponed to June due to COVID. She does not feel she has a wound issue- states she did have a closure with glue so maybe retained glue. Please move up MRI and f/u to next week. Told to call if she is having any fever, chills, wound changes. Most of her symptoms seem related to POTS and she agrees.

## 2020-05-12 ENCOUNTER — HOSPITAL ENCOUNTER (OUTPATIENT)
Dept: MRI IMAGING | Facility: HOSPITAL | Age: 35
Discharge: HOME | End: 2020-05-12
Admitting: PHYSICIAN ASSISTANT

## 2020-05-12 DIAGNOSIS — Z09 SURGERY FOLLOW-UP EXAMINATION: ICD-10-CM

## 2020-05-12 PROCEDURE — 0 GADOBENATE DIMEGLUMINE 529 MG/ML SOLUTION: Performed by: PHYSICIAN ASSISTANT

## 2020-05-12 PROCEDURE — A9577 INJ MULTIHANCE: HCPCS | Performed by: PHYSICIAN ASSISTANT

## 2020-05-12 PROCEDURE — 70553 MRI BRAIN STEM W/O & W/DYE: CPT

## 2020-05-12 RX ADMIN — GADOBENATE DIMEGLUMINE 15 ML: 529 INJECTION, SOLUTION INTRAVENOUS at 09:15

## 2020-05-12 NOTE — PROGRESS NOTES
Subjective   History of Present Illness: Desiree Peterson is a 34 y.o. female is here today for follow-up with a New Brain MRI. She is almost 7 months out from a Right frontotemporal craniotomy with gross total removal of a right temporal meningioma done on 10/16/2019.    Today in the office she reports, fatigue, increased seizures and headaches. She reports her incision is scabbed over. Neurology increased her medication for seizures.      History of Present Illness     This patient returns today.  She is doing okay.  She has some other comorbidities that are keeping her down from the standpoint of her brain she feels all right.    The following portions of the patient's history were reviewed and updated as appropriate: allergies, current medications, past family history, past medical history, past social history, past surgical history and problem list.    Review of Systems   Constitutional: Positive for activity change and fatigue.   Eyes: Negative for visual disturbance.   Respiratory: Negative for chest tightness and shortness of breath.    Cardiovascular: Negative for chest pain.   Skin: Positive for wound.   Neurological: Positive for headaches.   All other systems reviewed and are negative.      Objective     Vitals:    05/14/20 1338   BP: 107/72   Pulse: 90   Temp: 97.4 °F (36.3 °C)     There is no height or weight on file to calculate BMI.      Physical Exam   Constitutional: She is oriented to person, place, and time. She appears well-developed and well-nourished.   Neurological: She is oriented to person, place, and time.     Neurologic Exam     Mental Status   Oriented to person, place, and time.     Cranial Nerves   There is a small area of scab formation in the upper part of her incision.  There is no drainage.           Assessment/Plan   Independent Review of Radiographic Studies:      I personally reviewed the images from the following studies.    I reviewed her MRI of the brain which was done on  12 May.  This shows no evidence of recurrent tumor at all.    Medical Decision Making:      I told the patient about the MRI.  I told her that from my point of view there is nothing else we need to do right now.  She does have a little scab in the middle of her incision but it is small and I think it will gradually reabsorb.  I told her I would like to check it again in about 3 months with a telephone visit.  Otherwise we will see her back in a year with another MRI.  Desiree was seen today for follow-up.    Diagnoses and all orders for this visit:    Meningioma (CMS/HCC)  -     MRI Brain With & Without Contrast; Future      Return in about 3 months (around 8/14/2020) for After radiology test.

## 2020-05-14 ENCOUNTER — OFFICE VISIT (OUTPATIENT)
Dept: NEUROSURGERY | Facility: CLINIC | Age: 35
End: 2020-05-14

## 2020-05-14 VITALS — SYSTOLIC BLOOD PRESSURE: 107 MMHG | DIASTOLIC BLOOD PRESSURE: 72 MMHG | TEMPERATURE: 97.4 F | HEART RATE: 90 BPM

## 2020-05-14 DIAGNOSIS — D32.9 MENINGIOMA (HCC): Primary | ICD-10-CM

## 2020-05-14 PROBLEM — Z09 SURGERY FOLLOW-UP EXAMINATION: Status: RESOLVED | Noted: 2019-11-04 | Resolved: 2020-05-14

## 2020-05-14 PROCEDURE — 99213 OFFICE O/P EST LOW 20 MIN: CPT | Performed by: NEUROLOGICAL SURGERY

## 2020-06-23 RX ORDER — VENLAFAXINE HYDROCHLORIDE 37.5 MG/1
CAPSULE, EXTENDED RELEASE ORAL
Qty: 30 CAPSULE | Refills: 5 | Status: SHIPPED | OUTPATIENT
Start: 2020-06-23 | End: 2020-12-08

## 2020-07-02 ENCOUNTER — TELEPHONE (OUTPATIENT)
Dept: NEUROSURGERY | Facility: CLINIC | Age: 35
End: 2020-07-02

## 2020-07-02 NOTE — TELEPHONE ENCOUNTER
Mackenzie Chaudhry from [employer] called for a peer to peer.  This is a 3rd party disability claim group.  [phone]

## 2020-07-02 NOTE — TELEPHONE ENCOUNTER
Called Mackenzie back.  She wants to know if the patient has any disabling diagnosis that would prevent her from working?    What restrictions does she have?    She had Right frontotemporal craniotomy with gross total removal of a right temporal meningioma done on 10/16/2019.

## 2020-08-12 NOTE — PROGRESS NOTES
Subjective   History of Present Illness: Desiree Peterson is a 34 y.o. female is here today via Telephone Visit for follow-up. Ms. Peterson had Right frontotemporal craniotomy with gross total removal of a right temporal meningioma done on 10/16/2019.      You have chosen to receive care through a telephone visit. Do you consent to use a telephone visit for your medical care today? Yes    We did a telephone visit today.  The patient was at home and I was in the office.  We talked for 5 minutes.    History of Present Illness     The patient is doing pretty well overall.  She does have some occasional headaches and some dizziness but for the most part is feeling okay.    The following portions of the patient's history were reviewed and updated as appropriate: allergies, current medications, past family history, past medical history, past social history, past surgical history and problem list.    Review of Systems   Constitutional: Positive for fatigue.   Eyes: Negative for visual disturbance.   Respiratory: Negative for chest tightness and shortness of breath.    Cardiovascular: Negative for chest pain.   Neurological: Positive for dizziness and headaches.       I have reviewed the review of systems as documented by my MA.      Objective         Physical Exam   Constitutional: She is oriented to person, place, and time.   Neurological: She is oriented to person, place, and time.     Neurologic Exam     Mental Status   Oriented to person, place, and time.           Assessment/Plan   Independent Review of Radiographic Studies:      I personally reviewed the images from the following studies.    I reviewed her MRI from May.  This shows no evidence of recurrent tumor.  The radiologist talks about some areas of encephalomalacia but I do not really think she has any of that either.    Medical Decision Making:      I told the patient I would just scan her again in May of next year.  She is in agreement with that plan.   I did offer her a scan now since some of her symptoms are new but I really do not think it would show anything different than was seen in May.  She agrees.  Desiree was seen today for follow-up.    Diagnoses and all orders for this visit:    Meningioma (CMS/HCC)  -     Cancel: MRI Brain With & Without Contrast; Future  -     MRI Brain With & Without Contrast; Future      Return in about 9 months (around 5/13/2021).

## 2020-08-13 ENCOUNTER — OFFICE VISIT (OUTPATIENT)
Dept: NEUROSURGERY | Facility: CLINIC | Age: 35
End: 2020-08-13

## 2020-08-13 DIAGNOSIS — D32.9 MENINGIOMA (HCC): Primary | ICD-10-CM

## 2020-08-13 PROCEDURE — 99441 PR PHYS/QHP TELEPHONE EVALUATION 5-10 MIN: CPT | Performed by: NEUROLOGICAL SURGERY

## 2020-08-24 ENCOUNTER — TELEPHONE (OUTPATIENT)
Dept: FAMILY MEDICINE CLINIC | Facility: CLINIC | Age: 35
End: 2020-08-24

## 2020-08-24 NOTE — TELEPHONE ENCOUNTER
PT IS REQUESTING A REFERRAL FOR DERMATOLOGY AND FOR CARDIOLOGIST. DOES NOT HAVE ANY PREFERENCE TO WHO SHE SEES WITH EITHER ONE

## 2020-08-25 DIAGNOSIS — R00.2 PALPITATION: ICD-10-CM

## 2020-08-25 DIAGNOSIS — D22.9 CHANGE IN MOLE: Primary | ICD-10-CM

## 2020-08-25 DIAGNOSIS — G90.A POTS (POSTURAL ORTHOSTATIC TACHYCARDIA SYNDROME): Primary | ICD-10-CM

## 2020-09-28 ENCOUNTER — LAB (OUTPATIENT)
Dept: LAB | Facility: HOSPITAL | Age: 35
End: 2020-09-28

## 2020-09-28 ENCOUNTER — OFFICE VISIT (OUTPATIENT)
Dept: CARDIOLOGY | Facility: CLINIC | Age: 35
End: 2020-09-28

## 2020-09-28 VITALS
HEIGHT: 67 IN | SYSTOLIC BLOOD PRESSURE: 98 MMHG | DIASTOLIC BLOOD PRESSURE: 66 MMHG | WEIGHT: 152 LBS | HEART RATE: 74 BPM | BODY MASS INDEX: 23.86 KG/M2

## 2020-09-28 DIAGNOSIS — R55 SYNCOPE AND COLLAPSE: ICD-10-CM

## 2020-09-28 DIAGNOSIS — R00.2 PALPITATIONS: ICD-10-CM

## 2020-09-28 DIAGNOSIS — G90.1 DYSAUTONOMIA (HCC): Primary | ICD-10-CM

## 2020-09-28 DIAGNOSIS — G90.1 DYSAUTONOMIA (HCC): ICD-10-CM

## 2020-09-28 LAB
ANION GAP SERPL CALCULATED.3IONS-SCNC: 8.5 MMOL/L (ref 5–15)
BUN SERPL-MCNC: 7 MG/DL (ref 6–20)
BUN/CREAT SERPL: 9.9 (ref 7–25)
CALCIUM SPEC-SCNC: 9.5 MG/DL (ref 8.6–10.5)
CHLORIDE SERPL-SCNC: 102 MMOL/L (ref 98–107)
CO2 SERPL-SCNC: 29.5 MMOL/L (ref 22–29)
CREAT SERPL-MCNC: 0.71 MG/DL (ref 0.57–1)
DEPRECATED RDW RBC AUTO: 40.8 FL (ref 37–54)
ERYTHROCYTE [DISTWIDTH] IN BLOOD BY AUTOMATED COUNT: 12.8 % (ref 12.3–15.4)
GFR SERPL CREATININE-BSD FRML MDRD: 95 ML/MIN/1.73
GLUCOSE SERPL-MCNC: 87 MG/DL (ref 65–99)
HCT VFR BLD AUTO: 42 % (ref 34–46.6)
HGB BLD-MCNC: 13.3 G/DL (ref 12–15.9)
MCH RBC QN AUTO: 27.3 PG (ref 26.6–33)
MCHC RBC AUTO-ENTMCNC: 31.7 G/DL (ref 31.5–35.7)
MCV RBC AUTO: 86.1 FL (ref 79–97)
PLATELET # BLD AUTO: 231 10*3/MM3 (ref 140–450)
PMV BLD AUTO: 9.1 FL (ref 6–12)
POTASSIUM SERPL-SCNC: 3.7 MMOL/L (ref 3.5–5.2)
RBC # BLD AUTO: 4.88 10*6/MM3 (ref 3.77–5.28)
SODIUM SERPL-SCNC: 140 MMOL/L (ref 136–145)
WBC # BLD AUTO: 5.01 10*3/MM3 (ref 3.4–10.8)

## 2020-09-28 PROCEDURE — 80048 BASIC METABOLIC PNL TOTAL CA: CPT

## 2020-09-28 PROCEDURE — 99204 OFFICE O/P NEW MOD 45 MIN: CPT | Performed by: INTERNAL MEDICINE

## 2020-09-28 PROCEDURE — 85027 COMPLETE CBC AUTOMATED: CPT

## 2020-09-28 PROCEDURE — 93000 ELECTROCARDIOGRAM COMPLETE: CPT | Performed by: INTERNAL MEDICINE

## 2020-09-28 PROCEDURE — 36415 COLL VENOUS BLD VENIPUNCTURE: CPT

## 2020-09-28 NOTE — PROGRESS NOTES
Subjective:     Encounter Date:09/28/2020      Patient ID: Desiree Peterson is a 34 y.o. female.    Chief Complaint: Pots  HPI:   This is a 34-year-old woman who has a past medical history of dysautonomia/POTS, migraines, anxiety/depression, seizures, meningioma status post resection.  She presents today for evaluation.  She was previously followed by cardiologists in various cities in [place] and [place].  She was diagnosed with dysautonomia/ postural orthostatic tachycardia syndrome in her teens.  She eventually grew out of it for most of her 20s, however in the last year has had recurrence of symptoms.  Her review of systems is diffusely positive, however in terms of her dysautonomia symptoms she complains of dizziness, frequent syncopal episodes upon standing, mental fog, fatigue, palpitations.  She was previously treated with Midrin, Florinef, salt tabs.  She did not tolerate Midrin well, however believes the Florinef and salt tabs worked okay.  She has had an echocardiogram in 2017 which was essentially normal.  She is single has 1 child quit smoking in 2019.  Her mother had some sort of undefined heart disease.    The following portions of the patient's history were reviewed and updated as appropriate: allergies, current medications, past family history, past medical history, past social history, past surgical history and problem list.     REVIEW OF SYSTEMS:   All systems reviewed.  Pertinent positives identified in HPI.  All other systems are negative.    Past Medical History:   Diagnosis Date   • Abnormal menses    • Anxiety and depression    • Atrial flutter (CMS/HCC)    • Bartholin cyst     X2   • Brain tumor (CMS/HCC)    • Cervical pain (neck)    • Cyst, sinus nasal    • Dumping syndrome    • Heart murmur    • History of anemia    • Meningioma (CMS/HCC)    • Mid back pain    • Migraines    • POTS (postural orthostatic tachycardia syndrome)    • Seizures (CMS/HCC)    • SOB (shortness of breath)     • Syncope and collapse    • Thyroid nodule    • Tinnitus of both ears        Family History   Problem Relation Age of Onset   • No Known Problems Mother    • No Known Problems Father    • Malig Hyperthermia Neg Hx        Social History     Socioeconomic History   • Marital status: Single     Spouse name: Not on file   • Number of children: 1   • Years of education: College   • Highest education level: Not on file   Occupational History   • Occupation: Unemployed   Tobacco Use   • Smoking status: Former Smoker     Packs/day: 1.00     Years: 3.00     Pack years: 3.00     Types: Cigarettes     Start date:      Quit date: 10/15/2019     Years since quittin.9   • Smokeless tobacco: Never Used   Substance and Sexual Activity   • Alcohol use: No   • Drug use: No   • Sexual activity: Defer       Allergies   Allergen Reactions   • Amoxicillin Rash   • Cefdinir Rash   • Penicillins Rash       Past Surgical History:   Procedure Laterality Date   • CRANIOTOMY FOR TUMOR Right 10/16/2019    Procedure: Right frontotemporal craniotomy for tumor;  Surgeon: Nas Coats MD;  Location: [facility];  Service: Neurosurgery   • FINGER SURGERY      Cyst removal         ECG 12 Lead    Date/Time: 2020 4:29 PM  Performed by: Heidi Flores MD  Authorized by: Heidi Flores MD   Comparison: compared with previous ECG from 2020  Similar to previous ECG  Rhythm: sinus rhythm  Rate: normal  Conduction: incomplete right bundle branch block  ST Segments: ST segments normal  T Waves: T waves normal  QRS axis: normal  Other: no other findings    Clinical impression: normal ECG               Objective:         PHYSICAL EXAM:  GEN: VSS, no distress,   Eyes: normal sclera, normal lids and lashes  HENT: moist mucus membranes,   Respiratory: CTAB, no rales or wheezes  CV: RRR, no murmurs, , +2 DP and 2+ carotid pulses b/l  GI: NABS, soft,  Nontender, nondistended  MSK: no edema, no scoliosis or kyphosis  Skin: no rash, warm,  dry, multiple tattoos heme/Lymph: no bruising or bleeding  Psych: organized thought, normal behavior and affect  Neuro: Cranial nerves grossly intact, Alert and Oriented x 3.         Assessment:          Diagnosis Plan   1. Dysautonomia (CMS/HCC)  Basic Metabolic Panel   2. Palpitations  CBC (No Diff)   3. Syncope and collapse            Plan:       1.  This is a 34-year-old woman who was previously diagnosed with dysautonomia and pots.  She was previously treated successfully with standard therapy.  I have encouraged her to increase her fluid intake and salt her foods heavily.  She did not tolerate Midodrine well when she was younger.  We will get labs today.  Her most recent labs from during January 2020 showed hypokalemia.  I still think she should be able to tolerate Florinef along with a potassium supplement.  We can add salt tabs as needed.  She should consider wearing compression hose/abdominal binder if her symptoms do not improve with medical therapy.    Dr. Kelly, thank you very much for referring this kind patient to me. Please call me with any questions or concerns. I will see the patient again in the office in 3 months.         Heidi Flores MD  09/28/20  [facility]    Outpatient Encounter Medications as of 9/28/2020   Medication Sig Dispense Refill   • clonazePAM (KlonoPIN) 1 MG tablet Take 1 tablet by mouth 3 (Three) Times a Day As Needed for Anxiety or Seizures. 60 tablet 1   • levETIRAcetam (KEPPRA) 1000 MG tablet Take 1 tablet by mouth 2 (Two) Times a Day. 60 tablet 11   • levothyroxine (SYNTHROID, LEVOTHROID) 75 MCG tablet Take 1 tablet by mouth Daily. 30 tablet 3   • venlafaxine XR (EFFEXOR-XR) 37.5 MG 24 hr capsule TAKE 1 CAPSULE BY MOUTH EVERY DAY AT NIGHT 30 capsule 5   • amphetamine-dextroamphetamine (ADDERALL) 10 MG tablet Take 3 tablets by mouth 2 (Two) Times a Day. 60 tablet 0   • [DISCONTINUED] acetaminophen-codeine (TYLENOL #3) 300-30 MG per tablet      • [DISCONTINUED]  clindamycin (CLEOCIN) 300 MG capsule      • [DISCONTINUED] ondansetron (ZOFRAN) 4 MG tablet Take 1 tablet by mouth Every 8 (Eight) Hours As Needed for Nausea or Vomiting. 30 tablet 1   • [DISCONTINUED] OXcarbazepine (TRILEPTAL) 300 MG tablet Take 300 mg by mouth 2 (Two) Times a Day.  1     No facility-administered encounter medications on file as of 9/28/2020.

## 2020-09-29 DIAGNOSIS — G90.A POTS (POSTURAL ORTHOSTATIC TACHYCARDIA SYNDROME): Primary | ICD-10-CM

## 2020-09-29 RX ORDER — FLUDROCORTISONE ACETATE 0.1 MG/1
0.1 TABLET ORAL 2 TIMES DAILY
Qty: 60 TABLET | Refills: 3 | Status: SHIPPED | OUTPATIENT
Start: 2020-09-29 | End: 2020-12-08

## 2020-09-29 RX ORDER — POTASSIUM CHLORIDE 20 MEQ/1
20 TABLET, EXTENDED RELEASE ORAL DAILY
Qty: 30 TABLET | Refills: 11 | Status: SHIPPED | OUTPATIENT
Start: 2020-09-29 | End: 2020-12-08

## 2020-09-30 RX ORDER — CLONAZEPAM 1 MG/1
TABLET ORAL
Qty: 60 TABLET | Refills: 0 | Status: SHIPPED | OUTPATIENT
Start: 2020-09-30 | End: 2020-11-04

## 2020-10-21 ENCOUNTER — OFFICE VISIT (OUTPATIENT)
Dept: FAMILY MEDICINE CLINIC | Facility: CLINIC | Age: 35
End: 2020-10-21

## 2020-10-21 VITALS
TEMPERATURE: 98.4 F | HEIGHT: 67 IN | RESPIRATION RATE: 18 BRPM | DIASTOLIC BLOOD PRESSURE: 60 MMHG | OXYGEN SATURATION: 90 % | WEIGHT: 154 LBS | BODY MASS INDEX: 24.17 KG/M2 | HEART RATE: 86 BPM | SYSTOLIC BLOOD PRESSURE: 118 MMHG

## 2020-10-21 DIAGNOSIS — D32.9 MENINGIOMA (HCC): ICD-10-CM

## 2020-10-21 DIAGNOSIS — R56.9 SEIZURES (HCC): Primary | ICD-10-CM

## 2020-10-21 DIAGNOSIS — G90.A POTS (POSTURAL ORTHOSTATIC TACHYCARDIA SYNDROME): ICD-10-CM

## 2020-10-21 DIAGNOSIS — Z86.69 HISTORY OF MIGRAINE HEADACHES: ICD-10-CM

## 2020-10-21 DIAGNOSIS — F41.9 ANXIETY: ICD-10-CM

## 2020-10-21 DIAGNOSIS — I48.92 ATRIAL FLUTTER, UNSPECIFIED TYPE (HCC): ICD-10-CM

## 2020-10-21 PROCEDURE — 99213 OFFICE O/P EST LOW 20 MIN: CPT | Performed by: FAMILY MEDICINE

## 2020-10-21 RX ORDER — MIDODRINE HYDROCHLORIDE 2.5 MG/1
2.5 TABLET ORAL 3 TIMES DAILY
COMMUNITY
Start: 2020-10-01 | End: 2020-12-08

## 2020-10-21 NOTE — PROGRESS NOTES
"SUBJECTIVE:  The patient is a 34-year-old white female.  She has multiple medical problems.  She has seizures, POTS, meningioma, atrial flutter, anxiety, depression, migraines, and tinnitus.  She was evaluated at [facility].  She is feeling much better with the exception of some of the POTS symptoms.    PAST MEDICAL HISTORY:  Reviewed.    REVIEW OF SYSTEMS:  Please see above.  All others reviewed and are negative.      OBJECTIVE:   /60 (BP Location: Left arm, Patient Position: Sitting)   Pulse 86   Temp 98.4 °F (36.9 °C) (Infrared)   Resp 18   Ht 170.2 cm (67\")   Wt 69.9 kg (154 lb)   SpO2 90%   BMI 24.12 kg/m²    Vitals signs are reviewed and are stable.    General:  Well-nourished.  Alert and oriented x3 in no acute distress.  HEENT: PERRLA.   Neck:  Supple.   Lungs:  Clear.    Heart:  Regular rate and rhythm.   Abdomen:   Soft, nontender.   Extremities:  No cyanosis, clubbing or edema.   Neurological:  Grossly intact without motor or sensory deficits.     ASSESSMENT:      Diagnoses and all orders for this visit:    1. Seizures (CMS/HCC) (Primary)    2. Meningioma (CMS/HCC)    3. POTS (postural orthostatic tachycardia syndrome)    4. Anxiety    5. Atrial flutter, unspecified type (CMS/HCC)    6. History of migraine headaches         PLAN:   Patient will obtain referral as recommended by [place] and we will process these once we have them.  She will call if any problems.    Dictated utilizing Dragon dictation.    "

## 2020-11-04 RX ORDER — CLONAZEPAM 1 MG/1
TABLET ORAL
Qty: 60 TABLET | Refills: 0 | Status: SHIPPED | OUTPATIENT
Start: 2020-11-04 | End: 2020-12-21 | Stop reason: SDUPTHER

## 2020-11-04 RX ORDER — LEVOTHYROXINE SODIUM 0.07 MG/1
TABLET ORAL
Qty: 30 TABLET | Refills: 0 | Status: SHIPPED | OUTPATIENT
Start: 2020-11-04 | End: 2020-12-21 | Stop reason: SDUPTHER

## 2020-11-05 ENCOUNTER — TELEPHONE (OUTPATIENT)
Dept: FAMILY MEDICINE CLINIC | Facility: CLINIC | Age: 35
End: 2020-11-05

## 2020-11-05 DIAGNOSIS — H93.19 TINNITUS, UNSPECIFIED LATERALITY: Primary | ICD-10-CM

## 2020-11-05 DIAGNOSIS — R42 DIZZINESS: ICD-10-CM

## 2020-11-05 NOTE — TELEPHONE ENCOUNTER
I have made one of the referrals and will send you names for the other two referrals.  If there is anyone else to refer to I am not sure what it is.

## 2020-11-05 NOTE — TELEPHONE ENCOUNTER
PATIENT CALLED TO FIND OUT IF DR AGUILLON HAS RECEIVED ANY RECORDS FROM [place] SO SHE CAN GET REFERRALS TO THE SPECIALISTS SHE NEEDS TO SEE.     PLEASE CALL AND ADVISE: [phone]

## 2020-11-06 NOTE — TELEPHONE ENCOUNTER
Left message on voicemail, we will refer to ENT she needs to call her insurance and see who to call for psych eval and dermatology she will need to call for those apts.

## 2020-11-09 ENCOUNTER — TELEPHONE (OUTPATIENT)
Dept: FAMILY MEDICINE CLINIC | Facility: CLINIC | Age: 35
End: 2020-11-09

## 2020-11-09 NOTE — TELEPHONE ENCOUNTER
If she is sure the medicine is what made her feel bad and she should probably not take it.  Sometimes it causes a problem to suddenly stop it.  If she can tolerate weaning off but that would be a better choice however if it is making her too sick and she should stop it

## 2020-11-12 ENCOUNTER — HOSPITAL ENCOUNTER (EMERGENCY)
Facility: HOSPITAL | Age: 35
Discharge: PSYCHIATRIC OR SUBSTANCE USE FACILITY | End: 2020-11-13
Attending: EMERGENCY MEDICINE | Admitting: EMERGENCY MEDICINE

## 2020-11-12 DIAGNOSIS — F32.A DEPRESSION WITH SUICIDAL IDEATION: Primary | ICD-10-CM

## 2020-11-12 DIAGNOSIS — S61.512A LACERATION OF LEFT WRIST, INITIAL ENCOUNTER: ICD-10-CM

## 2020-11-12 DIAGNOSIS — R45.851 DEPRESSION WITH SUICIDAL IDEATION: Primary | ICD-10-CM

## 2020-11-12 LAB
AMPHET+METHAMPHET UR QL: NEGATIVE
B PARAPERT DNA SPEC QL NAA+PROBE: NOT DETECTED
B PERT DNA SPEC QL NAA+PROBE: NOT DETECTED
BARBITURATES UR QL SCN: NEGATIVE
BENZODIAZ UR QL SCN: POSITIVE
C PNEUM DNA NPH QL NAA+NON-PROBE: NOT DETECTED
CANNABINOIDS SERPL QL: POSITIVE
COCAINE UR QL: NEGATIVE
ETHANOL BLD-MCNC: <10 MG/DL (ref 0–10)
ETHANOL UR QL: <0.01 %
FLUAV SUBTYP SPEC NAA+PROBE: NOT DETECTED
FLUBV RNA ISLT QL NAA+PROBE: NOT DETECTED
HADV DNA SPEC NAA+PROBE: NOT DETECTED
HCG SERPL QL: NEGATIVE
HCOV 229E RNA SPEC QL NAA+PROBE: NOT DETECTED
HCOV HKU1 RNA SPEC QL NAA+PROBE: NOT DETECTED
HCOV NL63 RNA SPEC QL NAA+PROBE: NOT DETECTED
HCOV OC43 RNA SPEC QL NAA+PROBE: NOT DETECTED
HMPV RNA NPH QL NAA+NON-PROBE: NOT DETECTED
HPIV1 RNA SPEC QL NAA+PROBE: NOT DETECTED
HPIV2 RNA SPEC QL NAA+PROBE: NOT DETECTED
HPIV3 RNA NPH QL NAA+PROBE: NOT DETECTED
HPIV4 P GENE NPH QL NAA+PROBE: NOT DETECTED
M PNEUMO IGG SER IA-ACNC: NOT DETECTED
METHADONE UR QL SCN: NEGATIVE
OPIATES UR QL: NEGATIVE
OXYCODONE UR QL SCN: NEGATIVE
RHINOVIRUS RNA SPEC NAA+PROBE: NOT DETECTED
RSV RNA NPH QL NAA+NON-PROBE: NOT DETECTED
SARS-COV-2 RNA NPH QL NAA+NON-PROBE: NOT DETECTED

## 2020-11-12 PROCEDURE — 80307 DRUG TEST PRSMV CHEM ANLYZR: CPT | Performed by: PHYSICIAN ASSISTANT

## 2020-11-12 PROCEDURE — 90791 PSYCH DIAGNOSTIC EVALUATION: CPT

## 2020-11-12 PROCEDURE — 90715 TDAP VACCINE 7 YRS/> IM: CPT | Performed by: PHYSICIAN ASSISTANT

## 2020-11-12 PROCEDURE — 99283 EMERGENCY DEPT VISIT LOW MDM: CPT

## 2020-11-12 PROCEDURE — 84703 CHORIONIC GONADOTROPIN ASSAY: CPT | Performed by: PHYSICIAN ASSISTANT

## 2020-11-12 PROCEDURE — 0202U NFCT DS 22 TRGT SARS-COV-2: CPT | Performed by: PHYSICIAN ASSISTANT

## 2020-11-12 PROCEDURE — 90471 IMMUNIZATION ADMIN: CPT | Performed by: PHYSICIAN ASSISTANT

## 2020-11-12 PROCEDURE — 25010000002 TDAP 5-2.5-18.5 LF-MCG/0.5 SUSPENSION: Performed by: PHYSICIAN ASSISTANT

## 2020-11-12 RX ORDER — LIDOCAINE HYDROCHLORIDE AND EPINEPHRINE 10; 10 MG/ML; UG/ML
10 INJECTION, SOLUTION INFILTRATION; PERINEURAL ONCE
Status: COMPLETED | OUTPATIENT
Start: 2020-11-12 | End: 2020-11-12

## 2020-11-12 RX ADMIN — LIDOCAINE HYDROCHLORIDE AND EPINEPHRINE 3 ML: 10; 10 INJECTION, SOLUTION INFILTRATION; PERINEURAL at 17:11

## 2020-11-12 RX ADMIN — TETANUS TOXOID, REDUCED DIPHTHERIA TOXOID AND ACELLULAR PERTUSSIS VACCINE, ADSORBED 0.5 ML: 5; 2.5; 8; 8; 2.5 SUSPENSION INTRAMUSCULAR at 17:10
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
## 2020-11-12 NOTE — ED PROVIDER NOTES
EMERGENCY DEPARTMENT ENCOUNTER    Room Number:  15/15  Date of encounter:  11/12/2020  PCP: Guy Kelly MD  Historian: Patient/      I used full protective equipment while examining this patient.  This includes face mask, gloves and protective eyewear.  I washed my hands before entering the room and immediately upon leaving the room      HPI:  Chief Complaint: Suicidal ideation, depression  A complete HPI/ROS/PMH/PSH/SH/FH are unobtainable due to: Nothing    Context: Desiree Peterson is a 34 y.o. female who presents to the ED c/o acute on chronic depression with recent suicidal ideation.  Patient states she has been dealing with depression for many years.  She recently stopped her Effexor several days ago.  She states initially she did well until she woke up this morning.  Patient states she woke up this morning and had severe depression.  She states she cut her left wrist in efforts to kill herself.  She also has several self-inflicted lacerations to her left anterior thigh.  She denies any ingestion.  She states at this time she still feels suicidal.  She denies any recent illicit drug or alcohol usage.    Review of Medical Records  I reviewed patient's last family medicine visit from 10/21/2020.  Patient being treated for depression, anxiety, seizures.    PAST MEDICAL HISTORY  Active Ambulatory Problems     Diagnosis Date Noted   • Paresthesia 08/02/2018   • Atypical migraine 08/02/2018   • Migraine aura without headache 08/02/2018   • Cervical radiculopathy 08/02/2018   • Migraine with aura, not intractable, without status migrainosus 01/24/2017   • Meningioma (CMS/HCC) 01/21/2017     Resolved Ambulatory Problems     Diagnosis Date Noted   • Meningioma (CMS/HCC) 03/21/2017   • Hypokalemia 08/02/2018   • Cerebral meningioma (CMS/HCC) 08/02/2018   • Surgery follow-up examination 11/04/2019     Past Medical History:   Diagnosis Date   • Abnormal menses    • Anxiety and depression    • Atrial flutter  (CMS/HCC)    • Bartholin cyst    • Brain tumor (CMS/HCC)    • Cervical pain (neck)    • Cyst, sinus nasal    • Dumping syndrome    • Heart murmur    • History of anemia    • Mid back pain    • Migraines    • POTS (postural orthostatic tachycardia syndrome)    • Seizures (CMS/HCC)    • SOB (shortness of breath)    • Syncope and collapse    • Thyroid nodule    • Tinnitus of both ears          PAST SURGICAL HISTORY  Past Surgical History:   Procedure Laterality Date   • CRANIOTOMY FOR TUMOR Right 10/16/2019    Procedure: Right frontotemporal craniotomy for tumor;  Surgeon: Nas Coats MD;  Location: [facility] OR;  Service: Neurosurgery   • FINGER SURGERY      Cyst removal         FAMILY HISTORY  Family History   Problem Relation Age of Onset   • No Known Problems Mother    • No Known Problems Father    • Malig Hyperthermia Neg Hx          SOCIAL HISTORY  Social History     Socioeconomic History   • Marital status: Single     Spouse name: Not on file   • Number of children: 1   • Years of education: College   • Highest education level: Not on file   Occupational History   • Occupation: Unemployed   Tobacco Use   • Smoking status: Former Smoker     Packs/day: 1.00     Years: 3.00     Pack years: 3.00     Types: Cigarettes     Start date:      Quit date: 10/15/2019     Years since quittin.0   • Smokeless tobacco: Never Used   Substance and Sexual Activity   • Alcohol use: No   • Drug use: No   • Sexual activity: Defer         ALLERGIES  Amoxicillin, Cefdinir, and Penicillins        REVIEW OF SYSTEMS  All systems reviewed and negative except for those discussed in HPI.       PHYSICAL EXAM    I have reviewed the triage vital signs and nursing notes.    ED Triage Vitals [20 1618]   Temp Heart Rate Resp BP SpO2   97.2 °F (36.2 °C) 111 -- -- 97 %      Temp src Heart Rate Source Patient Position BP Location FiO2 (%)   Tympanic -- -- -- --       Physical Exam  GENERAL: Alert, oriented, not distressed  HENT:  head atraumatic, no nuchal rigidity  EYES: no scleral icterus, EOMI  CV: regular rhythm, regular rate, no murmur  RESPIRATORY: normal effort, CTA  ABDOMEN: soft, nontender  MUSCULOSKELETAL: Laceration to left anterior wrist.  Neurovascular intact distally.  No tendon injury.  Multiple superficial lacerations to anterior left thigh.  NEURO: alert, moves all extremities, follows commands  PSYCH: Flat affect, depressed  SKIN: warm, dry        LAB RESULTS  Recent Results (from the past 24 hour(s))   hCG, Serum, Qualitative    Collection Time: 11/12/20  4:57 PM    Specimen: Blood   Result Value Ref Range    HCG Qualitative Negative Negative   Ethanol    Collection Time: 11/12/20  4:57 PM    Specimen: Blood   Result Value Ref Range    Ethanol <10 0 - 10 mg/dL    Ethanol % <0.010 %   Urine Drug Screen - Urine, Clean Catch    Collection Time: 11/12/20  5:03 PM    Specimen: Urine, Clean Catch   Result Value Ref Range    Amphet/Methamphet, Screen Negative Negative    Barbiturates Screen, Urine Negative Negative    Benzodiazepine Screen, Urine Positive (A) Negative    Cocaine Screen, Urine Negative Negative    Opiate Screen Negative Negative    THC, Screen, Urine Positive (A) Negative    Methadone Screen, Urine Negative Negative    Oxycodone Screen, Urine Negative Negative       Ordered the above labs and independently reviewed the results.      PROCEDURES    Laceration Repair    Date/Time: 11/12/2020 7:33 PM  Performed by: Janak Pickering PA  Authorized by: Nas Strong MD     Consent:     Consent obtained:  Verbal    Consent given by:  Patient  Anesthesia (see MAR for exact dosages):     Anesthesia method:  Local infiltration    Local anesthetic:  Lidocaine 1% WITH epi  Laceration details:     Location:  Hand    Hand location:  L wrist    Length (cm):  2  Repair type:     Repair type:  Simple  Pre-procedure details:     Preparation:  Patient was prepped and draped in usual sterile fashion  Exploration:     Hemostasis  achieved with:  Epinephrine    Wound exploration: wound explored through full range of motion and entire depth of wound probed and visualized      Wound extent: no foreign bodies/material noted, no nerve damage noted and no tendon damage noted    Treatment:     Area cleansed with:  Hibiclens    Amount of cleaning:  Standard    Irrigation solution:  Sterile saline  Skin repair:     Repair method:  Sutures    Suture size:  5-0    Suture material:  Nylon    Suture technique:  Running    Number of sutures:  5  Approximation:     Approximation:  Close  Post-procedure details:     Dressing:  Antibiotic ointment and non-adherent dressing    Patient tolerance of procedure:  Tolerated well, no immediate complications          MEDICATIONS GIVEN IN ER    Medications   lidocaine-EPINEPHrine (XYLOCAINE W/EPI) 1 %-1:[id number] injection 10 mL (3 mL Injection Given 11/12/20 1711)   Tdap (BOOSTRIX) injection 0.5 mL (0.5 mL Intramuscular Given 11/12/20 1710)         PROGRESS, DATA ANALYSIS, CONSULTS, AND MEDICAL DECISION MAKING    All labs have been independently reviewed by me.  All radiology studies have been reviewed by me and discussed with radiologist dictating the report.   EKG's independently viewed and interpreted by me.  Discussion below represents my analysis of pertinent findings related to patient's condition, differential diagnosis, treatment plan and final disposition.    I have discussed case with Dr. Strong, emergency room physician.  He has performed his own bedside examination and agrees with treatment plan.    ED Course as of Nov 12 1942   Thu Nov 12, 2020   1644 Patient presents with suicidal ideation, worsening depression, as well as self-inflicted left wrist laceration.  Patient placed on 72-hour hold.  We will have access evaluate patient    [EE]   1857 HCG Qualitative: Negative [EE]   1857 Ethanol: <10 [EE]   1857 I discussed the patient with Naheed nurse with the access department.  They will admit the  patient.  Patient is on a hold.  The behavioral health unit is reportedly full.  They will look for a bed in an outlying facility.    [EE]   1942 Care turned over to Dr. Strong pending placement.    [EE]      ED Course User Index  [EE] Janak Pickering PA       AS OF 19:42 EST VITALS:    BP - 93/57  HR - 111  TEMP - 97.2 °F (36.2 °C) (Tympanic)  O2 SATS - 97%        DIAGNOSIS  Final diagnoses:   Depression with suicidal ideation   Laceration of left wrist, initial encounter         DISPOSITION  Pending           Janak Pickering PA  11/12/20 1942

## 2020-11-12 NOTE — ED TRIAGE NOTES
"Patient to er with family at side with c/o \" she tried to kill to herself by cutting her wrist.\" family reported she has been off her antidepressant meds for 4 days. Patient has mask on in triage along with staff.   "

## 2020-11-13 VITALS
WEIGHT: 154 LBS | BODY MASS INDEX: 24.17 KG/M2 | SYSTOLIC BLOOD PRESSURE: 95 MMHG | HEIGHT: 67 IN | DIASTOLIC BLOOD PRESSURE: 59 MMHG | OXYGEN SATURATION: 98 % | RESPIRATION RATE: 18 BRPM | HEART RATE: 89 BPM | TEMPERATURE: 98.1 F

## 2020-11-13 NOTE — CONSULTS
"AC consulted to see pt regarding suicidal thoughts.    Pt is a 34 year old female who came to ED with her brother after she cut her wrist in an attempt to commit suicide. Per chart, pt has been off her medication the past few days. Pt has a hx of seizures. BAL negative. UDS positive for benzodiazepines and THC.    Pt seen in ED 15. Pt is lying in stretcher covered in blankets. She appears fatigued but is agreeable to speak with this RN. Pt states that she lives with her 14 year old daughter. Pt's  committed suicide in 2015. She states she currently has a girlfriend who she has been seeing for 8 months. Pt has been on disability since 2015.     Pt states that she has been diagnosed with POTS and dysautonomia since she was 14 years old. In 2015 she was found to have a brain tumor and began to have seizures. Pt had brain surgery to remove the tumor in 2019. She continues to experience seizures. Pt states that she was started on Effexor by her PCP in 2015. Pt states that recently she has not been feeling well while taking Effexor and her PCP recommended that she discontinue it. Pt states that she stopped taking it 3 days ago and since then has been \"all over the place\". Pt states that her mood has been \"uncontrollable\" and she doesn't know why. She states that she has been having negative intrusive thoughts that she can't get rid of.     Pt states that this morning she woke up extremely agitated. She states that she couldn't control her mood and began \"manically posting depressive things\" on social media. She states she has had suicidal thoughts in the past but \"nothing like this\". She states that the SI was intrusive and she felt that she had to harm herself. Pt impulsively took a razor blade and cut her L wrist. She will require stiches. She states that she also began \"hitting\" her leg with the razor in order to harm herself. Pt denies previous suicide attempts. She states that this episode \"scared me pretty " "bad\" but she is still experiencing SI. She states \"I can't guarantee that I'm safe day to day\" and feels that she needs help.     Pt states she was seen by [facility] years ago and was diagnosed with ADHD. She states that they told her that her ADHD scores were so high that she may be bipolar but she never had further testing done. She was also diagnosed with anxiety and depression at that time. She has never seen a psychiatrist or had inpt/outpt mental health tx. She has seen a counselor in the past but does not see one currently. Pt is prescribed Adderall, which she states she does not take, and klonopin PRN. Pt denies hallucinations. Pt denies alcohol use. She vapes daily. She states that she smokes pot once a week.     With pt's permission, this RN spoke with pt's brother, Sher Peterson outside of the room. He states that he believes pt requires inpatient admission as this impulsive behavior is out of the norm for pt. He states that she has been very depressed and talking about needing to get help recently.    Discussed case with psychiatrist Dr. Robledo. He states that d/t pt's impulsive behavior he would recommend that pt be admitted for further treatment. There are currently no [facility] beds available. Will look for placement at other facilities. Discussed with AREN Le, who is in agreement with plan.   "

## 2020-11-13 NOTE — NURSING NOTE
Spoke with Marva in intake at [facility] re possibly transferring patient. Marva states she wants COVID 19 results prior to discussing transfer.

## 2020-11-13 NOTE — NURSING NOTE
Patient accepted in transfer to [facility] by Dr. Robledo. Gave report to Gabrielle marshall. Patient is going to [address] [place] [address], bed 1.

## 2020-11-13 NOTE — ED NOTES
Pt ambulatory c steady gait to stretcher, AAOx4, ABC's intact, NAD noted at this time. Pt discharged c belongings to facility via [facility]. PPE worn by this RN c pt wearing mask during encounters.      Bubba Salcido, RN  11/13/20 0052

## 2020-12-02 ENCOUNTER — TELEPHONE (OUTPATIENT)
Dept: NEUROLOGY | Facility: CLINIC | Age: 35
End: 2020-12-02

## 2020-12-03 ENCOUNTER — TELEPHONE (OUTPATIENT)
Dept: NEUROLOGY | Facility: CLINIC | Age: 35
End: 2020-12-03

## 2020-12-03 ENCOUNTER — TELEPHONE (OUTPATIENT)
Dept: FAMILY MEDICINE CLINIC | Facility: CLINIC | Age: 35
End: 2020-12-03

## 2020-12-03 DIAGNOSIS — R52 PAIN: Primary | ICD-10-CM

## 2020-12-03 NOTE — TELEPHONE ENCOUNTER
PT CALLED WANTING TO RESCHEDULE HOWEVER WHEN GIVEN NEW DATE DECIDED TO STAY WITH UPCOMING APPT . PT COMPLAINS OF UNBEARABLE PAIN IN BOTH HANDS UP IN TO THE ELBOW . PT IS UNABLE TO HOLD ANYTHING . PLEASE ADVISE   PLEASE CONTACT PT @   Desiree Peterson (Self) [phone] (E)

## 2020-12-03 NOTE — TELEPHONE ENCOUNTER
I do not see any EMGs in our system.  Can you asked the patient if she has had an EMG to confirm carpal tunnel syndrome?

## 2020-12-03 NOTE — TELEPHONE ENCOUNTER
PER THE PROVIDERS AT [place], PT NEEDS [facility] AT [place].    [phone]    STOPPED EFFEXOR AND HAD PROBLEMS WITH HER BIPOLAR DISORDER AND IS NOW TAKING ABILIFY.  CURRENT DOING OUT PATIENT PROGRAM AT [facility].

## 2020-12-03 NOTE — TELEPHONE ENCOUNTER
"I am sending this as a separate \"call patient\" note since I could not figure out how to send it off of the message that I got in my task basket.    I usually send patients to Dr. Allen or associate who is with [facility] and they have an office in our building.  Alternative out at [place] would be Dr. Bernal.  They may want and electrodiagnostic study perhaps she has had a previous study.    GNS  "

## 2020-12-04 NOTE — TELEPHONE ENCOUNTER
Attempted to call pt again. In order for Dr. Chaudhari to appropriately refer pt out for carpal tunnel issues/pain we need to know if she has had an EMG study to confirm. No answer. Left detailed msg with information. If pt does not call back with info we will discuss it at appt next week.

## 2020-12-08 ENCOUNTER — TELEPHONE (OUTPATIENT)
Dept: NEUROLOGY | Facility: CLINIC | Age: 35
End: 2020-12-08

## 2020-12-08 NOTE — TELEPHONE ENCOUNTER
Spoke to representative at Dr. Elaine's office in [place]. She is going to fax over EMG results.     [phone]

## 2020-12-09 ENCOUNTER — TELEMEDICINE (OUTPATIENT)
Dept: NEUROLOGY | Facility: CLINIC | Age: 35
End: 2020-12-09

## 2020-12-09 DIAGNOSIS — G25.81 RESTLESS LEGS SYNDROME (RLS): Primary | ICD-10-CM

## 2020-12-09 DIAGNOSIS — G56.03 BILATERAL CARPAL TUNNEL SYNDROME: ICD-10-CM

## 2020-12-09 DIAGNOSIS — R56.9 SEIZURES (HCC): ICD-10-CM

## 2020-12-09 DIAGNOSIS — G47.33 OBSTRUCTIVE SLEEP APNEA: ICD-10-CM

## 2020-12-09 DIAGNOSIS — G47.10 HYPERSOMNIA: ICD-10-CM

## 2020-12-09 DIAGNOSIS — G47.61 PERIODIC LIMB MOVEMENTS OF SLEEP: ICD-10-CM

## 2020-12-09 PROCEDURE — 99215 OFFICE O/P EST HI 40 MIN: CPT | Performed by: PSYCHIATRY & NEUROLOGY

## 2020-12-09 RX ORDER — RIZATRIPTAN BENZOATE 5 MG/1
5 TABLET, ORALLY DISINTEGRATING ORAL ONCE AS NEEDED
Qty: 12 TABLET | Refills: 5 | Status: SHIPPED | OUTPATIENT
Start: 2020-12-09 | End: 2021-08-23

## 2020-12-09 NOTE — PROGRESS NOTES
Subjective   Desiree Peterson is a 35 y.o. female.     I performed this clinical encounter by utilizing a real-time telehealth video connection between my location and the patient's location at home.  I obtained verbal consent from the patient to perform this clinical encounter utilizing video and prepared the patient by answering any questions they had about the telehealth interaction.    CC:  Seizures    HPI: Patient is a 35-year-old female with a history of anxiety, depression, atrial flutter, meningioma status post resection, dumping syndrome, migraines, pots, and syncope who presents to the neurology clinic today as an established patient for follow-up for seizures.  The patient was last seen on January 17, 2020.  She was a new patient back in October.  She was previously seen by neurologist in [place].  She had been passing out since the age of 16.  She then started having episodes concerning for generalized tonic-clonic seizures as well as some staring spells for the past 3 years.  She is initially on Topamax and that was switched to oxcarbazepine and then she has been on Keppra.  She is currently on 1000 g twice a day.  She was in the epilepsy monitoring unit for 4 days about a year ago that was normal.  Due to POTS she was seen at the [facility] back in October.  Recently her antidepressant was abruptly stopped and she had a suicide attempt.  She was inpatient for a while.  She was diagnosed with bipolar disorder.  She is now on olanzapine.  She is also doing group therapy every morning.  She has been diagnosed with chronic fatigue and was prescribed Adderall but she is not currently taking it.  She is not currently driving.  She reports that she has problems sleeping.  She has difficulty falling and staying asleep.  She has never had a sleep study.  It sounds like she may have some symptoms of restless legs.  We can check a ferritin today.  She denies any caffeine or alcohol.   They also mention possibly doing a repeat epilepsy monitoring unit stay due to her staring spells.  She says she zones out multiple times per day and is generally triggered by stress.  She had an upper extremity EMG done in June of last year that showed mild carpal tunnel syndrome bilaterally.  She was getting injections.  She reports that she is due for them and would like a referral.    [place] sleepiness scale:   1. Sitting and reading? 3  2. Watching TV? 2  3. Sitting inactive in a public place? 3  4. As a passenger in a car for an hour without a break? 1  5. Lying down to rest in the afternoon when able? 3  6. Sitting and talking to someone? 1  7. Sitting quietly after lunch without EtOH? 3  8. In a car while stopped for a few minutes in traffic? 2  Total: 18    Sleep habits:  When does the patient physically put themselves in the bed? 9 pm  How long does it take to fall asleep? 2 hours  What time do they wake up?  (naturally or with alarm?) 6 am  Do they wake in the middle of their sleep period? 3-4 times   What wakes them up?  How many times? Not sure  Feel refreshed in the morning? no  Naps/dozing off? 2-3/week for 1-2 hours  What do they do when they cannot fall asleep? Will toss and turn, legs feel like she has energy and arms are tingling    Apnea:  Gasping- not getting enough air  Witnessed pauses- no  Night sweats- yes  AM HA- yes  Snoring- not usually  FHx- dad (maybe mom)    Narcolepsy:  Sleep paralysis- yes  Hallucinations- no  Cataplexy- maybe with staring episodes, is out of it, triggered by stress.  Are short naps refreshing? More irritable with shorter naps    Gets migraines once per week.  Never took a preventive.  Had some sort of ODT med as an abortive.  Used to see Dr. Harrison.         The following portions of the patient's history were reviewed and updated as appropriate: allergies, current medications, past family history, past medical history, past social history, past surgical history and  problem list.    Review of Systems    Objective   Physical Exam      Assessment/Plan   Problems Addressed this Visit     None      Visit Diagnoses     Restless legs syndrome (RLS)    -  Primary    Relevant Orders    Ferritin    Polysomnography 4 or More Parameters    Hypersomnia        Relevant Orders    Polysomnography 4 or More Parameters    Obstructive sleep apnea        Relevant Orders    Polysomnography 4 or More Parameters    Periodic limb movements of sleep        Relevant Orders    Polysomnography 4 or More Parameters    Seizures (CMS/HCC)        Relevant Orders    EEG Continuous Monitoring With Video      Diagnoses       Codes Comments    Restless legs syndrome (RLS)    -  Primary ICD-10-CM: G25.81  ICD-9-CM: 333.94     Hypersomnia     ICD-10-CM: G47.10  ICD-9-CM: 780.54     Obstructive sleep apnea     ICD-10-CM: G47.33  ICD-9-CM: 327.23     Periodic limb movements of sleep     ICD-10-CM: G47.61  ICD-9-CM: 327.51     Seizures (CMS/HCC)     ICD-10-CM: R56.9  ICD-9-CM: 780.39         The patient is a 35-year-old female with a history of anxiety, depression, atrial flutter, meningioma, dumping syndrome, migraines, pots, and syncope who presents today for follow-up.    1.  Seizures-fortunately the patient has not had any more convulsions on levetiracetam monotherapy.  She continues to have staring spells multiple times a day.  I agree with repeat epilepsy monitoring unit evaluation for spell characterization.  Since she continues to have these episodes while on her medication, it could likely be continued.    2.  Sleep disturbances-the patient would like to referral to Dr. Apodaca for cognitive behavioral therapy for insomnia.  Due to possible restless legs I will check a ferritin level.  We can also do a sleep study due to concerns of sleep disordered breathing, periodic limbs of sleep, and hypersomnia.  We decided not to do an MSLT as she is not interested in wake promoting agents that might affect her heart  rate due to her history of POTS.    3.  Migraines-we can restart topiramate 50 mg at night as well as Maxalt as needed.  It may be a good idea for her to follow-up with Dr. Harrison for further management.    A total of 45 minutes of face-to-face time was spent with the patient greater than 50% time was spent on counseling regarding her symptoms and plan of care.

## 2020-12-09 NOTE — PATIENT INSTRUCTIONS
• At night, only use the bed and bedroom for sleep and sex only. Do not read, watch TV, eat, or worry in bed.   • Lie down only when you are sleepy.   • If you are unable to fall asleep, get up and go to another room. Avoid stimulating activities if you do get up.   • No clocks (if you tend to look at the clock throughout the night) or TV (or other electronic screens) in the bedroom.   • Avoid screens (TV, computer, tablet, cell phone) the hour prior to bedtime and during your sleep period.   • Establish a regular bedtime routine and a regular sleep/wake schedule. Keep this schedule 7 days a week.   • Do not eat or drink close to bedtime.   • Make sure your bedroom is dark, cool, and comfortable.   • If you need background noise, use a fan or a white noise machine.   • Avoid caffeine (regular coffee, decaf coffee, tea, sodas, chocolate, energy drinks).   • Avoid alcohol and nicotine close to bedtime.   • Exercise during the day, but not within 3 hours of bedtime.   • Avoid naps.   • Check if any of your night time medications may cause sleep problems.   • If you have heart burn or indigestion at night: avoid eating close to bedtime; elevated your head of bed; avoid spicy foods, tomatoes, citrus, alcohol, caffeine, and mint at night; take your antacid at night; sleep on your left side.   • If you have nasal stuffiness or congestion: consider taking an over the counter allergy medicine such as zyrtec, claritin, or allegra at night as well as a nasal spray such as Flonase or Nasacort.   • If you gasp for air at night, stop breathing in your sleep, have night sweats, snore, unrefreshing sleep, feel tired during the day, have morning headaches or dry mouth in the morning, you may be at risk for having problems with your breathing at night, likely sleep apnea. You may want to talk to your doctor about these symptoms.   • Consider trying melatonin at night. This can be purchased over the counter without a prescription.  I  "recommend doses between 0.5-3 mg.  Try [employer], [employer], Life Extension (on Amazon) or REM Fresh brands.    • Consider the Night Owl Sleep  liz or CBT-I (Cognitive behavioral therapy for insomnia)  for your smart device.    **Maintain a stable daily schedule (going to sleep and waking up the same time each day; eating regular meals; maintaining a low stress lifestyle)   **Consider massage, yoga, or Álvaro Ar    **150 minutes of moderate intensity aerobic exercise per week.    **Keep a headache diary to track headache frequency, patterns, and potential triggers (food?)  Try the \"Migraine Morales\" ap for your smart phone.    **Drink at least 64 ounces of water a day (2 liters or 2,000 mL).    **Avoid caffeine (Try going caffeine free for 2-3 months)    **Avoid taking NSAIDs (advil, alleve, etc) >15 time per month and avoid using pain medications >10 times per month)    **Check out DawnBuse.com and the [employer] Migraine foundation's website    **Side effects of triptans include:  Tingling, warmth, flushing, chest discomfort, and dizziness.    "

## 2020-12-12 ENCOUNTER — DOCUMENTATION (OUTPATIENT)
Dept: NEUROLOGY | Facility: CLINIC | Age: 35
End: 2020-12-12

## 2020-12-12 NOTE — PROGRESS NOTES
We received the patient's [place] autonomic consult that was done on October 1, 2020.  They diagnosed the patient with POTS.  They recommended water, abdominal binder, salt tabs, and Midrin.  They also recommended a psychiatrist to taper off of venlafaxine and a sleep study to rule out sleep apnea.  They also recommended ENT evaluation due to tinnitus and diagnosed her with cholinergic urticaria.

## 2020-12-15 ENCOUNTER — TRANSCRIBE ORDERS (OUTPATIENT)
Dept: SLEEP MEDICINE | Facility: HOSPITAL | Age: 35
End: 2020-12-15

## 2020-12-15 DIAGNOSIS — Z01.818 OTHER SPECIFIED PRE-OPERATIVE EXAMINATION: Primary | ICD-10-CM

## 2020-12-21 RX ORDER — CLONAZEPAM 1 MG/1
1 TABLET ORAL 3 TIMES DAILY PRN
Qty: 60 TABLET | Refills: 0 | Status: SHIPPED | OUTPATIENT
Start: 2020-12-21 | End: 2022-02-24 | Stop reason: SDUPTHER

## 2020-12-21 RX ORDER — LEVOTHYROXINE SODIUM 0.07 MG/1
75 TABLET ORAL DAILY
Qty: 30 TABLET | Refills: 3 | Status: SHIPPED | OUTPATIENT
Start: 2020-12-21 | End: 2021-03-29

## 2020-12-21 RX ORDER — LEVETIRACETAM 1000 MG/1
1000 TABLET ORAL 2 TIMES DAILY
Qty: 60 TABLET | Refills: 11 | Status: SHIPPED | OUTPATIENT
Start: 2020-12-21 | End: 2021-10-11 | Stop reason: SDUPTHER

## 2020-12-21 RX ORDER — OLANZAPINE 5 MG/1
5 TABLET ORAL NIGHTLY
Qty: 30 TABLET | Refills: 3 | Status: SHIPPED | OUTPATIENT
Start: 2020-12-21 | End: 2021-04-21

## 2020-12-21 NOTE — TELEPHONE ENCOUNTER
Caller: CoyDesiree ramirez    Relationship: Self    Best call back number: [phone]    Medication needed:   Requested Prescriptions     Pending Prescriptions Disp Refills   • levETIRAcetam (KEPPRA) 1000 MG tablet 60 tablet 11     Sig: Take 1 tablet by mouth 2 (Two) Times a Day.   • levothyroxine (SYNTHROID, LEVOTHROID) 75 MCG tablet 30 tablet 0     Sig: Take 1 tablet by mouth Daily.   • OLANZapine (zyPREXA) 5 MG tablet        Sig: Take 1 tablet by mouth Every Night.   • clonazePAM (KlonoPIN) 1 MG tablet 60 tablet 0     Sig: Take 1 tablet by mouth 3 (Three) Times a Day As Needed for Anxiety or Seizures.       When do you need the refill by: ASAP    What details did the patient provide when requesting the medication: LESS THAN 3 DAYS REMAINING ON ALL OF THE ABOVE MEDICATIONS    Does the patient have less than a 3 day supply:  [x] Yes  [] No    What is the patient's preferred pharmacy: [id number] - [place], [place] - [address] [facility] [phone] [facility] [phone]

## 2020-12-22 ENCOUNTER — TELEPHONE (OUTPATIENT)
Dept: NEUROLOGY | Facility: CLINIC | Age: 35
End: 2020-12-22

## 2020-12-22 ENCOUNTER — OFFICE VISIT (OUTPATIENT)
Dept: GASTROENTEROLOGY | Facility: CLINIC | Age: 35
End: 2020-12-22

## 2020-12-22 VITALS — TEMPERATURE: 97.4 F | BODY MASS INDEX: 25.83 KG/M2 | WEIGHT: 164.6 LBS | HEIGHT: 67 IN

## 2020-12-22 DIAGNOSIS — R12 HEARTBURN: ICD-10-CM

## 2020-12-22 DIAGNOSIS — R68.81 EARLY SATIETY: ICD-10-CM

## 2020-12-22 DIAGNOSIS — K62.5 RECTAL BLEEDING: ICD-10-CM

## 2020-12-22 DIAGNOSIS — R13.19 ESOPHAGEAL DYSPHAGIA: ICD-10-CM

## 2020-12-22 DIAGNOSIS — R19.4 CHANGE IN BOWEL HABITS: ICD-10-CM

## 2020-12-22 DIAGNOSIS — R10.13 EPIGASTRIC PAIN: Primary | ICD-10-CM

## 2020-12-22 DIAGNOSIS — Z74.09 MOBILITY IMPAIRED: Primary | ICD-10-CM

## 2020-12-22 PROCEDURE — 99204 OFFICE O/P NEW MOD 45 MIN: CPT | Performed by: NURSE PRACTITIONER

## 2020-12-22 RX ORDER — PANTOPRAZOLE SODIUM 20 MG/1
20 TABLET, DELAYED RELEASE ORAL DAILY
Qty: 30 TABLET | Refills: 3 | Status: SHIPPED | OUTPATIENT
Start: 2020-12-22 | End: 2021-08-21

## 2020-12-22 NOTE — PROGRESS NOTES
Chief Complaint   Patient presents with   • Multiple GI complaints     HPI    Desiree Peterson is a  35 y.o. female here to establish care as a new patient for multiple GI complaints.  Patient has a past medical history of anxiety, depression, bipolar disorder, brain tumor, dumping syndrome, POTS, and seizures.  She will also follow with Dr. Saini.    On visit today she has a number of GI complaints.  She has frequent dyspeptic symptoms, somewhat vague descriptions of esophageal dysphagia, frequent early satiety, more rarely nausea.  No vomiting.  No weight loss.  Her current BMI is 25.78 and stable.  She has occasional epigastric pain described as an aching burning sensation that comes and goes.    She goes on to complain of episodes of diarrhea that occur several times a week in between she will pass formed stools that are small-volume piecemeal like.  She denies lower abdominal pain or rectal pain.  She is occasionally experiences rectal bleeding with bright red blood on the toilet paper which she attributes to internal hemorrhoids.    She does not smoke.  She does not drink alcohol.  She avoids NSAIDs.  She denies family history of colon cancer.    Past Medical History:   Diagnosis Date   • Abnormal menses    • Anxiety and depression    • Atrial flutter (CMS/HCC)    • Bartholin cyst     X2   • Bipolar affective disorder, current episode mixed (CMS/HCC) 11/20/2020   • Brain tumor (CMS/HCC)    • Cervical pain (neck)    • Cyst, sinus nasal    • Dumping syndrome    • Heart murmur    • History of anemia    • Meningioma (CMS/HCC)    • Mid back pain    • Migraines    • POTS (postural orthostatic tachycardia syndrome)    • Seizures (CMS/HCC)    • SOB (shortness of breath)    • Syncope and collapse    • Thyroid nodule    • Tinnitus of both ears        Past Surgical History:   Procedure Laterality Date   • CRANIOTOMY FOR TUMOR Right 10/16/2019    Procedure: Right frontotemporal craniotomy for tumor;  Surgeon: Pauly  Nas DONALDSON MD;  Location: [facility] OR;  Service: Neurosurgery   • FINGER SURGERY      Cyst removal       Scheduled Meds:  Outpatient Encounter Medications as of 2020   Medication Sig Dispense Refill   • clonazePAM (KlonoPIN) 1 MG tablet Take 1 tablet by mouth 3 (Three) Times a Day As Needed for Anxiety or Seizures. 60 tablet 0   • levETIRAcetam (KEPPRA) 1000 MG tablet Take 1 tablet by mouth 2 (Two) Times a Day. 60 tablet 11   • levothyroxine (SYNTHROID, LEVOTHROID) 75 MCG tablet Take 1 tablet by mouth Daily. 30 tablet 3   • OLANZapine (zyPREXA) 5 MG tablet Take 1 tablet by mouth Every Night. 30 tablet 3   • rizatriptan MLT (MAXALT-MLT) 5 MG disintegrating tablet Place 1 tablet on the tongue 1 (One) Time As Needed for Migraine for up to 30 days. May repeat in 2 hours if needed 12 tablet 5   • pantoprazole (Protonix) 20 MG EC tablet Take 1 tablet by mouth Daily. 30 tablet 3     No facility-administered encounter medications on file as of 2020.        Continuous Infusions:No current facility-administered medications for this visit.       PRN Meds:.    Allergies   Allergen Reactions   • Amoxicillin Rash   • Cefdinir Rash   • Penicillins Rash       Social History     Socioeconomic History   • Marital status: Single     Spouse name: Not on file   • Number of children: 1   • Years of education: College   • Highest education level: Not on file   Occupational History   • Occupation: Unemployed   Tobacco Use   • Smoking status: Former Smoker     Packs/day: 1.00     Years: 3.00     Pack years: 3.00     Types: Cigarettes     Start date:      Quit date: 10/15/2019     Years since quittin.2   • Smokeless tobacco: Never Used   • Tobacco comment: vaping    Substance and Sexual Activity   • Alcohol use: No   • Drug use: No   • Sexual activity: Defer       Family History   Problem Relation Age of Onset   • No Known Problems Mother    • Kidney cancer Father    • No Known Problems Brother    • Malig Hyperthermia  Neg Hx        Review of Systems   Constitutional: Negative for activity change, appetite change, fatigue, fever and unexpected weight change.   HENT: Positive for trouble swallowing. Negative for voice change.    Eyes: Negative.    Respiratory: Negative for apnea, cough, choking, chest tightness, shortness of breath and wheezing.    Cardiovascular: Negative for chest pain, palpitations and leg swelling.   Gastrointestinal: Positive for abdominal pain, anal bleeding, diarrhea and nausea. Negative for abdominal distention, blood in stool, constipation, rectal pain and vomiting.   Endocrine: Negative.    Genitourinary: Negative.    Musculoskeletal: Negative.    Skin: Negative.    Allergic/Immunologic: Negative.    Neurological: Negative.    Hematological: Negative.    Psychiatric/Behavioral: Negative.        Vitals:    12/22/20 1410   Temp: 97.4 °F (36.3 °C)       Physical Exam  Constitutional:       Appearance: She is well-developed.   HENT:      Head: Normocephalic.      Nose: Nose normal.   Eyes:      Pupils: Pupils are equal, round, and reactive to light.   Neck:      Musculoskeletal: Normal range of motion.   Cardiovascular:      Rate and Rhythm: Normal rate and regular rhythm.      Heart sounds: Normal heart sounds.   Pulmonary:      Effort: Pulmonary effort is normal. No respiratory distress.      Breath sounds: Normal breath sounds. No wheezing.   Abdominal:      General: Bowel sounds are normal. There is no distension.      Palpations: Abdomen is soft. There is no mass.      Tenderness: There is no abdominal tenderness. There is no guarding.      Hernia: No hernia is present.   Musculoskeletal: Normal range of motion.   Skin:     General: Skin is warm and dry.      Capillary Refill: Capillary refill takes less than 2 seconds.   Neurological:      Mental Status: She is alert and oriented to person, place, and time.   Psychiatric:         Behavior: Behavior normal.       No radiology results for the last 7  days    Diagnoses and all orders for this visit:    1. Epigastric pain (Primary)  -     CBC & Differential  -     Comprehensive Metabolic Panel  -     TSH  -     Celiac Comprehensive Panel  -     Case Request; Standing  -     Obtain Informed Consent; Standing  -     Verify Bowel Prep Was Successful; Standing  -     Give Tap Water Enema If Bowel Prep Insufficient; Standing  -     Case Request    2. Early satiety  -     Case Request; Standing  -     Obtain Informed Consent; Standing  -     Verify Bowel Prep Was Successful; Standing  -     Give Tap Water Enema If Bowel Prep Insufficient; Standing  -     Case Request    3. Heartburn  -     Case Request; Standing  -     Obtain Informed Consent; Standing  -     Verify Bowel Prep Was Successful; Standing  -     Give Tap Water Enema If Bowel Prep Insufficient; Standing  -     Case Request    4. Change in bowel habits  -     CBC & Differential  -     Comprehensive Metabolic Panel  -     TSH  -     Celiac Comprehensive Panel  -     Case Request; Standing  -     Obtain Informed Consent; Standing  -     Verify Bowel Prep Was Successful; Standing  -     Give Tap Water Enema If Bowel Prep Insufficient; Standing  -     Case Request    5. Rectal bleeding  -     Case Request; Standing  -     Obtain Informed Consent; Standing  -     Verify Bowel Prep Was Successful; Standing  -     Give Tap Water Enema If Bowel Prep Insufficient; Standing  -     Case Request    6. Esophageal dysphagia    Other orders  -     pantoprazole (Protonix) 20 MG EC tablet; Take 1 tablet by mouth Daily.  Dispense: 30 tablet; Refill: 3  -     Celiac Comprehensive Panel    Assessment/plan    Pleasant 35-year-old female seen today to establish care as a new patient for a number of GI complaints.    Given her constellation of symptoms recommend bidirectional endoscopic evaluation with Dr. Saini.    Start Protonix 20 mg once daily.   Update labs today to include CBC, CMP, celiac panel, and TSH.    Consider gastric  emptying study in the future.    Follow-up and further recommendations pending the aforementioned work-up.

## 2020-12-22 NOTE — TELEPHONE ENCOUNTER
Unfortunately Dr. Bernal is not in insurance network. Is there any there hand surgeon that  would like to send patient.

## 2020-12-23 ENCOUNTER — TELEPHONE (OUTPATIENT)
Dept: FAMILY MEDICINE CLINIC | Facility: CLINIC | Age: 35
End: 2020-12-23

## 2020-12-23 ENCOUNTER — LAB (OUTPATIENT)
Dept: FAMILY MEDICINE CLINIC | Facility: CLINIC | Age: 35
End: 2020-12-23

## 2020-12-23 DIAGNOSIS — M25.50 POLYARTHRALGIA: Primary | ICD-10-CM

## 2020-12-23 LAB
ALBUMIN SERPL-MCNC: 3.9 G/DL (ref 3.5–5.2)
ALBUMIN/GLOB SERPL: 1.5 G/DL
ALP SERPL-CCNC: 58 U/L (ref 39–117)
ALT SERPL-CCNC: 11 U/L (ref 1–33)
AST SERPL-CCNC: 10 U/L (ref 1–32)
BASOPHILS # BLD AUTO: 0.03 10*3/MM3 (ref 0–0.2)
BASOPHILS NFR BLD AUTO: 0.6 % (ref 0–1.5)
BILIRUB SERPL-MCNC: 0.3 MG/DL (ref 0–1.2)
BUN SERPL-MCNC: 8 MG/DL (ref 6–20)
BUN/CREAT SERPL: 13.6 (ref 7–25)
CALCIUM SERPL-MCNC: 8.6 MG/DL (ref 8.6–10.5)
CHLORIDE SERPL-SCNC: 105 MMOL/L (ref 98–107)
CHROMATIN AB SERPL-ACNC: <10 IU/ML (ref 0–14)
CO2 SERPL-SCNC: 30.4 MMOL/L (ref 22–29)
CREAT SERPL-MCNC: 0.59 MG/DL (ref 0.57–1)
CRP SERPL-MCNC: 0.15 MG/DL (ref 0–0.5)
ENDOMYSIUM IGA SER QL: NEGATIVE
EOSINOPHIL # BLD AUTO: 0.01 10*3/MM3 (ref 0–0.4)
EOSINOPHIL NFR BLD AUTO: 0.2 % (ref 0.3–6.2)
ERYTHROCYTE [DISTWIDTH] IN BLOOD BY AUTOMATED COUNT: 12.8 % (ref 12.3–15.4)
ERYTHROCYTE [SEDIMENTATION RATE] IN BLOOD: 13 MM/HR (ref 0–20)
GLIADIN PEPTIDE IGA SER-ACNC: 3 UNITS (ref 0–19)
GLIADIN PEPTIDE IGG SER-ACNC: 4 UNITS (ref 0–19)
GLOBULIN SER CALC-MCNC: 2.6 GM/DL
GLUCOSE SERPL-MCNC: 87 MG/DL (ref 65–99)
HCT VFR BLD AUTO: 33.1 % (ref 34–46.6)
HGB BLD-MCNC: 10.7 G/DL (ref 12–15.9)
IGA SERPL-MCNC: 134 MG/DL (ref 87–352)
IMM GRANULOCYTES # BLD AUTO: 0.01 10*3/MM3 (ref 0–0.05)
IMM GRANULOCYTES NFR BLD AUTO: 0.2 % (ref 0–0.5)
LYMPHOCYTES # BLD AUTO: 1.48 10*3/MM3 (ref 0.7–3.1)
LYMPHOCYTES NFR BLD AUTO: 28.7 % (ref 19.6–45.3)
MCH RBC QN AUTO: 28 PG (ref 26.6–33)
MCHC RBC AUTO-ENTMCNC: 32.3 G/DL (ref 31.5–35.7)
MCV RBC AUTO: 86.6 FL (ref 79–97)
MONOCYTES # BLD AUTO: 0.33 10*3/MM3 (ref 0.1–0.9)
MONOCYTES NFR BLD AUTO: 6.4 % (ref 5–12)
NEUTROPHILS # BLD AUTO: 3.29 10*3/MM3 (ref 1.7–7)
NEUTROPHILS NFR BLD AUTO: 63.9 % (ref 42.7–76)
NRBC BLD AUTO-RTO: 0 /100 WBC (ref 0–0.2)
PLATELET # BLD AUTO: 216 10*3/MM3 (ref 140–450)
POTASSIUM SERPL-SCNC: 3.9 MMOL/L (ref 3.5–5.2)
PROT SERPL-MCNC: 6.5 G/DL (ref 6–8.5)
RBC # BLD AUTO: 3.82 10*6/MM3 (ref 3.77–5.28)
SODIUM SERPL-SCNC: 142 MMOL/L (ref 136–145)
TSH SERPL DL<=0.005 MIU/L-ACNC: 1.32 UIU/ML (ref 0.27–4.2)
TTG IGA SER-ACNC: <2 U/ML (ref 0–3)
TTG IGG SER-ACNC: <2 U/ML (ref 0–5)
URATE SERPL-MCNC: 3.7 MG/DL (ref 2.4–5.7)
WBC # BLD AUTO: 5.15 10*3/MM3 (ref 3.4–10.8)

## 2020-12-23 PROCEDURE — 86140 C-REACTIVE PROTEIN: CPT | Performed by: FAMILY MEDICINE

## 2020-12-23 PROCEDURE — 86431 RHEUMATOID FACTOR QUANT: CPT | Performed by: FAMILY MEDICINE

## 2020-12-23 PROCEDURE — 85652 RBC SED RATE AUTOMATED: CPT | Performed by: FAMILY MEDICINE

## 2020-12-23 PROCEDURE — 84550 ASSAY OF BLOOD/URIC ACID: CPT | Performed by: FAMILY MEDICINE

## 2020-12-23 PROCEDURE — 36415 COLL VENOUS BLD VENIPUNCTURE: CPT | Performed by: NURSE PRACTITIONER

## 2020-12-23 NOTE — TELEPHONE ENCOUNTER
WOULD LIKE A REF TO A RHEUMATOLOGIST , THEY WASN'T TO SEE IF SHE HAS AN AUTO IMMUNE DISEASE PER PATIENT.    Ben Mackenzie)  Orthopaedic Surgery; Sports Medicine  [address] [place] [address]  Phone: [phone]  Fax: [phone]  Follow Up Time:

## 2020-12-23 NOTE — PROGRESS NOTES
Let Lisbeth know that her lab work shows a mild anemia with a hemoglobin 10.7.  Lets have her come back for a TIBC and ferritin to see what her iron stores look like.  Normal kidney, liver, thyroid function.    Next steps are EGD and colonoscopy which she should have scheduled on her most recent office visit.

## 2020-12-23 NOTE — TELEPHONE ENCOUNTER
I have ordered labs.  You should have labs before I can refer you to a rheumatologist.  They have been ordered.

## 2020-12-23 NOTE — TELEPHONE ENCOUNTER
PATIENT IS CALLING NEEDING A CALL BACK FROM DR. AGUILLON NURSE    PATIENT IS CALLING WITH QUESTIONS ABOUT MEDICATIONS     PLEASE CONTACT PATIENT @[phone]

## 2020-12-23 NOTE — TELEPHONE ENCOUNTER
Per Dr Kelly wean off zyprexa by tasking 1/2 po daily x 5 days then qod x 5 and follow up with psych when he is available.  If sx worsen go back to psych ER where she was last month

## 2020-12-23 NOTE — TELEPHONE ENCOUNTER
I would suggest maybe weaning off this medication and see if that can get you through the holidays.  If you feel like you are having a significant problem you should go to the psychiatric emergency room

## 2020-12-24 LAB
ANA SER QL: NEGATIVE
ENDOMYSIUM IGA SER QL: NEGATIVE
GLIADIN PEPTIDE IGA SER-ACNC: 3 UNITS (ref 0–19)
GLIADIN PEPTIDE IGG SER-ACNC: 3 UNITS (ref 0–19)
IGA SERPL-MCNC: 139 MG/DL (ref 87–352)
TTG IGA SER-ACNC: <2 U/ML (ref 0–3)
TTG IGG SER-ACNC: <2 U/ML (ref 0–5)

## 2020-12-26 ENCOUNTER — LAB (OUTPATIENT)
Dept: LAB | Facility: HOSPITAL | Age: 35
End: 2020-12-26

## 2020-12-26 DIAGNOSIS — Z01.818 OTHER SPECIFIED PRE-OPERATIVE EXAMINATION: ICD-10-CM

## 2020-12-26 LAB — SARS-COV-2 RNA RESP QL NAA+PROBE: NOT DETECTED

## 2020-12-26 PROCEDURE — C9803 HOPD COVID-19 SPEC COLLECT: HCPCS

## 2020-12-26 PROCEDURE — U0004 COV-19 TEST NON-CDC HGH THRU: HCPCS

## 2020-12-28 ENCOUNTER — HOSPITAL ENCOUNTER (OUTPATIENT)
Dept: SLEEP MEDICINE | Facility: HOSPITAL | Age: 35
Discharge: HOME | End: 2020-12-28
Admitting: PSYCHIATRY & NEUROLOGY

## 2020-12-28 ENCOUNTER — TELEPHONE (OUTPATIENT)
Dept: NEUROLOGY | Facility: CLINIC | Age: 35
End: 2020-12-28

## 2020-12-28 DIAGNOSIS — G47.33 OBSTRUCTIVE SLEEP APNEA: ICD-10-CM

## 2020-12-28 DIAGNOSIS — G47.61 PERIODIC LIMB MOVEMENTS OF SLEEP: ICD-10-CM

## 2020-12-28 DIAGNOSIS — G47.10 HYPERSOMNIA: ICD-10-CM

## 2020-12-28 DIAGNOSIS — G25.81 RESTLESS LEGS SYNDROME (RLS): ICD-10-CM

## 2020-12-28 PROCEDURE — 95810 POLYSOM 6/> YRS 4/> PARAM: CPT

## 2020-12-28 PROCEDURE — 95810 POLYSOM 6/> YRS 4/> PARAM: CPT | Performed by: PSYCHIATRY & NEUROLOGY

## 2020-12-28 NOTE — TELEPHONE ENCOUNTER
PT IS CALLING TO INFORM DR. MONTEIRO THAT THE HAND SPECIALIST PT WAS REFERRED TO FOR HER CARPAL TUNNEL HAS INFORMED HER THAT THEY DO NOT TAKE PT'S INSURANCE AND WOULD BE SELF PAY. PT WAS WONDERING IF DR. MONTEIRO COULD REFER HER TO A HAND SPECIALIST THAT WAS IN PT'S INSURANCE NETWORK.    PT CAN BE REACHED AT [phone].    PLEASE ADVISE.

## 2020-12-28 NOTE — TELEPHONE ENCOUNTER
I think that I have already gotten this message.  This would be for Ginette.  I will cc her to this message.

## 2020-12-28 NOTE — TELEPHONE ENCOUNTER
Do you have any additional provider recommendations for referral to be sent somewhere else?    Please review and advise.   Thank you

## 2021-01-08 ENCOUNTER — TELEPHONE (OUTPATIENT)
Dept: FAMILY MEDICINE CLINIC | Facility: CLINIC | Age: 36
End: 2021-01-08

## 2021-01-08 DIAGNOSIS — Z74.09 IMMOBILITY: Primary | ICD-10-CM

## 2021-01-08 NOTE — TELEPHONE ENCOUNTER
Caller: Desiree Peterson    Relationship: Self    Best call back number: [phone]    What orders are you requesting (i.e. lab or imaging): OCCUPATIONAL THERAPY      Where will you receive your lab/imaging services: [facility]    Additional notes: PATIENT STATES SHE STARTS [facility] NEXT WEEK.  PHYSCIAL THERAPIST WANT HER TO ALSO DO OCCUPATIONAL THERAPY FOR MOBILITY OF HAND, KNEE AND HIP. PATIENT WOULD LIKE ORDERS SENT TO [facility].

## 2021-01-21 ENCOUNTER — TRANSCRIBE ORDERS (OUTPATIENT)
Dept: LAB | Facility: HOSPITAL | Age: 36
End: 2021-01-21

## 2021-01-21 DIAGNOSIS — Z01.818 OTHER SPECIFIED PRE-OPERATIVE EXAMINATION: Primary | ICD-10-CM

## 2021-01-26 ENCOUNTER — LAB (OUTPATIENT)
Dept: LAB | Facility: HOSPITAL | Age: 36
End: 2021-01-26

## 2021-01-26 DIAGNOSIS — Z01.818 OTHER SPECIFIED PRE-OPERATIVE EXAMINATION: ICD-10-CM

## 2021-01-26 PROCEDURE — C9803 HOPD COVID-19 SPEC COLLECT: HCPCS

## 2021-01-26 PROCEDURE — U0004 COV-19 TEST NON-CDC HGH THRU: HCPCS

## 2021-01-27 LAB — SARS-COV-2 RNA RESP QL NAA+PROBE: NOT DETECTED

## 2021-01-28 ENCOUNTER — ANESTHESIA EVENT (OUTPATIENT)
Dept: GASTROENTEROLOGY | Facility: HOSPITAL | Age: 36
End: 2021-01-28

## 2021-01-28 ENCOUNTER — HOSPITAL ENCOUNTER (OUTPATIENT)
Facility: HOSPITAL | Age: 36
Setting detail: HOSPITAL OUTPATIENT SURGERY
Discharge: HOME | End: 2021-01-28
Attending: INTERNAL MEDICINE | Admitting: INTERNAL MEDICINE

## 2021-01-28 ENCOUNTER — ANESTHESIA (OUTPATIENT)
Dept: GASTROENTEROLOGY | Facility: HOSPITAL | Age: 36
End: 2021-01-28

## 2021-01-28 VITALS
HEIGHT: 67 IN | DIASTOLIC BLOOD PRESSURE: 65 MMHG | HEART RATE: 66 BPM | BODY MASS INDEX: 27.47 KG/M2 | WEIGHT: 175 LBS | RESPIRATION RATE: 16 BRPM | SYSTOLIC BLOOD PRESSURE: 107 MMHG | OXYGEN SATURATION: 100 %

## 2021-01-28 DIAGNOSIS — R10.13 EPIGASTRIC PAIN: ICD-10-CM

## 2021-01-28 DIAGNOSIS — R12 HEARTBURN: ICD-10-CM

## 2021-01-28 DIAGNOSIS — R68.81 EARLY SATIETY: ICD-10-CM

## 2021-01-28 DIAGNOSIS — R19.4 CHANGE IN BOWEL HABITS: ICD-10-CM

## 2021-01-28 DIAGNOSIS — K62.5 RECTAL BLEEDING: ICD-10-CM

## 2021-01-28 LAB
B-HCG UR QL: NEGATIVE
INTERNAL NEGATIVE CONTROL: NEGATIVE
INTERNAL POSITIVE CONTROL: POSITIVE
Lab: NORMAL

## 2021-01-28 PROCEDURE — 43450 DILATE ESOPHAGUS 1/MULT PASS: CPT | Performed by: INTERNAL MEDICINE

## 2021-01-28 PROCEDURE — 25010000002 PROPOFOL 10 MG/ML EMULSION: Performed by: ANESTHESIOLOGY

## 2021-01-28 PROCEDURE — S0260 H&P FOR SURGERY: HCPCS | Performed by: INTERNAL MEDICINE

## 2021-01-28 PROCEDURE — 81025 URINE PREGNANCY TEST: CPT | Performed by: INTERNAL MEDICINE

## 2021-01-28 PROCEDURE — 88305 TISSUE EXAM BY PATHOLOGIST: CPT | Performed by: INTERNAL MEDICINE

## 2021-01-28 PROCEDURE — 43239 EGD BIOPSY SINGLE/MULTIPLE: CPT | Performed by: INTERNAL MEDICINE

## 2021-01-28 PROCEDURE — 45385 COLONOSCOPY W/LESION REMOVAL: CPT | Performed by: INTERNAL MEDICINE

## 2021-01-28 RX ORDER — PROPOFOL 10 MG/ML
VIAL (ML) INTRAVENOUS CONTINUOUS PRN
Status: DISCONTINUED | OUTPATIENT
Start: 2021-01-28 | End: 2021-01-28 | Stop reason: SURG

## 2021-01-28 RX ORDER — SODIUM CHLORIDE, SODIUM LACTATE, POTASSIUM CHLORIDE, CALCIUM CHLORIDE 600; 310; 30; 20 MG/100ML; MG/100ML; MG/100ML; MG/100ML
30 INJECTION, SOLUTION INTRAVENOUS CONTINUOUS PRN
Status: DISCONTINUED | OUTPATIENT
Start: 2021-01-28 | End: 2021-01-28 | Stop reason: HOSPADM

## 2021-01-28 RX ORDER — PROMETHAZINE HYDROCHLORIDE 25 MG/1
25 TABLET ORAL ONCE AS NEEDED
Status: DISCONTINUED | OUTPATIENT
Start: 2021-01-28 | End: 2021-01-28 | Stop reason: HOSPADM

## 2021-01-28 RX ORDER — LAMOTRIGINE 25 MG/1
25 TABLET ORAL DAILY
COMMUNITY
End: 2022-07-13 | Stop reason: SDUPTHER

## 2021-01-28 RX ORDER — PROMETHAZINE HYDROCHLORIDE 25 MG/1
25 SUPPOSITORY RECTAL ONCE AS NEEDED
Status: DISCONTINUED | OUTPATIENT
Start: 2021-01-28 | End: 2021-01-28 | Stop reason: HOSPADM

## 2021-01-28 RX ORDER — PROPOFOL 10 MG/ML
VIAL (ML) INTRAVENOUS AS NEEDED
Status: DISCONTINUED | OUTPATIENT
Start: 2021-01-28 | End: 2021-01-28 | Stop reason: SURG

## 2021-01-28 RX ORDER — LIDOCAINE HYDROCHLORIDE 20 MG/ML
INJECTION, SOLUTION INFILTRATION; PERINEURAL AS NEEDED
Status: DISCONTINUED | OUTPATIENT
Start: 2021-01-28 | End: 2021-01-28 | Stop reason: SURG

## 2021-01-28 RX ADMIN — PROPOFOL 200 MCG/KG/MIN: 10 INJECTION, EMULSION INTRAVENOUS at 08:07

## 2021-01-28 RX ADMIN — SODIUM CHLORIDE, POTASSIUM CHLORIDE, SODIUM LACTATE AND CALCIUM CHLORIDE: 600; 310; 30; 20 INJECTION, SOLUTION INTRAVENOUS at 08:09

## 2021-01-28 RX ADMIN — LIDOCAINE HYDROCHLORIDE 40 MG: 20 INJECTION, SOLUTION INFILTRATION; PERINEURAL at 08:08

## 2021-01-28 RX ADMIN — PROPOFOL 150 MG: 10 INJECTION, EMULSION INTRAVENOUS at 08:08

## 2021-01-28 NOTE — ANESTHESIA PREPROCEDURE EVALUATION
Anesthesia Evaluation     Patient summary reviewed and Nursing notes reviewed   NPO Solid Status: > 8 hours  NPO Liquid Status: > 2 hours           Airway   Mallampati: I  TM distance: >3 FB  Neck ROM: full  No difficulty expected  Dental - normal exam     Pulmonary - normal exam   (+) a smoker Former,   (-) shortness of breath  Cardiovascular - negative cardio ROS and normal exam  Exercise tolerance: excellent (>7 METS)    (-) valvular problems/murmurs, dysrhythmias      Neuro/Psych  (+) seizures well controlled, headaches, psychiatric history,     (-) syncope, numbness  GI/Hepatic/Renal/Endo - negative ROS   (-) GI bleed    Musculoskeletal     (+) neck pain,   Abdominal  - normal exam    Bowel sounds: normal.   Substance History - negative use     OB/GYN negative ob/gyn ROS         Other                        Anesthesia Plan    ASA 2     MAC       Anesthetic plan, all risks, benefits, and alternatives have been provided, discussed and informed consent has been obtained with: patient.

## 2021-01-28 NOTE — ANESTHESIA POSTPROCEDURE EVALUATION
Patient: Desiree Peterson    Procedure Summary     Date: 01/28/21 Room / Location: [facility] ENDOSCOPY 8 / [facility] ENDOSCOPY    Anesthesia Start: 0807 Anesthesia Stop: 0841    Procedures:       ESOPHAGOGASTRODUODENOSCOPY WITH COLD BIOPSIES, URIARTE DILATION 54FR (N/A Esophagus)      COLONOSCOPY TO CECUM & T.I. WITH COLD BIOPSIES & COLD POLYPECTOMY (N/A ) Diagnosis:       Epigastric pain      Early satiety      Heartburn      Change in bowel habits      Rectal bleeding      (Epigastric pain [R10.13])      (Early satiety [R68.81])      (Heartburn [R12])      (Change in bowel habits [R19.4])      (Rectal bleeding [K62.5])    Surgeon: Tomas Saini MD Provider: Frank Reyes MD    Anesthesia Type: MAC ASA Status: 2          Anesthesia Type: MAC    Vitals  No vitals data found for the desired time range.          Post Anesthesia Care and Evaluation    Patient location during evaluation: bedside  Patient participation: complete - patient participated  Level of consciousness: awake  Pain management: adequate  Airway patency: patent  Anesthetic complications: No anesthetic complications  PONV Status: none  Cardiovascular status: acceptable  Respiratory status: acceptable  Hydration status: acceptable  Post Neuraxial Block status: Motor and sensory function returned to baseline

## 2021-01-29 LAB
LAB AP CASE REPORT: NORMAL
PATH REPORT.FINAL DX SPEC: NORMAL
PATH REPORT.GROSS SPEC: NORMAL

## 2021-02-16 ENCOUNTER — TELEPHONE (OUTPATIENT)
Dept: GASTROENTEROLOGY | Facility: CLINIC | Age: 36
End: 2021-02-16

## 2021-02-16 NOTE — TELEPHONE ENCOUNTER
----- Message from Tomas Saini MD sent at 1/29/2021  1:32 PM EST -----  Pathology benign  Colonoscopy recall 5 years  Gastric emptying study is scheduled  Office visit 6 weeks after gastric emptying study with Ayleen

## 2021-03-29 RX ORDER — LEVOTHYROXINE SODIUM 0.07 MG/1
TABLET ORAL
Qty: 30 TABLET | Refills: 3 | Status: SHIPPED | OUTPATIENT
Start: 2021-03-29 | End: 2021-07-26

## 2021-04-09 ENCOUNTER — TELEPHONE (OUTPATIENT)
Dept: FAMILY MEDICINE CLINIC | Facility: CLINIC | Age: 36
End: 2021-04-09

## 2021-04-09 NOTE — TELEPHONE ENCOUNTER
PATIENT WAS RECENTLY DIAGNOSED WITH BIPOLAR AND WAS GIVEN ZOLOFT, MINIPRESS, TRAZADONE, LAMICTTAL, ADDERAX.    SHE FEELS SHE IS OVER MEDICATED. SHE IS SLEEPING ALL THE TIME. SHE WANTED TO GET YOUR OPINION ON IT.     PLEASE ADVISE  [phone]

## 2021-04-09 NOTE — TELEPHONE ENCOUNTER
She wants to be sure none of these may be affecting her POTS in your opinion. Advised I would check with you but if has worsening of sx contact her psych who ordered the new RX

## 2021-04-16 ENCOUNTER — BULK ORDERING (OUTPATIENT)
Dept: CASE MANAGEMENT | Facility: OTHER | Age: 36
End: 2021-04-16

## 2021-04-16 DIAGNOSIS — Z23 IMMUNIZATION DUE: ICD-10-CM

## 2021-04-21 RX ORDER — OLANZAPINE 5 MG/1
TABLET ORAL
Qty: 30 TABLET | Refills: 3 | Status: SHIPPED | OUTPATIENT
Start: 2021-04-21 | End: 2021-08-21

## 2021-05-14 ENCOUNTER — APPOINTMENT (OUTPATIENT)
Dept: MRI IMAGING | Facility: HOSPITAL | Age: 36
End: 2021-05-14

## 2021-05-26 ENCOUNTER — TELEPHONE (OUTPATIENT)
Dept: NEUROLOGY | Facility: CLINIC | Age: 36
End: 2021-05-26

## 2021-05-26 DIAGNOSIS — G40.909 SEIZURE DISORDER (HCC): Primary | ICD-10-CM

## 2021-05-26 NOTE — TELEPHONE ENCOUNTER
Provider:     Caller: PT    Relationship to Patient: SELF    Pharmacy: NA    Phone Number: [phone]    Reason for Call: PATIENT STATES THAT SINCE HER BRAIN TUMOR WAS REMOVED AND THE WEEK LONG STUDIES SHE HAD DONE, THERE WAS NO SIGN OF SEIZURE ACTIVITY. SHE IS WANTING TO SEE IF SHE CAN WEAN OFF OF THE 2000MG A DAY OF KEPPRA. SHE ALSO SAID SHE IS ON A MED THAT COVERS SEIZURES, LAMICTAL AND HYDROXYZINE ALREADY PLEASE ADVISE.     When was the patient last seen: 12-9-20

## 2021-05-26 NOTE — TELEPHONE ENCOUNTER
Has she been seizure-free for 2 years?  Usually the conversation regarding weaning a seizure medication is pretty extensive and requires a clinic visit.

## 2021-05-27 NOTE — TELEPHONE ENCOUNTER
Attempted to call patient. No answer. Left detailed msg with information and request for call back.

## 2021-07-26 RX ORDER — LEVOTHYROXINE SODIUM 0.07 MG/1
TABLET ORAL
Qty: 30 TABLET | Refills: 3 | Status: SHIPPED | OUTPATIENT
Start: 2021-07-26 | End: 2021-12-29

## 2021-08-11 RX ORDER — DEXTROAMPHETAMINE SACCHARATE, AMPHETAMINE ASPARTATE, DEXTROAMPHETAMINE SULFATE AND AMPHETAMINE SULFATE 7.5; 7.5; 7.5; 7.5 MG/1; MG/1; MG/1; MG/1
30 TABLET ORAL 2 TIMES DAILY
Qty: 60 TABLET | Refills: 0 | Status: SHIPPED | OUTPATIENT
Start: 2021-08-11 | End: 2021-09-01 | Stop reason: SDUPTHER

## 2021-08-11 RX ORDER — DEXTROAMPHETAMINE SACCHARATE, AMPHETAMINE ASPARTATE, DEXTROAMPHETAMINE SULFATE AND AMPHETAMINE SULFATE 7.5; 7.5; 7.5; 7.5 MG/1; MG/1; MG/1; MG/1
30 TABLET ORAL 2 TIMES DAILY
Qty: 60 TABLET | Refills: 0
Start: 2021-08-11 | End: 2021-08-11 | Stop reason: SDUPTHER

## 2021-08-21 ENCOUNTER — TELEMEDICINE (OUTPATIENT)
Dept: FAMILY MEDICINE CLINIC | Facility: TELEHEALTH | Age: 36
End: 2021-08-21

## 2021-08-21 DIAGNOSIS — H92.01 ACUTE OTALGIA, RIGHT: ICD-10-CM

## 2021-08-21 DIAGNOSIS — H93.11 TINNITUS OF RIGHT EAR: Primary | ICD-10-CM

## 2021-08-21 PROCEDURE — 99213 OFFICE O/P EST LOW 20 MIN: CPT | Performed by: NURSE PRACTITIONER

## 2021-08-21 RX ORDER — HYDROXYZINE 50 MG/1
50 TABLET, FILM COATED ORAL 3 TIMES DAILY
COMMUNITY
Start: 2021-07-26 | End: 2022-04-18 | Stop reason: SDUPTHER

## 2021-08-21 RX ORDER — METHYLPREDNISOLONE 4 MG/1
TABLET ORAL
Qty: 21 TABLET | Refills: 0 | Status: SHIPPED | OUTPATIENT
Start: 2021-08-21 | End: 2022-09-01

## 2021-08-21 RX ORDER — TRAZODONE HYDROCHLORIDE 50 MG/1
TABLET ORAL
COMMUNITY
Start: 2021-07-29

## 2021-08-21 RX ORDER — AZITHROMYCIN 250 MG/1
TABLET, FILM COATED ORAL
Qty: 6 TABLET | Refills: 0 | Status: SHIPPED | OUTPATIENT
Start: 2021-08-21 | End: 2022-09-01

## 2021-08-21 NOTE — PROGRESS NOTES
Subjective   Chief Complaint   Patient presents with   • Tinnitus       Desiree Peterson is a 35 y.o. female.     Pt reports ringing in the right ear x yesterday. The ringing is so loud, it makes it hard for her to hear people talking. She feels like there is water moving in the right ear as well. She has some associated dizziness with movement but denies nausea/vomiting. She has had this problem in the past related to an ear infection and was treated with antibiotics and/or steroids.     Tinnitus  This is a recurrent problem. The current episode started yesterday. The problem occurs constantly. The problem has been unchanged. Associated symptoms include swollen glands (right side). Pertinent negatives include no abdominal pain, anorexia, arthralgias, change in bowel habit, chest pain, chills, congestion, coughing, diaphoresis, fatigue, fever, headaches, joint swelling, myalgias, nausea, neck pain, numbness, rash, sore throat, urinary symptoms, vertigo, visual change, vomiting or weakness. Treatments tried: ear flushing. The treatment provided no relief.        Allergies   Allergen Reactions   • Amoxicillin Rash   • Cefdinir Rash   • Penicillins Rash       Past Medical History:   Diagnosis Date   • Abnormal menses    • Anxiety and depression    • Atrial flutter (CMS/HCC)    • Bartholin cyst     X2   • Bipolar affective disorder, current episode mixed (CMS/HCC) 11/20/2020   • Brain tumor (CMS/HCC)    • Cervical pain (neck)    • Cyst, sinus nasal    • Dumping syndrome    • Heart murmur    • History of anemia    • Meningioma (CMS/HCC)    • Mid back pain    • Migraines    • POTS (postural orthostatic tachycardia syndrome)    • Seizures (CMS/HCC)    • SOB (shortness of breath)    • Syncope and collapse    • Thyroid nodule    • Tinnitus of both ears        Past Surgical History:   Procedure Laterality Date   • COLONOSCOPY N/A 1/28/2021    Procedure: COLONOSCOPY TO CECUM & T.I. WITH COLD BIOPSIES & COLD POLYPECTOMY;   Surgeon: Tomas Saini MD;  Location: [facility] ENDOSCOPY;  Service: Gastroenterology;  Laterality: N/A;  PRE- ANEMIA  POST- DIVERTICULOSIS, HEMORRHOIDS, POLYP   • CRANIOTOMY FOR TUMOR Right 10/16/2019    Procedure: Right frontotemporal craniotomy for tumor;  Surgeon: Nas Coats MD;  Location: [facility] MAIN OR;  Service: Neurosurgery   • ENDOSCOPY N/A 2021    Procedure: ESOPHAGOGASTRODUODENOSCOPY WITH COLD BIOPSIES, URIARTE DILATION 54FR;  Surgeon: Tomas Saini MD;  Location: [facility] ENDOSCOPY;  Service: Gastroenterology;  Laterality: N/A;  PRE-ANEMIA  POST-NORMAL   • FINGER SURGERY      Cyst removal       Social History     Socioeconomic History   • Marital status: Single     Spouse name: Not on file   • Number of children: 1   • Years of education: College   • Highest education level: Not on file   Tobacco Use   • Smoking status: Former Smoker     Packs/day: 1.00     Years: 3.00     Pack years: 3.00     Types: Cigarettes     Start date:      Quit date: 10/15/2019     Years since quittin.8   • Smokeless tobacco: Never Used   • Tobacco comment: vaping    Substance and Sexual Activity   • Alcohol use: No   • Drug use: No   • Sexual activity: Defer       Family History   Problem Relation Age of Onset   • No Known Problems Mother    • Kidney cancer Father    • No Known Problems Brother    • Malig Hyperthermia Neg Hx          Current Outpatient Medications:   •  amphetamine-dextroamphetamine (Adderall) 30 MG tablet, Take 1 tablet by mouth 2 (Two) Times a Day. For ADD, Disp: 60 tablet, Rfl: 0  •  azithromycin (Zithromax Z-Johny) 250 MG tablet, Take 2 tablets by mouth on day 1, then 1 tablet daily on days 2-5, Disp: 6 tablet, Rfl: 0  •  clonazePAM (KlonoPIN) 1 MG tablet, Take 1 tablet by mouth 3 (Three) Times a Day As Needed for Anxiety or Seizures., Disp: 60 tablet, Rfl: 0  •  hydrOXYzine (ATARAX) 50 MG tablet, Take 50 mg by mouth 3 (Three) Times a Day., Disp: , Rfl:   •  lamoTRIgine (LaMICtal) 25 MG  tablet, Take 25 mg by mouth Daily., Disp: , Rfl:   •  levETIRAcetam (KEPPRA) 1000 MG tablet, Take 1 tablet by mouth 2 (Two) Times a Day., Disp: 60 tablet, Rfl: 11  •  levothyroxine (SYNTHROID, LEVOTHROID) 75 MCG tablet, TAKE 1 TABLET BY MOUTH EVERY DAY, Disp: 30 tablet, Rfl: 3  •  methylPREDNISolone (MEDROL) 4 MG dose pack, Take as directed on package instructions., Disp: 21 tablet, Rfl: 0  •  rizatriptan MLT (MAXALT-MLT) 5 MG disintegrating tablet, Place 1 tablet on the tongue 1 (One) Time As Needed for Migraine for up to 30 days. May repeat in 2 hours if needed, Disp: 12 tablet, Rfl: 5  •  sertraline (ZOLOFT) 50 MG tablet, Take 100 mg by mouth Daily., Disp: , Rfl:   •  traZODone (DESYREL) 50 MG tablet, 1 OR 2 TABLETS AT BEDTIME, Disp: , Rfl:       Review of Systems   Constitutional: Negative for chills, diaphoresis, fatigue and fever.   HENT: Positive for ear pain, swollen glands (right side) and tinnitus. Negative for congestion, facial swelling, hearing loss, postnasal drip, rhinorrhea, sinus pressure, sneezing and sore throat.    Respiratory: Negative for cough, chest tightness and wheezing.    Cardiovascular: Negative for chest pain.   Gastrointestinal: Negative for abdominal pain, anorexia, change in bowel habit, nausea and vomiting.   Musculoskeletal: Negative for arthralgias, joint swelling, myalgias and neck pain.   Skin: Negative for rash.   Neurological: Positive for dizziness. Negative for vertigo, weakness, numbness and headache.        There were no vitals filed for this visit.    Objective   Physical Exam  Constitutional:       General: She is not in acute distress.     Appearance: Normal appearance. She is not ill-appearing, toxic-appearing or diaphoretic.   HENT:      Head: Normocephalic and atraumatic.      Right Ear: External ear normal. Decreased hearing (related to tinnitus) noted. No drainage or tenderness.      Ears:      Comments: Pt appears to be straining to hear     Nose: No congestion or  rhinorrhea.      Right Sinus: No maxillary sinus tenderness or frontal sinus tenderness.      Left Sinus: No maxillary sinus tenderness or frontal sinus tenderness.      Comments: Per pt     Mouth/Throat:      Lips: Pink.      Mouth: Mucous membranes are moist.   Pulmonary:      Effort: Pulmonary effort is normal.   Neurological:      Mental Status: She is alert and oriented to person, place, and time.   Psychiatric:         Mood and Affect: Mood normal.         Speech: Speech normal.         Behavior: Behavior normal.          Procedures     Assessment/Plan   Diagnoses and all orders for this visit:    1. Tinnitus of right ear (Primary)  -     methylPREDNISolone (MEDROL) 4 MG dose pack; Take as directed on package instructions.  Dispense: 21 tablet; Refill: 0  -     azithromycin (Zithromax Z-Johny) 250 MG tablet; Take 2 tablets by mouth on day 1, then 1 tablet daily on days 2-5  Dispense: 6 tablet; Refill: 0    2. Acute otalgia, right  -     methylPREDNISolone (MEDROL) 4 MG dose pack; Take as directed on package instructions.  Dispense: 21 tablet; Refill: 0  -     azithromycin (Zithromax Z-Johny) 250 MG tablet; Take 2 tablets by mouth on day 1, then 1 tablet daily on days 2-5  Dispense: 6 tablet; Refill: 0    Take medicine as prescribed.   Continue Flonase and/or allergy medicine of choice.  Alternate tylenol and motrin for pain and/or fever, stay hydrated and rest.   If symptoms worsen or do not improve follow up with your PCP or visit your nearest Urgent Care Center or ER.          PLAN: Discussed dosing, side effects, recommended other symptomatic care.  Patient should follow up with primary care provider if symptoms worsen, fail to resolve or other symptoms need attention. Patient/family agree to the above.     I spent 20 minutes caring for Desiree on this date of service. This time includes time spent by me in the following activities:preparing for the visit, obtaining and/or reviewing a separately obtained  history, performing a medically appropriate examination and/or evaluation , counseling and educating the patient/family/caregiver, ordering medications, tests, or procedures and documenting information in the medical record    MYAH Guardado     This visit was performed via Telehealth.  This patient has been instructed to follow-up with their primary care provider if their symptoms worsen or the treatment provided does not resolve their illness.

## 2021-08-21 NOTE — PATIENT INSTRUCTIONS
Take medicine as prescribed.   Continue Flonase and/or allergy medicine of choice.  Alternate tylenol and motrin for pain and/or fever, stay hydrated and rest.   If symptoms worsen or do not improve follow up with your PCP or visit your nearest Urgent Care Center or ER.      Tinnitus  Tinnitus refers to hearing a sound when there is no actual source for that sound. This is often described as ringing in the ears. However, people with this condition may hear a variety of noises, in one ear or in both ears.  The sounds of tinnitus can be soft, loud, or somewhere in between. Tinnitus can last for a few seconds or can be constant for days. It may go away without treatment and come back at various times. When tinnitus is constant or happens often, it can lead to other problems, such as trouble sleeping and trouble concentrating.  Almost everyone experiences tinnitus at some point. Tinnitus that is long-lasting (chronic) or comes back often (recurs) may require medical attention.  What are the causes?  The cause of tinnitus is often not known. In some cases, it can result from:  · Exposure to loud noises from machinery, music, or other sources.  · An object (foreign body) stuck in the ear.  · Earwax buildup.  · Drinking alcohol or caffeine.  · Taking certain medicines.  · Age-related hearing loss.  It may also be caused by medical conditions such as:  · Ear or sinus infections.  · High blood pressure.  · Heart diseases.  · Anemia.  · Allergies.  · Meniere's disease.  · Thyroid problems.  · Tumors.  · A weak, bulging blood vessel (aneurysm) near the ear.  What are the signs or symptoms?  The main symptom of tinnitus is hearing a sound when there is no source for that sound. It may sound like:  · Buzzing.  · Roaring.  · Ringing.  · Blowing air.  · Hissing.  · Whistling.  · Sizzling.  · Humming.  · Running water.  · A musical note.  · Tapping.  Symptoms may affect only one ear (unilateral) or both ears (bilateral).  How is  this diagnosed?  Tinnitus is diagnosed based on your symptoms, your medical history, and a physical exam. Your health care provider may do a thorough hearing test (audiologic exam) if your tinnitus:  · Is unilateral.  · Causes hearing difficulties.  · Lasts 6 months or longer.  You may work with a health care provider who specializes in hearing disorders (audiologist). You may be asked questions about your symptoms and how they affect your daily life. You may have other tests done, such as:  · CT scan.  · MRI.  · An imaging test of how blood flows through your blood vessels (angiogram).  How is this treated?  Treating an underlying medical condition can sometimes make tinnitus go away. If your tinnitus continues, other treatments may include:  · Medicines.  · Therapy and counseling to help you manage the stress of living with tinnitus.  · Sound generators to mask the tinnitus. These include:  ? Tabletop sound machines that play relaxing sounds to help you fall asleep.  ? Wearable devices that fit in your ear and play sounds or music.  ? Acoustic neural stimulation. This involves using headphones to listen to music that contains an auditory signal. Over time, listening to this signal may change some pathways in your brain and make you less sensitive to tinnitus. This treatment is used for very severe cases when no other treatment is working.  · Using hearing aids or cochlear implants if your tinnitus is related to hearing loss. Hearing aids are worn in the outer ear. Cochlear implants are surgically placed in the inner ear.  Follow these instructions at home:  Managing symptoms         · When possible, avoid being in loud places and being exposed to loud sounds.  · Wear hearing protection, such as earplugs, when you are exposed to loud noises.  · Use a white noise machine, a humidifier, or other devices to mask the sound of tinnitus.  · Practice techniques for reducing stress, such as meditation, yoga, or deep  breathing. Work with your health care provider if you need help with managing stress.  · Sleep with your head slightly raised. This may reduce the impact of tinnitus.  General instructions  · Do not use stimulants, such as nicotine, alcohol, or caffeine. Talk with your health care provider about other stimulants to avoid. Stimulants are substances that can make you feel alert and attentive by increasing certain activities in the body (such as heart rate and blood pressure). These substances may make tinnitus worse.  · Take over-the-counter and prescription medicines only as told by your health care provider.  · Try to get plenty of sleep each night.  · Keep all follow-up visits as told by your health care provider. This is important.  Contact a health care provider if:  · Your tinnitus continues for 3 weeks or longer without stopping.  · You develop sudden hearing loss.  · Your symptoms get worse or do not get better with home care.  · You feel you are not able to manage the stress of living with tinnitus.  Get help right away if:  · You develop tinnitus after a head injury.  · You have tinnitus along with any of the following:  ? Dizziness.  ? Loss of balance.  ? Nausea and vomiting.  ? Sudden, severe headache.  These symptoms may represent a serious problem that is an emergency. Do not wait to see if the symptoms will go away. Get medical help right away. Call your local emergency services (911 in the [place] Do not drive yourself to the hospital.  Summary  · Tinnitus refers to hearing a sound when there is no actual source for that sound. This is often described as ringing in the ears.  · Symptoms may affect only one ear (unilateral) or both ears (bilateral).  · Use a white noise machine, a humidifier, or other devices to mask the sound of tinnitus.  · Do not use stimulants, such as nicotine, alcohol, or caffeine. Talk with your health care provider about other stimulants to avoid. These substances may make  tinnitus worse.  This information is not intended to replace advice given to you by your health care provider. Make sure you discuss any questions you have with your health care provider.  Document Revised: 07/01/2020 Document Reviewed: 09/27/2018  [facility] Patient Education © 2021 [employer]    Earache, Adult  An earache, or ear pain, can be caused by many things, including:  · An infection.  · Ear wax buildup.  · Ear pressure.  · Something in the ear that should not be there (foreign body).  · A sore throat.  · Tooth problems.  · Jaw problems.  Treatment of the earache will depend on the cause. If the cause is not clear or cannot be determined, you may need to watch your symptoms until your earache goes away or until a cause is found.  Follow these instructions at home:  Medicines  · Take or apply over-the-counter and prescription medicines only as told by your health care provider.  · If you were prescribed an antibiotic medicine, use it as told by your health care provider. Do not stop using the antibiotic even if you start to feel better.  · Do not put anything in your ear other than medicine that is prescribed by your health care provider.  Managing pain  If directed, apply heat to the affected area as often as told by your health care provider. Use the heat source that your health care provider recommends, such as a moist heat pack or a heating pad.  · Place a towel between your skin and the heat source.  · Leave the heat on for 20-30 minutes.  · Remove the heat if your skin turns bright red. This is especially important if you are unable to feel pain, heat, or cold. You may have a greater risk of getting burned.  If directed, put ice on the affected area as often as told by your health care provider. To do this:         · Put ice in a plastic bag.  · Place a towel between your skin and the bag.  · Leave the ice on for 20 minutes, 2-3 times a day.  General instructions  · Pay attention to any changes in  your symptoms.  · Try resting in an upright position instead of lying down. This may help to reduce pressure in your ear and relieve pain.  · Chew gum if it helps to relieve your ear pain.  · Treat any allergies as told by your health care provider.  · Drink enough fluid to keep your urine pale yellow.  · It is up to you to get the results of any tests that were done. Ask your health care provider, or the department that is doing the tests, when your results will be ready.  · Keep all follow-up visits as told by your health care provider. This is important.  Contact a health care provider if:  · Your pain does not improve within 2 days.  · Your earache gets worse.  · You have new symptoms.  · You have a fever.  Get help right away if you:  · Have a severe headache.  · Have a stiff neck.  · Have trouble swallowing.  · Have redness or swelling behind your ear.  · Have fluid or blood coming from your ear.  · Have hearing loss.  · Feel dizzy.  Summary  · An earache, or ear pain, can be caused by many things.  · Treatment of the earache will depend on the cause. Follow recommendations from your health care provider to treat your ear pain.  · If the cause is not clear or cannot be determined, you may need to watch your symptoms until your earache goes away or until a cause is found.  · Keep all follow-up visits as told by your health care provider. This is important.  This information is not intended to replace advice given to you by your health care provider. Make sure you discuss any questions you have with your health care provider.  Document Revised: 07/25/2020 Document Reviewed: 07/25/2020  [employer] Patient Education © 2021 [employer]

## 2021-08-23 RX ORDER — RIZATRIPTAN BENZOATE 5 MG/1
5 TABLET, ORALLY DISINTEGRATING ORAL ONCE AS NEEDED
Qty: 12 TABLET | Refills: 0 | Status: SHIPPED | OUTPATIENT
Start: 2021-08-23 | End: 2023-02-06

## 2021-08-26 ENCOUNTER — TELEPHONE (OUTPATIENT)
Dept: NEUROLOGY | Facility: CLINIC | Age: 36
End: 2021-08-26

## 2021-08-26 NOTE — TELEPHONE ENCOUNTER
----- Message from Lenka Chaudhari MD sent at 8/26/2021  8:48 AM EDT -----  Regarding: no shows  This patient has no showed 3 appointments.  If she calls to reschedule, I recommend an in person visit and not a telemedicine visit.  If she has one more no show, she should be discharged from the practice.  Thanks!

## 2021-09-01 RX ORDER — DEXTROAMPHETAMINE SACCHARATE, AMPHETAMINE ASPARTATE, DEXTROAMPHETAMINE SULFATE AND AMPHETAMINE SULFATE 7.5; 7.5; 7.5; 7.5 MG/1; MG/1; MG/1; MG/1
30 TABLET ORAL 2 TIMES DAILY
Qty: 60 TABLET | Refills: 0 | Status: SHIPPED | OUTPATIENT
Start: 2021-09-01 | End: 2022-02-24 | Stop reason: SDUPTHER

## 2021-09-16 ENCOUNTER — TELEPHONE (OUTPATIENT)
Dept: NEUROSURGERY | Facility: CLINIC | Age: 36
End: 2021-09-16

## 2021-09-16 DIAGNOSIS — D32.9 MENINGIOMA (HCC): Primary | ICD-10-CM

## 2021-09-16 NOTE — TELEPHONE ENCOUNTER
LMR for pt to get MRI brain for meningioma and f/u with Dr Coats.   New order for Mri put in chart.

## 2021-10-08 ENCOUNTER — HOSPITAL ENCOUNTER (OUTPATIENT)
Dept: MRI IMAGING | Facility: HOSPITAL | Age: 36
Discharge: HOME | End: 2021-10-08
Admitting: NEUROLOGICAL SURGERY

## 2021-10-08 DIAGNOSIS — D32.9 MENINGIOMA (HCC): ICD-10-CM

## 2021-10-08 PROCEDURE — 70553 MRI BRAIN STEM W/O & W/DYE: CPT

## 2021-10-08 PROCEDURE — A9577 INJ MULTIHANCE: HCPCS | Performed by: NEUROLOGICAL SURGERY

## 2021-10-08 PROCEDURE — 0 GADOBENATE DIMEGLUMINE 529 MG/ML SOLUTION: Performed by: NEUROLOGICAL SURGERY

## 2021-10-08 RX ADMIN — GADOBENATE DIMEGLUMINE 16 ML: 529 INJECTION, SOLUTION INTRAVENOUS at 21:41

## 2021-10-11 RX ORDER — LEVETIRACETAM 1000 MG/1
1000 TABLET ORAL 2 TIMES DAILY
Qty: 60 TABLET | Refills: 0 | Status: SHIPPED | OUTPATIENT
Start: 2021-10-11 | End: 2021-11-18

## 2021-10-13 ENCOUNTER — TELEPHONE (OUTPATIENT)
Dept: NEUROSURGERY | Facility: CLINIC | Age: 36
End: 2021-10-13

## 2021-10-13 NOTE — TELEPHONE ENCOUNTER
I l/m for pt to call to schedule a fu appt go to over MRI results. Can be telephone visit if pt prefers.

## 2021-10-15 ENCOUNTER — TELEPHONE (OUTPATIENT)
Dept: NEUROSURGERY | Facility: CLINIC | Age: 36
End: 2021-10-15

## 2021-10-20 ENCOUNTER — TELEPHONE (OUTPATIENT)
Dept: NEUROSURGERY | Facility: CLINIC | Age: 36
End: 2021-10-20

## 2021-10-27 ENCOUNTER — TELEPHONE (OUTPATIENT)
Dept: NEUROSURGERY | Facility: CLINIC | Age: 36
End: 2021-10-27

## 2021-11-18 RX ORDER — LEVETIRACETAM 1000 MG/1
TABLET ORAL
Qty: 60 TABLET | Refills: 0 | Status: SHIPPED | OUTPATIENT
Start: 2021-11-18 | End: 2021-12-30 | Stop reason: SDUPTHER

## 2021-12-29 RX ORDER — LEVOTHYROXINE SODIUM 0.07 MG/1
TABLET ORAL
Qty: 30 TABLET | Refills: 3 | Status: SHIPPED | OUTPATIENT
Start: 2021-12-29 | End: 2022-02-24 | Stop reason: SDUPTHER

## 2021-12-30 RX ORDER — LEVETIRACETAM 1000 MG/1
1000 TABLET ORAL 2 TIMES DAILY
Qty: 60 TABLET | Refills: 0 | Status: SHIPPED | OUTPATIENT
Start: 2021-12-30 | End: 2022-01-27 | Stop reason: SDUPTHER

## 2022-01-29 RX ORDER — LEVETIRACETAM 1000 MG/1
1000 TABLET ORAL 2 TIMES DAILY
Qty: 60 TABLET | Refills: 1 | Status: SHIPPED | OUTPATIENT
Start: 2022-01-29 | End: 2022-02-24 | Stop reason: SDUPTHER

## 2022-02-25 RX ORDER — RIZATRIPTAN BENZOATE 5 MG/1
5 TABLET, ORALLY DISINTEGRATING ORAL ONCE AS NEEDED
Qty: 12 TABLET | Refills: 0 | OUTPATIENT
Start: 2022-02-25 | End: 2022-03-27

## 2022-02-25 RX ORDER — DEXTROAMPHETAMINE SACCHARATE, AMPHETAMINE ASPARTATE, DEXTROAMPHETAMINE SULFATE AND AMPHETAMINE SULFATE 7.5; 7.5; 7.5; 7.5 MG/1; MG/1; MG/1; MG/1
30 TABLET ORAL 2 TIMES DAILY
Qty: 60 TABLET | Refills: 0 | Status: SHIPPED | OUTPATIENT
Start: 2022-02-25 | End: 2022-04-13 | Stop reason: SDUPTHER

## 2022-02-25 RX ORDER — LEVETIRACETAM 1000 MG/1
1000 TABLET ORAL 2 TIMES DAILY
Qty: 60 TABLET | Refills: 1 | Status: SHIPPED | OUTPATIENT
Start: 2022-02-25 | End: 2022-04-18

## 2022-02-25 RX ORDER — CLONAZEPAM 1 MG/1
1 TABLET ORAL 3 TIMES DAILY PRN
Qty: 60 TABLET | Refills: 0 | Status: SHIPPED | OUTPATIENT
Start: 2022-02-25 | End: 2022-04-13 | Stop reason: SDUPTHER

## 2022-02-25 RX ORDER — LEVOTHYROXINE SODIUM 0.07 MG/1
75 TABLET ORAL DAILY
Qty: 30 TABLET | Refills: 3 | Status: SHIPPED | OUTPATIENT
Start: 2022-02-25 | End: 2022-04-13 | Stop reason: SDUPTHER

## 2022-04-13 DIAGNOSIS — Z51.81 MEDICATION MONITORING ENCOUNTER: Primary | ICD-10-CM

## 2022-04-13 RX ORDER — DEXTROAMPHETAMINE SACCHARATE, AMPHETAMINE ASPARTATE, DEXTROAMPHETAMINE SULFATE AND AMPHETAMINE SULFATE 7.5; 7.5; 7.5; 7.5 MG/1; MG/1; MG/1; MG/1
30 TABLET ORAL 2 TIMES DAILY
Qty: 60 TABLET | Refills: 0 | Status: SHIPPED | OUTPATIENT
Start: 2022-04-13 | End: 2022-04-23 | Stop reason: SDUPTHER

## 2022-04-13 RX ORDER — LEVOTHYROXINE SODIUM 0.07 MG/1
75 TABLET ORAL DAILY
Qty: 30 TABLET | Refills: 3 | Status: SHIPPED | OUTPATIENT
Start: 2022-04-13 | End: 2022-08-20

## 2022-04-13 RX ORDER — CLONAZEPAM 1 MG/1
1 TABLET ORAL 3 TIMES DAILY PRN
Qty: 60 TABLET | Refills: 0 | Status: SHIPPED | OUTPATIENT
Start: 2022-04-13 | End: 2022-04-26 | Stop reason: SDUPTHER

## 2022-04-18 RX ORDER — LEVETIRACETAM 1000 MG/1
TABLET ORAL
Qty: 180 TABLET | Refills: 0 | Status: SHIPPED | OUTPATIENT
Start: 2022-04-18 | End: 2022-06-03

## 2022-04-18 RX ORDER — HYDROXYZINE 50 MG/1
50 TABLET, FILM COATED ORAL 3 TIMES DAILY
Qty: 90 TABLET | Refills: 0 | Status: SHIPPED | OUTPATIENT
Start: 2022-04-18 | End: 2022-05-17

## 2022-04-23 DIAGNOSIS — Z51.81 MEDICATION MONITORING ENCOUNTER: ICD-10-CM

## 2022-04-23 RX ORDER — DEXTROAMPHETAMINE SACCHARATE, AMPHETAMINE ASPARTATE, DEXTROAMPHETAMINE SULFATE AND AMPHETAMINE SULFATE 7.5; 7.5; 7.5; 7.5 MG/1; MG/1; MG/1; MG/1
30 TABLET ORAL 2 TIMES DAILY
Qty: 60 TABLET | Refills: 0 | Status: SHIPPED | OUTPATIENT
Start: 2022-04-23 | End: 2022-04-28 | Stop reason: SDUPTHER

## 2022-04-25 DIAGNOSIS — Z51.81 MEDICATION MONITORING ENCOUNTER: ICD-10-CM

## 2022-04-25 RX ORDER — CLONAZEPAM 1 MG/1
1 TABLET ORAL 3 TIMES DAILY PRN
Qty: 60 TABLET | Refills: 0 | Status: CANCELLED | OUTPATIENT
Start: 2022-04-25

## 2022-04-26 DIAGNOSIS — Z51.81 MEDICATION MONITORING ENCOUNTER: ICD-10-CM

## 2022-04-26 RX ORDER — CLONAZEPAM 1 MG/1
1 TABLET ORAL 3 TIMES DAILY PRN
Qty: 60 TABLET | Refills: 0 | Status: SHIPPED | OUTPATIENT
Start: 2022-04-26 | End: 2022-05-03 | Stop reason: SDUPTHER

## 2022-04-27 ENCOUNTER — OFFICE VISIT (OUTPATIENT)
Dept: FAMILY MEDICINE CLINIC | Facility: CLINIC | Age: 37
End: 2022-04-27

## 2022-04-27 VITALS
HEART RATE: 70 BPM | BODY MASS INDEX: 28.56 KG/M2 | SYSTOLIC BLOOD PRESSURE: 100 MMHG | DIASTOLIC BLOOD PRESSURE: 60 MMHG | HEIGHT: 67 IN | TEMPERATURE: 98.2 F | RESPIRATION RATE: 20 BRPM | WEIGHT: 182 LBS | OXYGEN SATURATION: 99 %

## 2022-04-27 DIAGNOSIS — Z86.69 HISTORY OF MIGRAINE HEADACHES: ICD-10-CM

## 2022-04-27 DIAGNOSIS — M54.50 LUMBAR PAIN: ICD-10-CM

## 2022-04-27 DIAGNOSIS — A18.01 POTT'S DISEASE: Primary | ICD-10-CM

## 2022-04-27 DIAGNOSIS — F31.9 BIPOLAR AFFECTIVE DISORDER, REMISSION STATUS UNSPECIFIED: ICD-10-CM

## 2022-04-27 DIAGNOSIS — Z86.018 HISTORY OF MENINGIOMA: ICD-10-CM

## 2022-04-27 DIAGNOSIS — R20.2 NUMBNESS AND TINGLING IN BOTH HANDS: ICD-10-CM

## 2022-04-27 DIAGNOSIS — F41.9 ANXIETY: ICD-10-CM

## 2022-04-27 DIAGNOSIS — R20.0 NUMBNESS AND TINGLING IN BOTH HANDS: ICD-10-CM

## 2022-04-27 DIAGNOSIS — M54.10 RADICULOPATHY, UNSPECIFIED SPINAL REGION: ICD-10-CM

## 2022-04-27 DIAGNOSIS — F98.8 ATTENTION DEFICIT DISORDER, UNSPECIFIED HYPERACTIVITY PRESENCE: ICD-10-CM

## 2022-04-27 DIAGNOSIS — Z51.81 MEDICATION MONITORING ENCOUNTER: ICD-10-CM

## 2022-04-27 LAB
ALBUMIN SERPL-MCNC: 4.5 G/DL (ref 3.5–5.2)
ALBUMIN/GLOB SERPL: 2.1 G/DL
ALP SERPL-CCNC: 68 U/L (ref 39–117)
ALT SERPL W P-5'-P-CCNC: 9 U/L (ref 1–33)
ANION GAP SERPL CALCULATED.3IONS-SCNC: 12 MMOL/L (ref 5–15)
AST SERPL-CCNC: 15 U/L (ref 1–32)
BILIRUB SERPL-MCNC: 0.3 MG/DL (ref 0–1.2)
BUN SERPL-MCNC: 9 MG/DL (ref 6–20)
BUN/CREAT SERPL: 12.3 (ref 7–25)
CALCIUM SPEC-SCNC: 9.1 MG/DL (ref 8.6–10.5)
CHLORIDE SERPL-SCNC: 105 MMOL/L (ref 98–107)
CHOLEST SERPL-MCNC: 175 MG/DL (ref 0–200)
CO2 SERPL-SCNC: 25 MMOL/L (ref 22–29)
CREAT SERPL-MCNC: 0.73 MG/DL (ref 0.57–1)
EGFRCR SERPLBLD CKD-EPI 2021: 110.1 ML/MIN/1.73
ERYTHROCYTE [DISTWIDTH] IN BLOOD BY AUTOMATED COUNT: 14.7 % (ref 12.3–15.4)
GLOBULIN UR ELPH-MCNC: 2.1 GM/DL
GLUCOSE SERPL-MCNC: 89 MG/DL (ref 65–99)
HCT VFR BLD AUTO: 38.4 % (ref 34–46.6)
HDLC SERPL-MCNC: 48 MG/DL (ref 40–60)
HGB BLD-MCNC: 12.1 G/DL (ref 12–15.9)
LDLC SERPL CALC-MCNC: 115 MG/DL (ref 0–100)
LDLC/HDLC SERPL: 2.38 {RATIO}
LYMPHOCYTES # BLD AUTO: 1.8 10*3/MM3 (ref 0.7–3.1)
LYMPHOCYTES NFR BLD AUTO: 34.5 % (ref 19.6–45.3)
MCH RBC QN AUTO: 25.5 PG (ref 26.6–33)
MCHC RBC AUTO-ENTMCNC: 31.5 G/DL (ref 31.5–35.7)
MCV RBC AUTO: 80.8 FL (ref 79–97)
MONOCYTES # BLD AUTO: 0.5 10*3/MM3 (ref 0.1–0.9)
MONOCYTES NFR BLD AUTO: 10.4 % (ref 5–12)
NEUTROPHILS NFR BLD AUTO: 2.9 10*3/MM3 (ref 1.7–7)
NEUTROPHILS NFR BLD AUTO: 55.1 % (ref 42.7–76)
PLATELET # BLD AUTO: 299 10*3/MM3 (ref 140–450)
PMV BLD AUTO: 6.7 FL (ref 6–12)
POTASSIUM SERPL-SCNC: 4.5 MMOL/L (ref 3.5–5.2)
PROT SERPL-MCNC: 6.6 G/DL (ref 6–8.5)
RBC # BLD AUTO: 4.75 10*6/MM3 (ref 3.77–5.28)
SODIUM SERPL-SCNC: 142 MMOL/L (ref 136–145)
TRIGL SERPL-MCNC: 63 MG/DL (ref 0–150)
TSH SERPL DL<=0.05 MIU/L-ACNC: 0.81 UIU/ML (ref 0.27–4.2)
VLDLC SERPL-MCNC: 12 MG/DL (ref 5–40)
WBC NRBC COR # BLD: 5.2 10*3/MM3 (ref 3.4–10.8)

## 2022-04-27 PROCEDURE — 80061 LIPID PANEL: CPT | Performed by: FAMILY MEDICINE

## 2022-04-27 PROCEDURE — 84443 ASSAY THYROID STIM HORMONE: CPT | Performed by: FAMILY MEDICINE

## 2022-04-27 PROCEDURE — 85025 COMPLETE CBC W/AUTO DIFF WBC: CPT | Performed by: FAMILY MEDICINE

## 2022-04-27 PROCEDURE — 80053 COMPREHEN METABOLIC PANEL: CPT | Performed by: FAMILY MEDICINE

## 2022-04-27 PROCEDURE — 99214 OFFICE O/P EST MOD 30 MIN: CPT | Performed by: FAMILY MEDICINE

## 2022-04-27 RX ORDER — METHYLPREDNISOLONE 4 MG/1
TABLET ORAL
Qty: 21 TABLET | Refills: 0 | Status: SHIPPED | OUTPATIENT
Start: 2022-04-27 | End: 2022-09-01

## 2022-04-27 RX ORDER — DEXTROAMPHETAMINE SACCHARATE, AMPHETAMINE ASPARTATE, DEXTROAMPHETAMINE SULFATE AND AMPHETAMINE SULFATE 7.5; 7.5; 7.5; 7.5 MG/1; MG/1; MG/1; MG/1
30 TABLET ORAL 2 TIMES DAILY
Qty: 60 TABLET | Refills: 0 | Status: CANCELLED | OUTPATIENT
Start: 2022-04-27

## 2022-04-27 NOTE — PROGRESS NOTES
"SUBJECTIVE:  The patient is a 36-year-old female.  She has multiple medical problems including POTS disease migraine headaches anxiety ADD history of meningioma.  She planes of numbness and tingling in her hands today.  She complains of excessive sweating.  She is having some back discomfort after doing some lifting that radiates to her left leg.  Occurred roughly 4 days ago.    PAST MEDICAL HISTORY:  Reviewed.    REVIEW OF SYSTEMS:  Please see above.  All others reviewed and are negative.      OBJECTIVE:   /60 (BP Location: Left arm, Patient Position: Sitting)   Pulse 70   Temp 98.2 °F (36.8 °C) (Infrared)   Resp 20   Ht 170.2 cm (67\")   Wt 82.6 kg (182 lb)   SpO2 99%   BMI 28.51 kg/m²    Vitals signs are reviewed and are stable.    General:  Well-nourished.  Alert and oriented x3 in no acute distress.  HEENT: PERRLA.  Discharge.  TMs clear.  Neck:  Supple.   Lungs:  Clear.    Heart:  Regular rate and rhythm.   Abdomen:   Soft, nontender.   Back: Decreased range of motion.  Tenderness along the lumbar region.  Straight leg raise is negative.  Extremities:  No cyanosis, clubbing or edema.   Neurological:  Grossly intact without motor or sensory deficits.  Deep tendon reflexes symmetrical.    ASSESSMENT:      Diagnoses and all orders for this visit:    1. Pott's disease (Primary)  -     CBC & Differential  -     Comprehensive Metabolic Panel  -     Lipid Panel  -     TSH    2. History of migraine headaches  -     CBC & Differential  -     Comprehensive Metabolic Panel  -     Lipid Panel  -     Ambulatory Referral to Neurology  -     TSH    3. Anxiety  -     CBC & Differential  -     Comprehensive Metabolic Panel  -     Lipid Panel  -     TSH    4. Attention deficit disorder, unspecified hyperactivity presence  -     CBC & Differential  -     Comprehensive Metabolic Panel  -     Lipid Panel  -     TSH    5. Radiculopathy, unspecified spinal region  -     CBC & Differential  -     Comprehensive Metabolic " Panel  -     Lipid Panel  -     TSH    6. Bipolar affective disorder, remission status unspecified (HCC)  -     CBC & Differential  -     Comprehensive Metabolic Panel  -     Lipid Panel  -     TSH    7. History of meningioma  -     CBC & Differential  -     Comprehensive Metabolic Panel  -     Lipid Panel  -     TSH    8. Numbness and tingling in both hands  -     EMG & Nerve Conduction Test; Future  -     TSH    9. Lumbar pain  -     XR Spine Lumbar 2 or 3 View  -     TSH    Other orders  -     methylPREDNISolone (MEDROL) 4 MG dose pack; follow package directions  Dispense: 21 tablet; Refill: 0         PLAN: Above orders.  Patient will also be referred to physical therapy.  She will follow-up on her studies and her x-ray.  She will let me know how she is doing over the next week.  She is advised to go to emergency room for any problems.    Dictated utilizing Dragon dictation.

## 2022-04-28 DIAGNOSIS — Z51.81 MEDICATION MONITORING ENCOUNTER: ICD-10-CM

## 2022-04-28 RX ORDER — CLONAZEPAM 1 MG/1
1 TABLET ORAL 3 TIMES DAILY PRN
Qty: 60 TABLET | Refills: 0 | Status: CANCELLED | OUTPATIENT
Start: 2022-04-28

## 2022-04-28 RX ORDER — DEXTROAMPHETAMINE SACCHARATE, AMPHETAMINE ASPARTATE, DEXTROAMPHETAMINE SULFATE AND AMPHETAMINE SULFATE 7.5; 7.5; 7.5; 7.5 MG/1; MG/1; MG/1; MG/1
30 TABLET ORAL 2 TIMES DAILY
Qty: 60 TABLET | Refills: 0 | Status: SHIPPED | OUTPATIENT
Start: 2022-04-28 | End: 2022-05-03 | Stop reason: SDUPTHER

## 2022-04-28 RX ORDER — DEXTROAMPHETAMINE SACCHARATE, AMPHETAMINE ASPARTATE, DEXTROAMPHETAMINE SULFATE AND AMPHETAMINE SULFATE 7.5; 7.5; 7.5; 7.5 MG/1; MG/1; MG/1; MG/1
30 TABLET ORAL 2 TIMES DAILY
Qty: 60 TABLET | Refills: 0 | Status: CANCELLED | OUTPATIENT
Start: 2022-04-28

## 2022-05-02 ENCOUNTER — HOSPITAL ENCOUNTER (OUTPATIENT)
Dept: GENERAL RADIOLOGY | Facility: HOSPITAL | Age: 37
Discharge: HOME | End: 2022-05-02
Admitting: FAMILY MEDICINE

## 2022-05-02 PROCEDURE — 72100 X-RAY EXAM L-S SPINE 2/3 VWS: CPT

## 2022-05-03 DIAGNOSIS — Z51.81 MEDICATION MONITORING ENCOUNTER: ICD-10-CM

## 2022-05-03 LAB — DRUGS UR: NORMAL

## 2022-05-04 RX ORDER — DEXTROAMPHETAMINE SACCHARATE, AMPHETAMINE ASPARTATE, DEXTROAMPHETAMINE SULFATE AND AMPHETAMINE SULFATE 7.5; 7.5; 7.5; 7.5 MG/1; MG/1; MG/1; MG/1
30 TABLET ORAL 2 TIMES DAILY
Qty: 60 TABLET | Refills: 0 | Status: SHIPPED | OUTPATIENT
Start: 2022-05-04

## 2022-05-04 RX ORDER — CLONAZEPAM 1 MG/1
1 TABLET ORAL 3 TIMES DAILY PRN
Qty: 60 TABLET | Refills: 0 | Status: SHIPPED | OUTPATIENT
Start: 2022-05-04 | End: 2022-06-03

## 2022-05-17 RX ORDER — HYDROXYZINE 50 MG/1
TABLET, FILM COATED ORAL
Qty: 90 TABLET | Refills: 0 | Status: SHIPPED | OUTPATIENT
Start: 2022-05-17 | End: 2022-06-03

## 2022-05-25 ENCOUNTER — TELEPHONE (OUTPATIENT)
Dept: NEUROLOGY | Facility: CLINIC | Age: 37
End: 2022-05-25

## 2022-05-26 ENCOUNTER — APPOINTMENT (OUTPATIENT)
Dept: INFUSION THERAPY | Facility: HOSPITAL | Age: 37
End: 2022-05-26

## 2022-06-02 DIAGNOSIS — Z51.81 MEDICATION MONITORING ENCOUNTER: ICD-10-CM

## 2022-06-03 RX ORDER — CLONAZEPAM 1 MG/1
TABLET ORAL
Qty: 60 TABLET | Refills: 0 | Status: SHIPPED | OUTPATIENT
Start: 2022-06-03 | End: 2023-02-06

## 2022-06-03 RX ORDER — LEVETIRACETAM 1000 MG/1
TABLET ORAL
Qty: 180 TABLET | Refills: 0 | Status: SHIPPED | OUTPATIENT
Start: 2022-06-03 | End: 2022-10-04

## 2022-06-03 RX ORDER — HYDROXYZINE 50 MG/1
TABLET, FILM COATED ORAL
Qty: 90 TABLET | Refills: 0 | Status: SHIPPED | OUTPATIENT
Start: 2022-06-03 | End: 2022-08-20

## 2022-07-13 RX ORDER — LAMOTRIGINE 25 MG/1
25 TABLET ORAL DAILY
Qty: 90 TABLET | Refills: 0 | Status: SHIPPED | OUTPATIENT
Start: 2022-07-13 | End: 2022-07-14 | Stop reason: SDUPTHER

## 2022-07-14 RX ORDER — LAMOTRIGINE 25 MG/1
25 TABLET ORAL DAILY
Qty: 90 TABLET | Refills: 0 | Status: SHIPPED | OUTPATIENT
Start: 2022-07-14 | End: 2022-10-04

## 2022-07-14 NOTE — TELEPHONE ENCOUNTER
Caller: [facility] #[id number] [place], [place] - [address] AT [facility] - [phone] [place] [phone] FX    Relationship: Pharmacy      Requested Prescriptions:   Requested Prescriptions     Pending Prescriptions Disp Refills   • lamoTRIgine (LaMICtal) 25 MG tablet 90 tablet 0     Sig: Take 1 tablet by mouth Daily.        Pharmacy where request should be sent: [facility] #[id number] - [place], [place] - [address] AT [facility] - [phone] [place] [phone] FX  [facility] [address] [place] [address] [address] [facility] [phone] [facility] [phone] FX  [facility] #[id number] [place], [place] - [address] [facility] AT [facility] - [phone] [facility] [phone] FX     Additional details provided by patient: AGAPITO STATES THAT THE PATIENT IS WANTING THIS PRESCRIPTION FILLED AT [facility].  AGAPITO STATES THE PATIENT HAS TOLD HIM SHE IS COMPLETLEY OUT.       Does the patient have less than a 3 day supply:  [x] Yes  [] No    Filipe SIEGEL Rep   07/14/22 14:59 EDT

## 2022-07-19 DIAGNOSIS — Z51.81 MEDICATION MONITORING ENCOUNTER: ICD-10-CM

## 2022-07-26 DIAGNOSIS — T14.8XXA BRUISING: Primary | ICD-10-CM

## 2022-08-16 DIAGNOSIS — M25.50 POLYARTHRALGIA: Primary | ICD-10-CM

## 2022-08-16 DIAGNOSIS — G90.A POTS (POSTURAL ORTHOSTATIC TACHYCARDIA SYNDROME): ICD-10-CM

## 2022-08-20 RX ORDER — HYDROXYZINE 50 MG/1
TABLET, FILM COATED ORAL
Qty: 90 TABLET | Refills: 0 | Status: SHIPPED | OUTPATIENT
Start: 2022-08-20 | End: 2022-10-04

## 2022-08-20 RX ORDER — LEVOTHYROXINE SODIUM 0.07 MG/1
75 TABLET ORAL DAILY
Qty: 30 TABLET | Refills: 3 | Status: SHIPPED | OUTPATIENT
Start: 2022-08-20 | End: 2022-10-04 | Stop reason: SDUPTHER

## 2022-09-01 ENCOUNTER — OFFICE VISIT (OUTPATIENT)
Dept: INTERNAL MEDICINE | Facility: CLINIC | Age: 37
End: 2022-09-01

## 2022-09-01 VITALS
BODY MASS INDEX: 27.69 KG/M2 | WEIGHT: 176.4 LBS | DIASTOLIC BLOOD PRESSURE: 60 MMHG | HEART RATE: 86 BPM | OXYGEN SATURATION: 96 % | SYSTOLIC BLOOD PRESSURE: 110 MMHG | TEMPERATURE: 96.9 F | HEIGHT: 67 IN

## 2022-09-01 DIAGNOSIS — G40.909 EPILEPSY UNDETERMINED AS TO FOCAL OR GENERALIZED: Chronic | ICD-10-CM

## 2022-09-01 DIAGNOSIS — M25.50 HYPERMOBILITY ARTHRALGIA: Chronic | ICD-10-CM

## 2022-09-01 DIAGNOSIS — E03.9 HYPOTHYROIDISM (ACQUIRED): Chronic | ICD-10-CM

## 2022-09-01 DIAGNOSIS — G90.A POTS (POSTURAL ORTHOSTATIC TACHYCARDIA SYNDROME): Chronic | ICD-10-CM

## 2022-09-01 DIAGNOSIS — F90.0 ADHD (ATTENTION DEFICIT HYPERACTIVITY DISORDER), INATTENTIVE TYPE: Chronic | ICD-10-CM

## 2022-09-01 DIAGNOSIS — Z76.89 ENCOUNTER TO ESTABLISH CARE: Primary | ICD-10-CM

## 2022-09-01 DIAGNOSIS — D32.9 MENINGIOMA: Chronic | ICD-10-CM

## 2022-09-01 DIAGNOSIS — G43.109 MIGRAINE WITH AURA, NOT INTRACTABLE, WITHOUT STATUS MIGRAINOSUS: Chronic | ICD-10-CM

## 2022-09-01 PROCEDURE — 99215 OFFICE O/P EST HI 40 MIN: CPT | Performed by: NURSE PRACTITIONER

## 2022-09-01 RX ORDER — TIZANIDINE HYDROCHLORIDE 2 MG/1
2 CAPSULE, GELATIN COATED ORAL 3 TIMES DAILY
COMMUNITY
End: 2022-10-10 | Stop reason: SDUPTHER

## 2022-09-01 NOTE — PROGRESS NOTES
"Chief Complaint  Establish Care/    Subjective        Desiree Peterson presents to [facility] PRIMARY CARE  History of Present Illness  This is a 37 y/o female presenting to office for establishment of care and chronic care management. Patient is currently single and has 1 child. Patient is currently not working at this time as she is personally disabled.     Patient reports following with Heidi stone; patient reports last pap smear completed in February 2022. Patient currently not on birth control.     Patient reports hx of meningioma removal by Dr. Coats in October 2019. Patient reports since this surgery she has had a lot of issues with autoimmune disorders and autonomic disorder.     Patient follows with Dr. Harrison for history of migraines. Patient reports previously seeing [facility] [place] [facility] for POTS syndrome. Patient has concern for possible EDS-- patient reports daughter who is 16 has recent dx of hypermobility disorder. Patient's daughter has been following with [facility]'s. Patient reports mother has history of hyperextension. Patient also reports cousin has EDS/POTS. Patient is concerned for excessive bruising, hypermobility with hypertenxsion, joint dysfunction, and joint pain. Patient reports her symptoms have been ongoing for years but have gotten worse since her surgery in 2019.     Patient continues following with [facility] but has been having trouble getting appt's.  Patient is also being worked up for autism testing as well. Patient would like a referral to behavioral health.   Patient reports using adderall previously for ADHD. Patient also has used klonopin PRN for anxiety but reports she is interested in other medical treatment.             Objective   Vital Signs:  /60   Pulse 86   Temp 96.9 °F (36.1 °C)   Ht 170.2 cm (67.01\")   Wt 80 kg (176 lb 6.4 oz)   SpO2 96%   BMI 27.62 kg/m²   Estimated body mass index is 27.62 kg/m² " "as calculated from the following:    Height as of this encounter: 170.2 cm (67.01\").    Weight as of this encounter: 80 kg (176 lb 6.4 oz).          Physical Exam  Constitutional:       Appearance: Normal appearance.   HENT:      Head: Normocephalic and atraumatic.   Eyes:      Extraocular Movements: Extraocular movements intact.      Pupils: Pupils are equal, round, and reactive to light.   Cardiovascular:      Rate and Rhythm: Normal rate and regular rhythm.      Pulses: Normal pulses.      Heart sounds: Normal heart sounds. No murmur heard.    No friction rub. No gallop.   Pulmonary:      Effort: Pulmonary effort is normal. No respiratory distress.      Breath sounds: Normal breath sounds. No stridor. No wheezing, rhonchi or rales.   Abdominal:      General: Bowel sounds are normal. There is no distension.      Palpations: Abdomen is soft.      Tenderness: There is no abdominal tenderness.   Musculoskeletal:         General: Tenderness present. Normal range of motion.   Skin:     General: Skin is warm and dry.   Neurological:      General: No focal deficit present.      Mental Status: She is alert and oriented to person, place, and time. Mental status is at baseline.   Psychiatric:         Mood and Affect: Mood normal.         Thought Content: Thought content normal.         Judgment: Judgment normal.        Result Review :  The following data was reviewed by: MYAH Flannery on 09/01/2022:  Common labs    Common Labsle 4/27/22 4/27/22 4/27/22    1528 1528 1528   Glucose  89    BUN  9    Creatinine  0.73    Sodium  142    Potassium  4.5    Chloride  105    Calcium  9.1    Albumin  4.50    Total Bilirubin  0.3    Alkaline Phosphatase  68    AST (SGOT)  15    ALT (SGPT)  9    WBC 5.20     Hemoglobin 12.1     Hematocrit 38.4     Platelets 299     Total Cholesterol   175   Triglycerides   63   HDL Cholesterol   48   LDL Cholesterol    115 (A)   (A) Abnormal value                      Assessment and Plan "   Diagnoses and all orders for this visit:    1. Encounter to establish care (Primary)    2. Hypermobility arthralgia  -     Ambulatory Referral to Rheumatology    3. Epilepsy undetermined as to focal or generalized (HCC)  Assessment & Plan:  Continues on keppra.   Will be following up with Dr. Harrison.       4. Migraine with aura, not intractable, without status migrainosus  Assessment & Plan:  Will be following up with Dr. Harrison.   Takes maxalt PRN.       5. Hypothyroidism (acquired)  Assessment & Plan:  Continues on synthroid.       6. ADHD (attention deficit hyperactivity disorder), inattentive type  -     Ambulatory Referral to Behavioral Health    7. Meningioma (HCC)  Assessment & Plan:  Hx sx in 2019-- continues following with Dr. Coats for surveillance and management.       8. POTS (postural orthostatic tachycardia syndrome)  Assessment & Plan:  Schedule regular water alarms on phone-- goal of 100 ounces.   Goal of sodium intake 4-5g per day.   Compression socks/stockings and abdominal binder.   We can always get patient over to see Dr. Flores as needed.            I spent 40 minutes caring for Desiree on this date of service. This time includes time spent by me in the following activities:preparing for the visit, reviewing tests, obtaining and/or reviewing a separately obtained history, performing a medically appropriate examination and/or evaluation , counseling and educating the patient/family/caregiver, ordering medications, tests, or procedures, documenting information in the medical record and care coordination  Follow Up   Return in about 8 weeks (around 10/27/2022) for Medicare Wellness.  Patient was given instructions and counseling regarding her condition or for health maintenance advice. Please see specific information pulled into the AVS if appropriate.

## 2022-09-01 NOTE — ASSESSMENT & PLAN NOTE
Schedule regular water alarms on phone-- goal of 100 ounces.   Goal of sodium intake 4-5g per day.   Compression socks/stockings and abdominal binder.   We can always get patient over to see Dr. Flores as needed.

## 2022-10-04 RX ORDER — LEVETIRACETAM 1000 MG/1
TABLET ORAL
Qty: 180 TABLET | Refills: 0 | Status: SHIPPED | OUTPATIENT
Start: 2022-10-04 | End: 2023-03-07 | Stop reason: SDUPTHER

## 2022-10-04 RX ORDER — HYDROXYZINE 50 MG/1
TABLET, FILM COATED ORAL
Qty: 90 TABLET | Refills: 0 | Status: SHIPPED | OUTPATIENT
Start: 2022-10-04 | End: 2023-03-07 | Stop reason: SDUPTHER

## 2022-10-04 RX ORDER — LAMOTRIGINE 25 MG/1
TABLET ORAL
Qty: 90 TABLET | Refills: 0 | Status: SHIPPED | OUTPATIENT
Start: 2022-10-04 | End: 2023-03-07 | Stop reason: SDUPTHER

## 2022-10-05 RX ORDER — LEVOTHYROXINE SODIUM 0.07 MG/1
75 TABLET ORAL DAILY
Qty: 30 TABLET | Refills: 3 | Status: SHIPPED | OUTPATIENT
Start: 2022-10-05

## 2022-10-10 RX ORDER — TIZANIDINE HYDROCHLORIDE 2 MG/1
2 CAPSULE, GELATIN COATED ORAL 3 TIMES DAILY
Qty: 90 CAPSULE | Refills: 3 | Status: SHIPPED | OUTPATIENT
Start: 2022-10-10 | End: 2023-03-08

## 2022-10-10 NOTE — TELEPHONE ENCOUNTER
l.    Caller: [facility] - [place], [place] - [address] - [phone] [facility] [phone] FX    Relationship: Pharmacy    Best call back number: [phone]    Requested Prescriptions:   Requested Prescriptions     Pending Prescriptions Disp Refills   • TiZANidine (ZANAFLEX) 2 MG capsule       Sig: Take 1 capsule by mouth 3 (Three) Times a Day.        Pharmacy where request should be sent: [facility] - [place], [place] - [address] - [phone] [facility] [phone] FX     Additional details provided by patient: PHARMACY STATES REQUEST WAS SENT FOR THIS REFILL ON 10/3/2022 AND THEY WERE DOING A FOLLOW UP     Does the patient have less than a 3 day supply:  [] Yes  [x] No    Filipe Pryor Rep   10/10/22 14:50 EDT

## 2022-10-11 ENCOUNTER — TELEPHONE (OUTPATIENT)
Dept: INTERNAL MEDICINE | Facility: CLINIC | Age: 37
End: 2022-10-11

## 2022-10-11 NOTE — TELEPHONE ENCOUNTER
LEISA,  WITH [employer], CALLED STATING THAT THE PATIENT WANTED TO REQUEST ORDERS FOR THE FOLLOWING:    SERVICE DOG AND TRAINING  AT HOME PHYSICAL THERAPY  DIETICIAN  MEDICATION DISPENSING MACHINE  [employer] MEALS / MEALS ON WHEELS    LEISA ADVISED THAT ANY QUESTIONS OR FOLLOW UP BE DIRECTED TOWARDS THE PATIENT, AS SHE WAS SIMPLY THE MESSENGER FOR THE REQUESTS.    PLEASE ADVISE   [phone]

## 2022-10-13 DIAGNOSIS — K30 DELAYED GASTRIC EMPTYING: ICD-10-CM

## 2022-10-13 DIAGNOSIS — D64.89 ANEMIA DUE TO OTHER CAUSE, NOT CLASSIFIED: ICD-10-CM

## 2022-10-13 DIAGNOSIS — M25.50 HYPERMOBILITY ARTHRALGIA: Primary | ICD-10-CM

## 2022-10-13 NOTE — TELEPHONE ENCOUNTER
Orders placed for:  Referral to  caretenders-- she will need discuss infusion therapy with them, this is not something I typically management.   Referrals placed the hematology, dr. Goodwin with gastroparesis at [facility], rheumatology.   She can ask dr goodwin for referral to nutritionist with their group.   I also placed referral to  to help with services.

## 2022-10-14 ENCOUNTER — PATIENT OUTREACH (OUTPATIENT)
Dept: CASE MANAGEMENT | Facility: OTHER | Age: 37
End: 2022-10-14

## 2022-10-14 ENCOUNTER — TELEPHONE (OUTPATIENT)
Dept: INTERNAL MEDICINE | Facility: CLINIC | Age: 37
End: 2022-10-14

## 2022-10-14 ENCOUNTER — REFERRAL TRIAGE (OUTPATIENT)
Dept: CASE MANAGEMENT | Facility: OTHER | Age: 37
End: 2022-10-14

## 2022-10-14 NOTE — OUTREACH NOTE
Social Work Assessment  Questions/Answers    Flowsheet Row Most Recent Value   Referral Source physician   Reason for Consult community resources, other (see comments)   General Information Comments food resources   People in Home alone   Current Living Arrangements apartment   Primary Care Provided by self   Usual Activity Tolerance moderate   Current Activity Tolerance moderate   Medications independent   Meal Preparation independent   Housekeeping independent   Laundry independent   Shopping independent        SDOH updated and reviewed with the patient during this program:  Financial Resource Strain: Medium Risk   • Difficulty of Paying Living Expenses: Somewhat hard      Food Insecurity: Food Insecurity Present   • Worried About Running Out of Food in the Last Year: Sometimes true   • Ran Out of Food in the Last Year: Sometimes true      Transportation Needs: No Transportation Needs   • Lack of Transportation (Medical): No   • Lack of Transportation (Non-Medical): No      Housing Stability: Low Risk    • Unable to Pay for Housing in the Last Year: No   • Number of Places Lived in the Last Year: 2   • Unstable Housing in the Last Year: No      Continuing Care   [facility]    [address] [place] [address]    Phone: [phone]    Resource for: Food Insecurity     Patient Outreach    MSW outreach to patient to discuss community resources available to assist patient. Patient currently on Medicaid waiver with [employer] with  Tamela Bustos ([phone]).  Tamela is currently working on [employer] for patient, but has not been able to get this completed for the past month. MSW provided patient with list of food pantries near her home and [facility] website to look up additional food pantries. MSW provided patient with information regarding rental assistance through [facility]. Patient is provided with  transportation through her friends currently. MSW outreach to  Tamela on Monday to follow-up regarding food resources.    TAPAN MITCHELL -   Ambulatory Case Management    10/14/2022, 16:28 EDT

## 2022-10-14 NOTE — TELEPHONE ENCOUNTER
Hub staff attempted to follow warm transfer process and was unsuccessful     Caller: Desiree Peterson    Relationship to patient: Self    Best call back number:[phone]    Patient is needing: PATIENT WOULD LIKE TO SPEAK TO SOMEONE ABOUT THE REFERRALS THAT WERE RECENTLY PUT IN.    PATIENT WAS REFERRED TO A MENTAL HEALTH SPECIALIST AND SHE IS NOT ABLE TO GET IN UNTIL NEXT MONTH. THE PATIENT WOULD LIKE TO KNOW IF BETSEY IBANEZ COULD MOVE HER APPOINTMENT UP OR COULD SHE PRESCRIBE HER ADDERALL UNTIL SHE IS SEEN BY THE MENTAL HEALTH SPECIALIST.     PLEASE ADVISE

## 2022-10-14 NOTE — OUTREACH NOTE
AMBULATORY CASE MANAGEMENT NOTE    Name and Relationship of Patient/Support Person: Desiree Peterson E - Self    Patient Outreach  Received Ambulatory Case Management referral from patient's PCP regarding services of meals; home and HRCM case management.  RN-ACM outreach with patient. Reviewed with patient role of RN-ACM and HRCM case management services. Patient verbalized understanding. Patient had 9/1/22 appointment to establish care with PCP. Patient states to live alone; independent with ADL's; receiving assistance with transportation from family/friends; and ambulates without assistive device. She states to have recently ordered cane to assist with ambulation as she has history of falls; but not recently and  difficulty with pain and discomfort with lower extremities.Patient states to have medications; states to need prescription refill for Adderall refill and has  10/28/22 Neuro and 11/2/22 Behavioral Health appointment. Patient states to have Medicaid waiver program; has a  and currently attempting to have friendNatan as caregiver under the Medicaid waiver program (Melani Almeida). Patient currently states to needs assistance regarding food; financial assistance; referrals for specialists (Cardiology; Rheumatology; Gastroenterology; Behavioral Health ) and home health services.   Reviewed with patient education; home health services; referrals; follow up with physicians; availability of services through insurance; case management and 24/7 Nurse Line Telephone number. Patient verbalized understanding and states to appreciate patient outreach. Additional outreach scheduled.        Adult Patient Profile  Questions/Answers    Flowsheet Row Most Recent Value   Symptoms/Conditions Managed at Home neurological, cardiovascular, musculoskeletal, gastrointestinal   Barriers to Managing Health financial resources, social support   Cardiovascular Symptoms/Conditions other (see comments)  [POTS  (postural orthostatic tachycardia syndrome)]   Cardiovascular Management Strategies medication therapy, other (see comments)  [Physician follow up]   Gastrointestinal Symptoms/Conditions other (see comments)  [Delayed gastric emptying]   Gastrointestinal Management Strategies other (see comments), coping strategies  [physician follow up]   Musculoskeletal Symptoms/Conditions unsteady gait, other (see comments), mobility limited  [Hypermobility arthrlagia]   Musculoskeletal Management Strategies other (see comments), exercise  [Physician follow up]   Neurological Symptoms/Conditions seizures, headache  [Epilepsy, History of Meningioma (HCC)]   Neurological Management Strategies other (see comments), medication therapy  [Physician follow up]   Barriers to Taking Medication as Prescribed trouble getting refills  [Patient states to need refills for Adderall ,  has 11/2/22 Mental Health appointment and states will discuss medication. Patient states to have other medications.]   Name of Support/Comfort Primary Source Patient has listed per [employer] Karen Peterson as POA.   People in Home alone        Social Work Assessment  Questions/Answers    Flowsheet Row Most Recent Value   People in Home alone   Functional Status Comments Patient states to be independent with ADL's,  light meal preparation,  ambulates without assistive device and receiving assitance with transportation from friends.        Send Education  Questions/Answers    Flowsheet Row Most Recent Value   AWV Materials Send Materials   Other Patient Education/Resources  24/7 [facility] Nurse Call Line, Advanced Care Planning, MyChart   24/7 Nurse Call Line Education Method Verbal   ACP Education Method Verbal   MyChart Education Method Verbal      Education Documentation  Unresolved/Worsening Symptoms, taught by Kyara Farmer, RN at 10/14/2022  4:28 PM.  Learner: Patient  Readiness: Acceptance  Method: Explanation  Response: Verbalizes  Understanding    Seizure Recognition/Intervention, taught by Kyara Farmer RN at 10/14/2022  4:28 PM.  Learner: Patient  Readiness: Acceptance  Method: Explanation  Response: Verbalizes Understanding    Provider Follow-Up, taught by Kyara Farmer RN at 10/14/2022  4:28 PM.  Learner: Patient  Readiness: Acceptance  Method: Explanation  Response: Verbalizes Understanding    Medication Management, taught by Kyara Farmer RN at 10/14/2022  4:28 PM.  Learner: Patient  Readiness: Acceptance  Method: Explanation  Response: Verbalizes Understanding    unresolved or worsening symptoms, taught by Kyara Farmer RN at 10/14/2022  4:28 PM.  Learner: Patient  Readiness: Acceptance  Method: Explanation  Response: Verbalizes Understanding    coping strategies, taught by Kyara Farmer RN at 10/14/2022  4:28 PM.  Learner: Patient  Readiness: Acceptance  Method: Explanation  Response: Verbalizes Understanding          KYARA HUGHES  Ambulatory Case Management    10/14/2022, 16:28 EDT

## 2022-10-17 ENCOUNTER — PATIENT OUTREACH (OUTPATIENT)
Dept: CASE MANAGEMENT | Facility: OTHER | Age: 37
End: 2022-10-17

## 2022-10-17 NOTE — OUTREACH NOTE
Care Coordination    MSW outreach with Tamela Akash- patient's  on Medicaid waiver as requested by patient. Patient is currently on the Home and Community Based Waiver and utilizes  through [employer] case [employer]. Tamela has completed the paperwork for meals to be added to patient's Medicaid waiver through [employer] and has submitted the paperwork to [employer] directly. Tamela waiting on response from [employer] and states she will check with her lead today to check for alternative options for providing this paperwork to [employer] to be completed and service to be added. Patient also adding a caregiver that lives upstairs through [employer] and waiting on the approval for paperwork.    TAPAN MITCHELL -   Ambulatory Case Management    10/17/2022, 11:21 EDT

## 2022-10-20 DIAGNOSIS — D64.89 ANEMIA DUE TO OTHER CAUSE, NOT CLASSIFIED: Primary | ICD-10-CM

## 2022-10-20 DIAGNOSIS — D64.9 ANEMIA, UNSPECIFIED TYPE: ICD-10-CM

## 2022-10-20 DIAGNOSIS — R79.89 OTHER SPECIFIED ABNORMAL FINDINGS OF BLOOD CHEMISTRY: ICD-10-CM

## 2022-10-20 DIAGNOSIS — R79.1 ABNORMAL COAGULATION PROFILE: ICD-10-CM

## 2022-10-21 ENCOUNTER — PATIENT OUTREACH (OUTPATIENT)
Dept: CASE MANAGEMENT | Facility: OTHER | Age: 37
End: 2022-10-21

## 2022-10-25 ENCOUNTER — PATIENT OUTREACH (OUTPATIENT)
Dept: CASE MANAGEMENT | Facility: OTHER | Age: 37
End: 2022-10-25

## 2022-10-25 NOTE — OUTREACH NOTE
AMBULATORY CASE MANAGEMENT NOTE    Name and Relationship of Patient/Support Person: CoyDesiree ramirez E - Self  Patient Outreach  RN-ACM outreach with patient. Patient states to have episode of migraine headache today and resting. Patient states to be compliant with medications  and medical appointments reviewed. Patient states to have 10/28/22,PCP and neurology appointment and voiced intent to contact PCP to reschedule appointment. Patient has 11/2/22 Behavioral Health appointment; has not received Adderall refill and voiced intent to discuss with Behavioral Health. Patient states no difficulty with seizures; falls; chest pain; SOB; appetite or sleeping. Patient states to be using cane for ambulation; states to have no barriers at present regarding food or transportation. Reviewed with patient referrals regarding home health services; hematology; rheumatology and  Gastroenterology. Patient requested assistance regarding referrals. Reviewed with patient medicaid waiver services as she has not received  update and requests assistance.   Care Coordination  Care Coordination with Yanci/ PCP office regarding physician referrals and home health services. Yanci verbalized understanding and will follow up .   Care Coordination  Care Coordination with Janelle/ [facility] [phone] regarding Medicaid waiver as requested by patient of request of transfer of Medicaid waiver services and need of patient contact to Medicaid waiver  for transfer request. Patient Outreach  Relayed information to patient regarding care coordination and contact information provided. Patient verbalized understanding and states to appreciate RN-ACM assistance. Additional outreach scheduled.     Education Documentation  Joint Mobility/Strength, taught by Kyara Farmer, [place] at 10/25/2022  2:06 PM.  Learner: Patient  Readiness: Acceptance  Method: Explanation  Response: Verbalizes Understanding    Assistive/Adaptive Devices,  taught by Kyara Farmer RN at 10/25/2022  2:06 PM.  Learner: Patient  Readiness: Acceptance  Method: Explanation  Response: Verbalizes Understanding    Unresolved/Worsening Symptoms, taught by Kyara Farmer RN at 10/25/2022  2:06 PM.  Learner: Patient  Readiness: Acceptance  Method: Explanation  Response: Verbalizes Understanding    Safety, taught by Kyara Farmer RN at 10/25/2022  2:06 PM.  Learner: Patient  Readiness: Acceptance  Method: Explanation  Response: Verbalizes Understanding    Provider Follow-Up, taught by Kyara Farmer RN at 10/25/2022  2:06 PM.  Learner: Patient  Readiness: Acceptance  Method: Explanation  Response: Verbalizes Understanding    unresolved or worsening symptoms, taught by Kyara Farmer RN at 10/25/2022  2:06 PM.  Learner: Patient  Readiness: Acceptance  Method: Explanation  Response: Verbalizes Understanding    coping strategies, taught by Kyara Farmer RN at 10/25/2022  2:06 PM.  Learner: Patient  Readiness: Acceptance  Method: Explanation  Response: Verbalizes Understanding          KYARA HUGHES  Ambulatory Case Management    10/25/2022, 14:08 EDT

## 2022-11-02 ENCOUNTER — TELEMEDICINE (OUTPATIENT)
Dept: PSYCHIATRY | Facility: CLINIC | Age: 37
End: 2022-11-02

## 2022-11-02 DIAGNOSIS — F33.0 MILD EPISODE OF RECURRENT MAJOR DEPRESSIVE DISORDER: Chronic | ICD-10-CM

## 2022-11-02 DIAGNOSIS — F43.10 POST TRAUMATIC STRESS DISORDER (PTSD): Primary | Chronic | ICD-10-CM

## 2022-11-02 PROCEDURE — 90792 PSYCH DIAG EVAL W/MED SRVCS: CPT | Performed by: NURSE PRACTITIONER

## 2022-11-02 NOTE — TREATMENT PLAN
Multi-Disciplinary Problems (from Behavioral Health Treatment Plan)    Active Problems     Problem: Depression  Start Date: 11/02/22    Problem Details: The patient self-scales this problem as a 2 with 10 being the worst.        Goal Priority Start Date Expected End Date End Date    Patient will demonstrate the ability to initiate new constructive life skills outside of sessions on a consistent basis. -- 11/02/22 -- --    Goal Details: Progress toward goal:  Not appropriate to rate progress toward goal since this is the initial treatment plan.        Goal Intervention Frequency Start Date End Date    Assist patient in setting attainable activities of daily living goals. PRN 11/02/22 --    Goal Intervention Frequency Start Date End Date    Provide education about depression Q Month 11/02/22 --    Intervention Details: Duration of treatment until until discharged.        Goal Intervention Frequency Start Date End Date    Assist patient in developing healthy coping strategies. Q Month 11/02/22 --    Intervention Details: Duration of treatment until until discharged.              Problem: Post Traumatic Stress  Start Date: 11/02/22    Problem Details: The patient self-scales this problem as a 4 with 10 being the worst.        Goal Priority Start Date Expected End Date End Date    Patient will process and move through trauma in a way that improves self regard and the patients ability to function optimally in the world around them. -- 11/02/22 -- --    Goal Details: Progress toward goal:  Not appropriate to rate progress toward goal since this is the initial treatment plan.        Goal Intervention Frequency Start Date End Date    Assist patient in identifying ways that trauma has negatively impacted their view of themselves and the world. Q Month 11/02/22 --    Intervention Details: Duration of treatment until until discharged.        Goal Intervention Frequency Start Date End Date    Process trauma in the context of the  safe session environment. Q Month 11/02/22 --    Intervention Details: Duration of treatment until until discharged.        Goal Intervention Frequency Start Date End Date    Develop a plan of behavior changes that will reduce the stress of the trauma. Q Month 11/02/22 --    Intervention Details: Duration of treatment until until discharged.                           I have discussed and reviewed this treatment plan with the patient and/or guardian.  The patient has verbally agreed with this treatment plan (no signatures are obtained at today's visit as the patient is a telehealth patient and is unable to print and sign this document, therefore verbal agreement is obtained).

## 2022-11-02 NOTE — PROGRESS NOTES
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 wouldn't be surprised if she had more than ADHD, but that is all the testing focused on. Pt has another appointment with them in January and hopes they will broaden their question base and make sure ADHD is an accurate diagnosis and if there is anything additional. Pt is agreeable to wait until those results come back before starting any ADHD medication. This APRN is agreeable to treat pt if she comes back with the diagnosis, has an EKG with medical clearance, and is able to pass a UDS.   -Continue Zoloft 100 mg PO Daily for depression and PTSD from PCP. This APRN will take over prescriptions once a refill is needed.  -Continue hydroxyzine 50 mg PO Daily for anxiety from PCP. This APRN will take over prescriptions once a refill is needed.  -Continue Lamictal 25 mg PO Daily for mood stabilization from PCP. This APRN will take over prescriptions once a refill is needed.  -Continue Trazodone  mg PO QHS PRN for sleep from PCP. This APRN will take over prescriptions once a refill is needed.   -Pt is not currently taking Adderall   -Pt has a prescription for Klonopin 1 mg PO TID PRN for anxiety or seizures. This APRN has  already discussed with pt that this is not a medication this APRN will be continuing. Pt verbalized understanding stating she doesn't use it very often and it doesn't seem to be very effective for anxiety, it only makes her tired.   -This APRN ran over the allotted time; will further evaluate pt next appointment for possible bipolar disorder along with ADHD symptoms.     Medication and treatment options, both pharmacological and non-pharmacological treatment options, discussed during today's visit, including any off label use of medication. Patient acknowledged and verbally consented with current treatment plan and was educated on the importance of compliance with treatment and follow-up appointments.       MEDICATION ISSUES:    Discussed treatment plan and medication options of prescribed medication as well as the risks, benefits, any black box warnings, and side effects including potential falls, possible impaired driving, and metabolic adversities among others, including any off label use of medication. Patient is agreeable to call the office with any worsening of symptoms or onset of side effects, or if any concerns or questions arise.  The contact information for the office is made available to the patient. They may call the [facility] at [phone]. Patient is agreeable to call [phone] or go to the nearest ER should they begin having any SI/HI, or if any urgent concerns arise.        SUICIDE RISK ASSESSMENT: Unalterable demographics and a history of mental health intervention indicate this patient is in a high risk category compared to the general population. At present, the patient denies active SI/HI, intentions, or plans at this time and agrees to seek immediate care should such thoughts develop. The patient verbalizes understanding of how to access emergency care if needed and agrees to do so. Consideration of suicide risk and protective factors such as history, current  presentation, individual strengths and weaknesses, psychosocial and environmental stressors and variables, psychiatric illness and symptoms, medical conditions and pain, took place in this interview. Based on those considerations, the patient is determined: within individual baseline and presenting no imminent risk for suicide or homicide. Other recommendations: The patient does not meet the criteria for inpatient admission and is not a safety risk to self or others at today's visit. Inpatient treatment offers no significant advantages over outpatient treatment for this patient at today's visit.      SAFETY PLAN:  Patient was given ample time for questions and fully participated in treatment planning.  Patient was encouraged to call the clinic with any questions or concerns.  Patient was informed of access to emergency care. If patient were to develop any significant symptomatology, suicidal ideation, homicidal ideation, any concerns, or feel unsafe at any time they are to call the clinic and if unable to get immediate assistance should immediately call 911 or go to the nearest emergency room.  The patient is advised to remove or secure (lock away) all lethal weapons (including guns) and sharps (including razors, scissors, knives, etc.).  All medications (including any prescribed and any over the counter medications) should be stored in a safe and secured location that is not obtainable by children/adolescents.  Patient was given an opportunity and encouraged to ask questions about their medication, illness, and treatment. Patient contracted verbally for the following: If you are experiencing an emotional crisis or have thoughts of harming yourself or others, please go to your nearest local emergency room or call 911. Will continue to re-assess medication response and side effects frequently to establish efficacy and ensure safety. Risks, any black box warnings, side effects, off label usage, and benefits of  medication and treatment discussed with patient, along with potential adverse side effects of current and/or newly prescribed medication, alternative treatment options, and OTC medications.  Patient verbalized understanding of potential risks, any off label use of medication, any black box warnings, and any side effects in their own words. The patient verbalized understanding and agreed to comply with the safety plan discussed in their own words.  Patient given the number to the office. Number also available to the 24- hour suicide hotline.      MEDS ORDERED DURING VISIT:  No orders of the defined types were placed in this encounter.      Return in about 4 weeks (around 11/30/2022), or if symptoms worsen or fail to improve, for Recheck.     Treatment plan completed: 11/2/22    Progress toward goal: Not at goal    Functional Status: Mild impairment     Prognosis: Good with Ongoing Treatment        This patient will not be seen for the in person visits due to the following exception(s): It would be a hardship for this patient to see a provider in person.    It is my professional opinion that the patient is clinically stable and an in person visit will risk worsening the patient's condition creating undue hardship.             This document has been electronically signed by MYAH Childers  November 2, 2022 15:01 EDT    Some of the data in this electronic note has been brought forward from a previous encounter, any necessary changes have been made, it has been reviewed by this APRN, and it is accurate.    Please note that portions of this note were completed with a voice recognition program. Efforts were made to edit dictation, but occasionally words are mistranscribed.

## 2022-11-07 ENCOUNTER — PATIENT OUTREACH (OUTPATIENT)
Dept: CASE MANAGEMENT | Facility: OTHER | Age: 37
End: 2022-11-07

## 2022-11-07 NOTE — OUTREACH NOTE
AMBULATORY CASE MANAGEMENT NOTE    Name and Relationship of Patient/Support Person: Yanci/ PCP office - Other  Care Coordination  Care Coordination with Yanci/ PCP office regarding referrals for physician appointments and home health services. [facility]: [employer]: [phone];   Hematology: Dr. Ny La [phone]; Gastroenterology: Dr. Tiwari [phone];    Rheumatology Dr. Yosef Gardner [phone]. [facility] information has been sent to physician offices and patient to be contacted by offices with appointments.    Care Coordination   Care Coordination with [employer] [phone] stating patient is scheduled for 11/7/22 start of care with physical therapy.         PRITESH HUGEHS  Ambulatory Case Management    11/7/2022, 12:05 EST

## 2022-11-08 ENCOUNTER — PATIENT MESSAGE (OUTPATIENT)
Dept: INTERNAL MEDICINE | Facility: CLINIC | Age: 37
End: 2022-11-08

## 2022-11-08 ENCOUNTER — PATIENT OUTREACH (OUTPATIENT)
Dept: CASE MANAGEMENT | Facility: OTHER | Age: 37
End: 2022-11-08

## 2022-11-08 NOTE — OUTREACH NOTE
AMBULATORY CASE MANAGEMENT NOTE    Name and Relationship of Patient/Support Person: CoyDesiree ramirez E - Self  Patient Outreach  RN-ACM outreach with patient. Patient states to have started [facility] physical therapy yesterday and states to have been pleased with services. Patient states will be continue with physical therapy services. Patient states to have contacted Tamela Segundo's  regarding patient request from [employer] waiver services to [facility] and will be following up with  for transfer as patient has requested. Patient states has followed up with Tamela regarding [employer] also.Patiient states to be compliant with medical appointments and neurology appointment with Dr. Harrison. Patient states to have received recommendations of medications (Propranolol 10 mg BID; and Aimovig injection 1 time per month) regarding migraines.RN-ACM advised patient to contact PCP regarding recommendations. Patient verbalized understanding and voiced intent to do so.  Patient states no difficulty with seizures; falls; chest pain; SOB; appetite or sleeping. Reviewed with patient information regarding physician referrals; contact information and appointments.Patient verbalized understanding and voiced intent to follow up with physicians regarding appointments. Reviewed with patient education and patient verbalized understanding. Patient states to appreciate patient outreach. No further questions voiced at this time. Additional outreach scheduled.     Education Documentation  Safety Techniques, taught by Kyara Farmer RN at 11/8/2022  2:52 PM.  Learner: Patient  Readiness: Acceptance  Method: Explanation  Response: Verbalizes Understanding    Joint Mobility/Strength, taught by Kyaar Farmer RN at 11/8/2022  2:52 PM.  Learner: Patient  Readiness: Acceptance  Method: Explanation  Response: Verbalizes Understanding    Assistive/Adaptive Devices, taught by Kyara Farmer RN at  11/8/2022  2:52 PM.  Learner: Patient  Readiness: Acceptance  Method: Explanation  Response: Verbalizes Understanding    Safety, taught by Kyara Farmer RN at 11/8/2022  2:52 PM.  Learner: Patient  Readiness: Acceptance  Method: Explanation  Response: Verbalizes Understanding    Medication Management, taught by Kyara Farmer RN at 11/8/2022  2:52 PM.  Learner: Patient  Readiness: Acceptance  Method: Explanation  Response: Verbalizes Understanding    Energy Conservation, taught by Kyara Farmer RN at 11/8/2022  2:52 PM.  Learner: Patient  Readiness: Acceptance  Method: Explanation  Response: Verbalizes Understanding    Unresolved/Worsening Symptoms, taught by Kyara Farmer RN at 11/8/2022  2:52 PM.  Learner: Patient  Readiness: Acceptance  Method: Explanation  Response: Verbalizes Understanding    Seizure Recognition/Intervention, taught by Kyara Farmer RN at 11/8/2022  2:52 PM.  Learner: Patient  Readiness: Acceptance  Method: Explanation  Response: Verbalizes Understanding    Provider Follow-Up, taught by Kyara Farmer RN at 11/8/2022  2:52 PM.  Learner: Patient  Readiness: Acceptance  Method: Explanation  Response: Verbalizes Understanding    Medication Management, taught by Kyara Farmer RN at 11/8/2022  2:52 PM.  Learner: Patient  Readiness: Acceptance  Method: Explanation  Response: Verbalizes Understanding          KYARA HUGHES  Ambulatory Case Management    11/8/2022, 14:52 EST

## 2022-11-08 NOTE — TELEPHONE ENCOUNTER
From: Desiree Peterson  To: MYAH Flannery  Sent: 11/8/2022 3:10 PM EST  Subject: New meds    Emmie Deal!   I just wanted to update you on my dr appt with dr Harrison at [facility]. He prescribed me three new meds. I’ve been taking the propranolol but haven’t taken the others yet as one is as needed and the other is an injectable once a month that I haven’t taken yet. I’m including the pics from Karishma smith. Thanks !

## 2022-12-09 ENCOUNTER — TELEPHONE (OUTPATIENT)
Dept: INTERNAL MEDICINE | Facility: CLINIC | Age: 37
End: 2022-12-09

## 2022-12-09 NOTE — TELEPHONE ENCOUNTER
Caller: CORBY    Relationship to patient: MED ASSIST    Best call back number: [phone]    Patient is needing: CORBY FROM [facility] CALLING TO INFORM THAT PATIENT MISSED A VISIT TODAY 12/9/22 .

## 2022-12-13 DIAGNOSIS — D64.9 ANEMIA, UNSPECIFIED TYPE: Primary | ICD-10-CM

## 2023-01-11 ENCOUNTER — PATIENT OUTREACH (OUTPATIENT)
Dept: CASE MANAGEMENT | Facility: OTHER | Age: 38
End: 2023-01-11
Payer: MEDICARE

## 2023-01-11 NOTE — OUTREACH NOTE
"AMBULATORY CASE MANAGEMENT NOTE    Name and Relationship of Patient/Support Person: CoyDesiree ramirez E - Self  Patient Outreach  RN-ACM outreach with patient.Patient states to be doing \"pretty good\" . Patient states to be compliant with medications and medical appointments. Patient states to be receiving services through [facility] and  states to have follow up appointment at the end of January 2023. Patient states to be compliant with medical appointments and follow up appointments. Patient states to be receiving [employer] twice a day; receiving [employer] Waiver services; homemaking services with [employer] and states no barriers regarding food; medications or transportation. Patient states to be receiving outpatient physical therapy; ambulating with cane as needed and states no difficulty with falls. Patient states no difficulty with chest pain; SOB; appetite or sleeping. Reviewed with patient education; medical appointments and 24/7 Nurse Line Telephone number. Patient verbalized understanding. Patient states to have appreciated patient outreach and voiced intent to schedule PCP appointment. Patient declines needs for further outreach at this time. No further questions voiced at this time.     Education Documentation  Safety Techniques, taught by Kyara Farmer RN at 1/11/2023  2:39 PM.  Learner: Patient  Readiness: Acceptance  Method: Explanation  Response: Verbalizes Understanding    Mobility Aids/Assistive Devices, taught by Kyara Farmer RN at 1/11/2023  2:39 PM.  Learner: Patient  Readiness: Acceptance  Method: Explanation  Response: Verbalizes Understanding    Joint Mobility/Strength, taught by Kyara Farmer RN at 1/11/2023  2:39 PM.  Learner: Patient  Readiness: Acceptance  Method: Explanation  Response: Verbalizes Understanding    Unresolved/Worsening Symptoms, taught by Kyara Farmer RN at 1/11/2023  2:39 PM.  Learner: Patient  Readiness: Acceptance  Method: " Explanation  Response: Verbalizes Understanding    Unresolved/Worsening Symptoms, taught by Kyara Farmer, RN at 1/11/2023  2:39 PM.  Learner: Patient  Readiness: Acceptance  Method: Explanation  Response: Verbalizes Understanding    Provider Follow-Up, taught by Kyara Farmer, RN at 1/11/2023  2:39 PM.  Learner: Patient  Readiness: Acceptance  Method: Explanation  Response: Verbalizes Understanding    activity, taught by Kyara Farmer RN at 1/11/2023  2:39 PM.  Learner: Patient  Readiness: Acceptance  Method: Explanation  Response: Verbalizes Understanding          KYARA HUGHES  Ambulatory Case Management    1/11/2023, 14:41 EST

## 2023-01-26 RX ORDER — LEVOTHYROXINE SODIUM 0.07 MG/1
TABLET ORAL
Qty: 90 TABLET | OUTPATIENT
Start: 2023-01-26

## 2023-02-06 ENCOUNTER — OFFICE VISIT (OUTPATIENT)
Dept: INTERNAL MEDICINE | Facility: CLINIC | Age: 38
End: 2023-02-06
Payer: MEDICARE

## 2023-02-06 VITALS
BODY MASS INDEX: 28.72 KG/M2 | DIASTOLIC BLOOD PRESSURE: 80 MMHG | SYSTOLIC BLOOD PRESSURE: 116 MMHG | WEIGHT: 183 LBS | HEART RATE: 90 BPM | TEMPERATURE: 98 F | HEIGHT: 67 IN | OXYGEN SATURATION: 99 %

## 2023-02-06 DIAGNOSIS — F90.0 ADHD (ATTENTION DEFICIT HYPERACTIVITY DISORDER), INATTENTIVE TYPE: ICD-10-CM

## 2023-02-06 DIAGNOSIS — G43.109 MIGRAINE WITH AURA, NOT INTRACTABLE, WITHOUT STATUS MIGRAINOSUS: Primary | ICD-10-CM

## 2023-02-06 DIAGNOSIS — G90.A POTS (POSTURAL ORTHOSTATIC TACHYCARDIA SYNDROME): ICD-10-CM

## 2023-02-06 DIAGNOSIS — E03.9 HYPOTHYROIDISM (ACQUIRED): ICD-10-CM

## 2023-02-06 DIAGNOSIS — G40.909 EPILEPSY UNDETERMINED AS TO FOCAL OR GENERALIZED: ICD-10-CM

## 2023-02-06 PROCEDURE — 99214 OFFICE O/P EST MOD 30 MIN: CPT | Performed by: NURSE PRACTITIONER

## 2023-02-06 RX ORDER — PROPRANOLOL HYDROCHLORIDE 10 MG/1
TABLET ORAL
COMMUNITY
Start: 2023-01-12 | End: 2023-02-06

## 2023-02-06 RX ORDER — LAMOTRIGINE 25 MG/1
50 TABLET ORAL DAILY
COMMUNITY
End: 2023-02-06 | Stop reason: SDUPTHER

## 2023-02-06 RX ORDER — NARATRIPTAN 2.5 MG/1
TABLET ORAL
COMMUNITY
Start: 2022-10-31

## 2023-02-06 NOTE — ASSESSMENT & PLAN NOTE
Continues with liquid IV use.   Patient being referred to  for possible EDS/connective tissue d/o.

## 2023-02-06 NOTE — PROGRESS NOTES
"Chief Complaint  Back Injury (Pt states her back locked up 2 weeks ago when she was bending down ) and Facial Injury (Nose )    Subjective        Desiree Peterson presents to [facility]  History of Present Illness  This is a 36 y/o female presenting to office for f/u with chronic health conditions.     Patient is currently following with [facility]-- now on adderall; continues on lamictal, zoloft. Patient is slowly being weaned off of zoloft. Patient reports overall mentally she is doing well.     Patient continues following with h/a specialist-- using naratriptan PRN.     Patient reports hx of tweaking back 2 weeks ago-- reports she went UC; continues on zanaflex PRN; taking sparingly. Scheduled to see ENT due to chronic sinus issues; had hit nose on counter when she had her initial injury.     Patient is working on getting into see  r/t possible EDS. Patient had previously seen rheumatologist regarding this.       Objective   Vital Signs:  /80 (BP Location: Left arm, Patient Position: Sitting, Cuff Size: Adult)   Pulse 90   Temp 98 °F (36.7 °C) (Infrared)   Ht 170.2 cm (67.01\")   Wt 83 kg (183 lb)   SpO2 99%   BMI 28.65 kg/m²   Estimated body mass index is 28.65 kg/m² as calculated from the following:    Height as of this encounter: 170.2 cm (67.01\").    Weight as of this encounter: 83 kg (183 lb).             Physical Exam  Vitals and nursing note reviewed.   Constitutional:       Appearance: Normal appearance.   HENT:      Head: Normocephalic and atraumatic.      Right Ear: External ear normal.      Left Ear: External ear normal.   Eyes:      Conjunctiva/sclera: Conjunctivae normal.      Pupils: Pupils are equal, round, and reactive to light.   Cardiovascular:      Rate and Rhythm: Normal rate and regular rhythm.      Pulses: Normal pulses.      Heart sounds: Normal heart sounds. No murmur heard.    No gallop.   Pulmonary:      Effort: " Pulmonary effort is normal. No respiratory distress.      Breath sounds: Normal breath sounds. No wheezing or rales.   Musculoskeletal:         General: Normal range of motion.      Cervical back: Normal range of motion and neck supple.   Skin:     General: Skin is warm and dry.   Neurological:      General: No focal deficit present.      Mental Status: She is alert and oriented to person, place, and time. Mental status is at baseline.   Psychiatric:         Mood and Affect: Mood normal.         Thought Content: Thought content normal.         Judgment: Judgment normal.        Result Review :  The following data was reviewed by: MYAH Flannery on 02/06/2023:  Common labs    Common Labs 4/27/22 4/27/22 4/27/22 11/4/22    1528 1528 1528    Glucose  89     BUN  9     Creatinine  0.73     Sodium  142     Potassium  4.5     Chloride  105     Calcium  9.1     Albumin  4.50     Total Bilirubin  0.3     Alkaline Phosphatase  68     AST (SGOT)  15     ALT (SGPT)  9     WBC 5.20   5.23   Hemoglobin 12.1   11.3 (A)   Hematocrit 38.4   35.0   Platelets 299   246   Total Cholesterol   175    Triglycerides   63    HDL Cholesterol   48    LDL Cholesterol    115 (A)    (A) Abnormal value                         Assessment and Plan   Diagnoses and all orders for this visit:    1. Migraine with aura, not intractable, without status migrainosus (Primary)  Assessment & Plan:  Continues on amerge PRN.   Following with h/a specialist.           2. Epilepsy undetermined as to focal or generalized (HCC)  Assessment & Plan:  Continues on keppra.       3. ADHD (attention deficit hyperactivity disorder), inattentive type  Assessment & Plan:  Continues on adderall.   Following with [facility].       4. Hypothyroidism (acquired)  Assessment & Plan:  Continues on synthroid.       5. POTS (postural orthostatic tachycardia syndrome)  Assessment & Plan:  Continues with liquid IV use.   Patient being referred to  for  possible EDS/connective tissue d/o.              Follow Up   Return in about 1 year (around 2/6/2024) for Medicare Wellness.  Patient was given instructions and counseling regarding her condition or for health maintenance advice. Please see specific information pulled into the AVS if appropriate.

## 2023-02-10 ENCOUNTER — PATIENT MESSAGE (OUTPATIENT)
Dept: INTERNAL MEDICINE | Facility: CLINIC | Age: 38
End: 2023-02-10
Payer: MEDICARE

## 2023-02-15 ENCOUNTER — TELEPHONE (OUTPATIENT)
Dept: INTERNAL MEDICINE | Facility: CLINIC | Age: 38
End: 2023-02-15
Payer: MEDICARE

## 2023-02-15 NOTE — TELEPHONE ENCOUNTER
Caller: ARABELLA    Relationship:     Best call back number: [phone]    What is the best time to reach you: ANYTIME     Who are you requesting to speak with (clinical staff, provider,  specific staff member): MA        What was the call regarding: ARABELLA FROM [employer] IS CALLING IN THEY NEED TO GET A VERBAL CLARIFICATION AND A NEED ON WHY PATIENT NEEDS LIVE IN AID.    Do you require a callback: YES

## 2023-03-07 ENCOUNTER — TELEPHONE (OUTPATIENT)
Dept: INTERNAL MEDICINE | Facility: CLINIC | Age: 38
End: 2023-03-07
Payer: MEDICARE

## 2023-03-07 RX ORDER — LAMOTRIGINE 25 MG/1
25 TABLET ORAL DAILY
Qty: 90 TABLET | Refills: 0 | Status: SHIPPED | OUTPATIENT
Start: 2023-03-07

## 2023-03-07 RX ORDER — HYDROXYZINE 50 MG/1
50 TABLET, FILM COATED ORAL DAILY
Qty: 90 TABLET | Refills: 0 | Status: SHIPPED | OUTPATIENT
Start: 2023-03-07

## 2023-03-07 RX ORDER — LEVETIRACETAM 1000 MG/1
1000 TABLET ORAL 2 TIMES DAILY
Qty: 180 TABLET | Refills: 0 | Status: SHIPPED | OUTPATIENT
Start: 2023-03-07

## 2023-03-07 NOTE — TELEPHONE ENCOUNTER
Caller: CoyDesiree ramirez    Relationship: Self    Best call back number: [phone] (Mobile)    Requested Prescriptions:   Requested Prescriptions     Pending Prescriptions Disp Refills   • hydrOXYzine (ATARAX) 50 MG tablet 90 tablet 0     Sig: Take 1 tablet by mouth Daily.   • lamoTRIgine (LaMICtal) 25 MG tablet 90 tablet 0     Sig: Take 1 tablet by mouth Daily.   • levETIRAcetam (KEPPRA) 1000 MG tablet 180 tablet 0     Sig: Take 1 tablet by mouth 2 (Two) Times a Day.   • sertraline (ZOLOFT) 50 MG tablet 180 tablet 0     Sig: Take 2 tablets by mouth Daily.        Pharmacy where request should be sent: [facility] - [place], [place] - [address] - [phone] [facility] [phone] FX     Additional details provided by patient: PATIENT IS CHANGING TO NEW PHARMACY AND NEEDS REFILLS SENT TO THEM ASAP    Does the patient have less than a 3 day supply:  [x] Yes  [] No    Filipe Clements Rep   03/07/23 12:20 EST

## 2023-03-08 RX ORDER — TIZANIDINE 2 MG/1
2 TABLET ORAL 3 TIMES DAILY PRN
Qty: 90 TABLET | Refills: 0 | Status: SHIPPED | OUTPATIENT
Start: 2023-03-08

## 2023-04-20 ENCOUNTER — PATIENT MESSAGE (OUTPATIENT)
Dept: INTERNAL MEDICINE | Facility: CLINIC | Age: 38
End: 2023-04-20
Payer: MEDICARE

## 2023-04-20 DIAGNOSIS — M54.6 CHRONIC BILATERAL THORACIC BACK PAIN: Primary | ICD-10-CM

## 2023-04-20 DIAGNOSIS — M54.50 CHRONIC BILATERAL LOW BACK PAIN WITHOUT SCIATICA: ICD-10-CM

## 2023-04-20 DIAGNOSIS — G89.29 CHRONIC BILATERAL LOW BACK PAIN WITHOUT SCIATICA: ICD-10-CM

## 2023-04-20 DIAGNOSIS — G89.29 CHRONIC BILATERAL THORACIC BACK PAIN: Primary | ICD-10-CM

## 2023-04-20 NOTE — TELEPHONE ENCOUNTER
From: Desiree Peterson  To: Toyin Red  Sent: 4/20/2023 11:08 AM EDT  Subject: Back issues    Hi!   I visited a few months ago when I threw my back out. It seems to be happening now few couple of months. We had the X-ray before, but is it possible to get an MRI or CT or something? It’s the same again. Standing hurts, I can’t stand up straight, it’s painful to sit and hurting in about 3 places. Low/hips, mid back, and between my shoulder blades. The biggest problem still being the lower so I can’t bend and moving is excruciating.     I’m afraid if I go to the ER or immediate care that I’ll have to stand or sit for hours again.     Thank you so much!     Desiree

## 2023-05-02 ENCOUNTER — PATIENT MESSAGE (OUTPATIENT)
Dept: INTERNAL MEDICINE | Facility: CLINIC | Age: 38
End: 2023-05-02
Payer: MEDICARE

## 2023-05-02 DIAGNOSIS — G89.29 CHRONIC BILATERAL LOW BACK PAIN WITHOUT SCIATICA: ICD-10-CM

## 2023-05-02 DIAGNOSIS — E03.9 HYPOTHYROIDISM (ACQUIRED): Primary | ICD-10-CM

## 2023-05-02 DIAGNOSIS — M54.6 CHRONIC BILATERAL THORACIC BACK PAIN: ICD-10-CM

## 2023-05-02 DIAGNOSIS — M54.50 CHRONIC BILATERAL LOW BACK PAIN WITHOUT SCIATICA: ICD-10-CM

## 2023-05-02 DIAGNOSIS — G89.29 CHRONIC BILATERAL THORACIC BACK PAIN: ICD-10-CM

## 2023-05-03 ENCOUNTER — HOSPITAL ENCOUNTER (OUTPATIENT)
Dept: MRI IMAGING | Facility: HOSPITAL | Age: 38
Discharge: HOME | End: 2023-05-03
Payer: MEDICARE

## 2023-05-03 DIAGNOSIS — G89.29 CHRONIC BILATERAL LOW BACK PAIN WITHOUT SCIATICA: ICD-10-CM

## 2023-05-03 DIAGNOSIS — M54.50 CHRONIC BILATERAL LOW BACK PAIN WITHOUT SCIATICA: ICD-10-CM

## 2023-05-03 DIAGNOSIS — G89.29 CHRONIC BILATERAL THORACIC BACK PAIN: ICD-10-CM

## 2023-05-03 DIAGNOSIS — M54.6 CHRONIC BILATERAL THORACIC BACK PAIN: ICD-10-CM

## 2023-05-03 PROCEDURE — 72146 MRI CHEST SPINE W/O DYE: CPT

## 2023-05-03 PROCEDURE — 72148 MRI LUMBAR SPINE W/O DYE: CPT

## 2023-05-03 NOTE — TELEPHONE ENCOUNTER
From: Desiree Peterson  To: Toyin Red  Sent: 5/2/2023 11:49 AM EDT  Subject: Endocrinologist     Hi!   I was wondering if I could be referred to an endocrinologist for my thyroid as I haven’t had it looked at it a while.     Thank you!     Desiree

## 2023-05-04 NOTE — PROGRESS NOTES
Please let patient know--  MRI showed some scoliosis in both thoracic and lumbar. There is a disc protrustion at L5-S1 along with a hemangioma at L2. I would recommend a referral to spine specialty if patient is agreeable. If she is, I'll send a referral.

## 2023-05-11 RX ORDER — LEVOTHYROXINE SODIUM 0.07 MG/1
75 TABLET ORAL DAILY
Qty: 30 TABLET | Refills: 3 | Status: SHIPPED | OUTPATIENT
Start: 2023-05-11

## 2023-05-11 NOTE — TELEPHONE ENCOUNTER
Caller: Desiree Peterson    Relationship: Self    Best call back number:     Requested Prescriptions:   Requested Prescriptions     Pending Prescriptions Disp Refills   • levothyroxine (SYNTHROID, LEVOTHROID) 75 MCG tablet 30 tablet 3     Sig: Take 1 tablet by mouth Daily.        Pharmacy where request should be sent:  [facility]  [address] [place] [address] [phone]    Last office visit with prescribing clinician: 2/6/2023   Last telemedicine visit with prescribing clinician: 3/7/2023   Next office visit with prescribing clinician: Visit date not found     Additional details provided by patient:     Does the patient have less than a 3 day supply:  [x] Yes  [] No    Would you like a call back once the refill request has been completed: [x] Yes [] No    If the office needs to give you a call back, can they leave a voicemail: [x] Yes [] No    Filipe Burton Rep   05/11/23 13:42 EDT

## 2023-06-22 PROBLEM — M54.16 LUMBAR RADICULOPATHY: Status: ACTIVE | Noted: 2023-06-22

## 2023-06-23 ENCOUNTER — PATIENT ROUNDING (BHMG ONLY) (OUTPATIENT)
Dept: ENDOCRINOLOGY | Age: 38
End: 2023-06-23

## 2023-07-27 ENCOUNTER — HOSPITAL ENCOUNTER (OUTPATIENT)
Dept: MRI IMAGING | Facility: HOSPITAL | Age: 38
Discharge: HOME | End: 2023-07-27
Payer: MEDICARE

## 2023-07-27 ENCOUNTER — HOSPITAL ENCOUNTER (OUTPATIENT)
Dept: ULTRASOUND IMAGING | Facility: HOSPITAL | Age: 38
Discharge: HOME | End: 2023-07-27
Payer: MEDICARE

## 2023-07-27 DIAGNOSIS — E04.2 MULTIPLE THYROID NODULES: ICD-10-CM

## 2023-07-27 DIAGNOSIS — D32.9 MENINGIOMA: ICD-10-CM

## 2023-07-27 PROCEDURE — A9577 INJ MULTIHANCE: HCPCS | Performed by: NEUROLOGICAL SURGERY

## 2023-07-27 PROCEDURE — 76536 US EXAM OF HEAD AND NECK: CPT

## 2023-07-27 PROCEDURE — 70553 MRI BRAIN STEM W/O & W/DYE: CPT

## 2023-07-27 PROCEDURE — 0 GADOBENATE DIMEGLUMINE 529 MG/ML SOLUTION: Performed by: NEUROLOGICAL SURGERY

## 2023-07-27 RX ADMIN — GADOBENATE DIMEGLUMINE 18 ML: 529 INJECTION, SOLUTION INTRAVENOUS at 14:27

## 2023-07-28 ENCOUNTER — TELEPHONE (OUTPATIENT)
Dept: NEUROSURGERY | Facility: CLINIC | Age: 38
End: 2023-07-28
Payer: MEDICARE

## 2023-07-28 NOTE — TELEPHONE ENCOUNTER
PATIENT: ZULEIKA AL    PROVIDER:DR. RAE    PATIENT CALLED ABOUT HER MRI RESULTS. SHE IS A LITTLE CONCERNED BECAUSE SHE HAS NEVER HAD TO SCHEDULE A VISIT IN ORDER TO GET THE RESULTS BEFORE, AND WITH HER MEDICAL HISTORY, THIS MAKES HER A LITTLE WORRIED. I ATTEMPTED TO GET HER TO THE CLINICAL OFFICE TO SPEAK WITH SOMEONE, BUT WAS UNABLE. SHE WOULD LIKE TO KNOW IF SHE HAS REASON TO BE CONCERNED. PLEASE CALL THE PATIENT BACK AS SOON AS POSSIBLE. THANKS.    PATIENT CALL BACK # [phone]

## 2023-07-28 NOTE — TELEPHONE ENCOUNTER
S/w patient and informed of appt on 08/24/2023 with Dr. Coats    ----- Message from Nas Coats MD sent at 7/28/2023  9:36 AM EDT -----  I think this lady needs a follow-up appointment with me after her MRI  ----- Message -----  From: Sita, Rad Results [place] In  Sent: 7/28/2023   6:55 AM EDT  To: Nas Coats MD

## 2023-07-28 NOTE — TELEPHONE ENCOUNTER
S/w patient and informed her that per Dr. Coats he just wanted to see her in the office for a routine F/U

## 2023-08-14 RX ORDER — TIZANIDINE 2 MG/1
2 TABLET ORAL 3 TIMES DAILY PRN
Qty: 90 TABLET | Refills: 0 | Status: SHIPPED | OUTPATIENT
Start: 2023-08-14

## 2023-08-14 NOTE — TELEPHONE ENCOUNTER
Rx Refill Note  Requested Prescriptions     Pending Prescriptions Disp Refills    tiZANidine (ZANAFLEX) 2 MG tablet 90 tablet 0     Sig: Take 1 tablet by mouth 3 (Three) Times a Day As Needed for Muscle Spasms.      Last office visit with prescribing clinician: 2/6/2023   Last telemedicine visit with prescribing clinician: Visit date not found   Next office visit with prescribing clinician: Visit date not found

## 2023-08-17 ENCOUNTER — PATIENT MESSAGE (OUTPATIENT)
Dept: INTERNAL MEDICINE | Facility: CLINIC | Age: 38
End: 2023-08-17
Payer: MEDICARE

## 2023-08-17 DIAGNOSIS — L70.0 CYSTIC ACNE: ICD-10-CM

## 2023-08-17 DIAGNOSIS — L90.5 SCAR: Primary | ICD-10-CM

## 2023-08-22 RX ORDER — TIZANIDINE 2 MG/1
TABLET ORAL
Qty: 90 TABLET | Refills: 0 | OUTPATIENT
Start: 2023-08-22

## 2023-08-24 ENCOUNTER — OFFICE VISIT (OUTPATIENT)
Dept: NEUROSURGERY | Facility: CLINIC | Age: 38
End: 2023-08-24
Payer: MEDICARE

## 2023-08-24 DIAGNOSIS — D32.9 MENINGIOMA: Primary | ICD-10-CM

## 2023-08-24 PROCEDURE — 99213 OFFICE O/P EST LOW 20 MIN: CPT | Performed by: NEUROLOGICAL SURGERY

## 2023-08-24 PROCEDURE — 1160F RVW MEDS BY RX/DR IN RCRD: CPT | Performed by: NEUROLOGICAL SURGERY

## 2023-08-24 PROCEDURE — 1159F MED LIST DOCD IN RCRD: CPT | Performed by: NEUROLOGICAL SURGERY

## 2023-08-24 NOTE — PROGRESS NOTES
Subjective   Patient ID: Desiree Peterson is a 37 y.o. female is here today for follow-up with a new MRI Brain done on 07/27/2023 @ [facility].    Today patient states that she feels well overall, patient denies HA's, visual changes    History of Present Illness    This patient returns today.  She is doing well.  She really has no particular complaints at all.    The following portions of the patient's history were reviewed and updated as appropriate: allergies, current medications, past family history, past medical history, past social history, past surgical history, and problem list.    Review of Systems   Constitutional:  Negative for chills and fever.   HENT:  Negative for dental problem.    Eyes:  Negative for visual disturbance.   Neurological:  Positive for dizziness and light-headedness. Negative for headaches.     I reviewed the review of systems listed by the patient and discussed by my MA    Objective     There were no vitals filed for this visit.  There is no height or weight on file to calculate BMI.    Tobacco Use: Medium Risk    Smoking Tobacco Use: Former    Smokeless Tobacco Use: Never    Passive Exposure: Not on file          Physical Exam  Neurological:      Mental Status: She is alert and oriented to person, place, and time.     Neurologic Exam     Mental Status   Oriented to person, place, and time.         Assessment & Plan   Independent Review of Radiographic Studies:      I personally reviewed the images from the following studies.    I reviewed her MRI which was done on 27 July of this year.  This shows no evidence of residual or recurrent tumor.    Medical Decision Making:      I told the patient we should scan her again in 2 years.  She agrees.    Diagnoses and all orders for this visit:    1. Meningioma (Primary)  -     MRI Brain With & Without Contrast; Future      Return in about 2 years (around 8/24/2025).

## 2023-09-01 RX ORDER — LEVOTHYROXINE SODIUM 0.07 MG/1
75 TABLET ORAL DAILY
Qty: 30 TABLET | Refills: 3 | Status: SHIPPED | OUTPATIENT
Start: 2023-09-01

## 2023-10-09 NOTE — PROGRESS NOTES
Patient out of ICU. Will sign off. Please call as needed.   Repair Anesthesia Method: local infiltration Cheiloplasty (Complex) Text: A decision was made to reconstruct the defect with a  cheiloplasty.  The defect was undermined extensively.  Additional orbicularis oris muscle was excised with a 15 blade scalpel.  The defect was converted into a full thickness wedge to facilite a better cosmetic result.  Small vessels were then tied off with 5-0 monocyrl. The orbicularis oris, superficial fascia, adipose and dermis were then reapproximated.  After the deeper layers were approximated the epidermis was reapproximated with particular care given to realign the vermilion border.

## 2023-11-23 ENCOUNTER — TELEMEDICINE (OUTPATIENT)
Dept: FAMILY MEDICINE CLINIC | Facility: TELEHEALTH | Age: 38
End: 2023-11-23
Payer: MEDICARE

## 2023-11-23 DIAGNOSIS — J04.0 LARYNGITIS: ICD-10-CM

## 2023-11-23 DIAGNOSIS — J01.40 ACUTE PANSINUSITIS, RECURRENCE NOT SPECIFIED: Primary | ICD-10-CM

## 2023-11-23 RX ORDER — PREDNISONE 20 MG/1
20 TABLET ORAL 2 TIMES DAILY
Qty: 10 TABLET | Refills: 0 | Status: SHIPPED | OUTPATIENT
Start: 2023-11-23 | End: 2023-11-28

## 2023-11-23 RX ORDER — GUAIFENESIN 600 MG/1
600 TABLET, EXTENDED RELEASE ORAL 2 TIMES DAILY
Qty: 28 TABLET | Refills: 0 | Status: SHIPPED | OUTPATIENT
Start: 2023-11-23 | End: 2023-12-07

## 2023-11-23 RX ORDER — AZITHROMYCIN 250 MG/1
TABLET, FILM COATED ORAL
Qty: 6 TABLET | Refills: 0 | Status: SHIPPED | OUTPATIENT
Start: 2023-11-23

## 2023-11-23 NOTE — PROGRESS NOTES
You have chosen to receive care through a telehealth visit.  Do you consent to use a video/audio connection for your medical care today? Yes     HPI  Desiree Peterson is a 37 y.o. female  presents with complaint of sore throat, sinus problems. She reports that she is so sick with sore throat, mucus, earache and drainage. Additional symptoms that she is having are noted in the ROS portion of this visit. Her symptoms started over a week ago. No exposure to illnesses that she is aware of at this time and no one has caught this from her. Over the counter she has tried Dayquil, Nyquil, throat spray, saline lubricant nasal spray and mucinex.     Review of Systems   Constitutional:  Positive for chills, fatigue and fever.   HENT:  Positive for congestion (green, bloody), ear pain (bialteral >right), postnasal drip, rhinorrhea, sinus pressure, sinus pain, sneezing and voice change. Negative for sore throat. Trouble swallowing: hoarse.   Respiratory:  Positive for cough. Negative for chest tightness, shortness of breath and wheezing.    Gastrointestinal:  Positive for diarrhea. Negative for nausea.   Musculoskeletal:  Positive for myalgias.   Neurological:  Positive for headaches.       Past Medical History:   Diagnosis Date    Abnormal menses     ADHD     Anxiety and depression     Atrial flutter     Bartholin cyst     X2    Bipolar affective disorder, current episode mixed 11/20/2020    Brain tumor     Cervical pain (neck)     Cyst, sinus nasal     Dumping syndrome     Heart murmur     History of anemia     History of epilepsy     Hypothyroid     Meningioma     Mid back pain     Migraines     Panic attacks     POTS (postural orthostatic tachycardia syndrome)     Seizures     SOB (shortness of breath)     Syncope and collapse     Thyroid nodule     Tinnitus of both ears        Family History   Problem Relation Age of Onset    Thyroid disease Mother     Mental illness Mother     Hypertension Mother     Cancer Mother      Arthritis Mother     Depression Mother     Anxiety disorder Mother     Heart failure Mother     Diabetes Mother     Hypertension Father     Cancer Father         Renal with metastasis    Arthritis Father     Kidney cancer Father     Migraines Father     Anxiety disorder Brother     Ulcers Brother     Mental illness Maternal Aunt     Prostate cancer Paternal Grandfather     Cervical cancer Cousin     Ovarian cancer Cousin     Thyroid disease Cousin     Malig Hyperthermia Neg Hx        Social History     Socioeconomic History    Marital status: Single    Number of children: 1    Years of education: College   Tobacco Use    Smoking status: Former     Packs/day: 1.00     Years: 3.00     Additional pack years: 0.00     Total pack years: 3.00     Types: Cigarettes     Start date:      Quit date: 10/15/2019     Years since quittin.1    Smokeless tobacco: Never    Tobacco comments:     vaping    Substance and Sexual Activity    Alcohol use: No    Drug use: Not Currently     Types: Marijuana     Comment: on occasion    Sexual activity: Not Currently     Partners: Female       Desiree Peterson  reports that she quit smoking about 4 years ago. Her smoking use included cigarettes. She started smoking about 7 years ago. She has a 3.00 pack-year smoking history. She has never used smokeless tobacco..     There were no vitals taken for this visit.    PHYSICAL EXAM  Physical Exam   Constitutional: She is oriented to person, place, and time. She appears well-developed.   HENT:   Head: Normocephalic and atraumatic.   Nose: Congestion present. Right sinus exhibits maxillary sinus tenderness (patient directed exam) and frontal sinus tenderness (patient directed exam). Left sinus exhibits maxillary sinus tenderness and frontal sinus tenderness (patient directed exam).   Eyes: Lids are normal. Right eye exhibits no discharge. Left eye exhibits no discharge. Right conjunctiva is not injected. Left conjunctiva is not injected.    Pulmonary/Chest:  No respiratory distress.  Neurological: She is alert and oriented to person, place, and time. No cranial nerve deficit.   Psychiatric: She has a normal mood and affect. Her speech is normal and behavior is normal. Judgment and thought content normal.       Results for orders placed or performed in visit on 06/20/23   TSH    Specimen: Blood   Result Value Ref Range    TSH 0.676 0.270 - 4.200 uIU/mL   T4, Free    Specimen: Blood   Result Value Ref Range    Free T4 1.38 0.93 - 1.70 ng/dL   Thyroid Peroxidase Antibody    Specimen: Blood   Result Value Ref Range    Thyroid Peroxidase Antibody <9 0 - 34 IU/mL   Hemoglobin A1c   Result Value Ref Range    Hemoglobin A1C 5.20 4.80 - 5.60 %       Diagnoses and all orders for this visit:    1. Acute pansinusitis, recurrence not specified (Primary)    2. Laryngitis    Other orders  -     predniSONE (DELTASONE) 20 MG tablet; Take 1 tablet by mouth 2 (Two) Times a Day for 5 days.  Dispense: 10 tablet; Refill: 0  -     guaiFENesin (Mucinex) 600 MG 12 hr tablet; Take 1 tablet by mouth 2 (Two) Times a Day for 14 days.  Dispense: 28 tablet; Refill: 0  -     azithromycin (Zithromax) 250 MG tablet; Take 2 tablets the first day, then 1 tablet daily for 4 days.  Dispense: 6 tablet; Refill: 0    Probiotics for two weeks related to taking antibiotics. The pharmacist can help you with this if needed. Do not take within two hours of antibiotic.  Mucinex with plenty of fluids especially water to thin secretions and help with congestion.  Take prednisone with food and not too close to bedtime as it may keep you awake at night  Do not take prednisone with nsaids such as ibuprofen, aleve, or aspirin  May take tylenol for pain or fever  Try to let your voice rest  Hydrate well  May gargle with warm salt water  May try hot tea with lemon and honey    FOLLOW-UP  If symptoms worsen or persist follow up with PCP, [facility] or Urgent Care    Patient verbalizes understanding of  medication dosage, comfort measures, instructions for treatment and follow-up.    Paula SCHILLING Jose J, APRN  11/23/2023  11:11 EST    The use of a video visit has been reviewed with the patient and verbal informed consent has been obtained. Myself and Desiree Peterson participated in this visit. The patient is located in [address] [place] [address].    I am located in [place], [place]. [employer] and [employer] were utilized. I spent 25 minutes in the patient's chart for this visit.

## 2024-01-02 RX ORDER — HYDROXYZINE 50 MG/1
50 TABLET, FILM COATED ORAL DAILY
Qty: 60 TABLET | Refills: 3 | Status: SHIPPED | OUTPATIENT
Start: 2024-01-02

## 2024-01-02 NOTE — TELEPHONE ENCOUNTER
Rx Refill Note  Requested Prescriptions     Pending Prescriptions Disp Refills    hydrOXYzine (ATARAX) 50 MG tablet [Pharmacy Med Name: HYDROXYZINE HCL 50MG TABS (WHITE)] 60 tablet      Sig: TAKE 1 TABLET BY MOUTH DAILY      Last office visit with prescribing clinician: 2/6/2023   Last telemedicine visit with prescribing clinician: Visit date not found   Next office visit with prescribing clinician: Visit date not found                         Would you like a call back once the refill request has been completed: [] Yes [] No    If the office needs to give you a call back, can they leave a voicemail: [] Yes [] No    Peyton Ha  01/02/24, 08:13 EST

## 2024-01-03 RX ORDER — LEVOTHYROXINE SODIUM 0.07 MG/1
75 TABLET ORAL DAILY
Qty: 30 TABLET | Refills: 6 | Status: SHIPPED | OUTPATIENT
Start: 2024-01-03

## 2024-03-14 NOTE — TELEPHONE ENCOUNTER
Patients psych had put her on Abilify 5mg which she felt better on then he bumped up to 10mg states she could not tolerate the higher dose so he changed her to Zyprexa 2 weeks ago she is gaining weight and feels manic on this med, she called their office and was told he is out until first of year she would need to see him then. She said she is concerned about the way she feels and want to know if you can advise on what to do   cardiac monitor cardiac monitor cardiac monitor pulse oximetry

## 2024-08-12 ENCOUNTER — HOSPITAL ENCOUNTER (EMERGENCY)
Facility: HOSPITAL | Age: 39
Discharge: HOME | End: 2024-08-12
Attending: EMERGENCY MEDICINE
Payer: MEDICARE

## 2024-08-12 ENCOUNTER — APPOINTMENT (OUTPATIENT)
Dept: CT IMAGING | Facility: HOSPITAL | Age: 39
End: 2024-08-12
Payer: MEDICARE

## 2024-08-12 VITALS
SYSTOLIC BLOOD PRESSURE: 128 MMHG | TEMPERATURE: 97.5 F | OXYGEN SATURATION: 100 % | BODY MASS INDEX: 33.34 KG/M2 | WEIGHT: 220 LBS | RESPIRATION RATE: 16 BRPM | DIASTOLIC BLOOD PRESSURE: 88 MMHG | HEIGHT: 68 IN | HEART RATE: 61 BPM

## 2024-08-12 DIAGNOSIS — G43.909 MIGRAINE WITHOUT STATUS MIGRAINOSUS, NOT INTRACTABLE, UNSPECIFIED MIGRAINE TYPE: Primary | ICD-10-CM

## 2024-08-12 LAB
ALBUMIN SERPL-MCNC: 3.9 G/DL (ref 3.5–5.2)
ALBUMIN/GLOB SERPL: 1.3 G/DL
ALP SERPL-CCNC: 71 U/L (ref 39–117)
ALT SERPL W P-5'-P-CCNC: 13 U/L (ref 1–33)
ANION GAP SERPL CALCULATED.3IONS-SCNC: 9.6 MMOL/L (ref 5–15)
AST SERPL-CCNC: 14 U/L (ref 1–32)
B PARAPERT DNA SPEC QL NAA+PROBE: NOT DETECTED
B PERT DNA SPEC QL NAA+PROBE: NOT DETECTED
BACTERIA UR QL AUTO: ABNORMAL /HPF
BASOPHILS # BLD AUTO: 0.04 10*3/MM3 (ref 0–0.2)
BASOPHILS NFR BLD AUTO: 0.7 % (ref 0–1.5)
BILIRUB SERPL-MCNC: 0.2 MG/DL (ref 0–1.2)
BILIRUB UR QL STRIP: NEGATIVE
BUN SERPL-MCNC: 10 MG/DL (ref 6–20)
BUN/CREAT SERPL: 13.7 (ref 7–25)
C PNEUM DNA NPH QL NAA+NON-PROBE: NOT DETECTED
CALCIUM SPEC-SCNC: 9.5 MG/DL (ref 8.6–10.5)
CHLORIDE SERPL-SCNC: 104 MMOL/L (ref 98–107)
CLARITY UR: CLEAR
CO2 SERPL-SCNC: 26.4 MMOL/L (ref 22–29)
COLOR UR: ABNORMAL
CREAT SERPL-MCNC: 0.73 MG/DL (ref 0.57–1)
DEPRECATED RDW RBC AUTO: 38.9 FL (ref 37–54)
EGFRCR SERPLBLD CKD-EPI 2021: 108.8 ML/MIN/1.73
EOSINOPHIL # BLD AUTO: 0.11 10*3/MM3 (ref 0–0.4)
EOSINOPHIL NFR BLD AUTO: 1.9 % (ref 0.3–6.2)
ERYTHROCYTE [DISTWIDTH] IN BLOOD BY AUTOMATED COUNT: 12.8 % (ref 12.3–15.4)
FLUAV SUBTYP SPEC NAA+PROBE: NOT DETECTED
FLUBV RNA ISLT QL NAA+PROBE: NOT DETECTED
GLOBULIN UR ELPH-MCNC: 3 GM/DL
GLUCOSE SERPL-MCNC: 96 MG/DL (ref 65–99)
GLUCOSE UR STRIP-MCNC: NEGATIVE MG/DL
HADV DNA SPEC NAA+PROBE: NOT DETECTED
HCG SERPL QL: NEGATIVE
HCOV 229E RNA SPEC QL NAA+PROBE: NOT DETECTED
HCOV HKU1 RNA SPEC QL NAA+PROBE: NOT DETECTED
HCOV NL63 RNA SPEC QL NAA+PROBE: NOT DETECTED
HCOV OC43 RNA SPEC QL NAA+PROBE: NOT DETECTED
HCT VFR BLD AUTO: 39.1 % (ref 34–46.6)
HGB BLD-MCNC: 12.8 G/DL (ref 12–15.9)
HGB UR QL STRIP.AUTO: ABNORMAL
HMPV RNA NPH QL NAA+NON-PROBE: NOT DETECTED
HOLD SPECIMEN: NORMAL
HPIV1 RNA ISLT QL NAA+PROBE: NOT DETECTED
HPIV2 RNA SPEC QL NAA+PROBE: NOT DETECTED
HPIV3 RNA NPH QL NAA+PROBE: NOT DETECTED
HPIV4 P GENE NPH QL NAA+PROBE: NOT DETECTED
HYALINE CASTS UR QL AUTO: ABNORMAL /LPF
IMM GRANULOCYTES # BLD AUTO: 0.01 10*3/MM3 (ref 0–0.05)
IMM GRANULOCYTES NFR BLD AUTO: 0.2 % (ref 0–0.5)
KETONES UR QL STRIP: NEGATIVE
LEUKOCYTE ESTERASE UR QL STRIP.AUTO: ABNORMAL
LIPASE SERPL-CCNC: 28 U/L (ref 13–60)
LYMPHOCYTES # BLD AUTO: 1.23 10*3/MM3 (ref 0.7–3.1)
LYMPHOCYTES NFR BLD AUTO: 21.2 % (ref 19.6–45.3)
M PNEUMO IGG SER IA-ACNC: NOT DETECTED
MCH RBC QN AUTO: 27.4 PG (ref 26.6–33)
MCHC RBC AUTO-ENTMCNC: 32.7 G/DL (ref 31.5–35.7)
MCV RBC AUTO: 83.7 FL (ref 79–97)
MONOCYTES # BLD AUTO: 0.24 10*3/MM3 (ref 0.1–0.9)
MONOCYTES NFR BLD AUTO: 4.1 % (ref 5–12)
NEUTROPHILS NFR BLD AUTO: 4.17 10*3/MM3 (ref 1.7–7)
NEUTROPHILS NFR BLD AUTO: 71.9 % (ref 42.7–76)
NITRITE UR QL STRIP: NEGATIVE
NRBC BLD AUTO-RTO: 0 /100 WBC (ref 0–0.2)
PH UR STRIP.AUTO: 6.5 [PH] (ref 5–8)
PLATELET # BLD AUTO: 268 10*3/MM3 (ref 140–450)
PMV BLD AUTO: 8.6 FL (ref 6–12)
POTASSIUM SERPL-SCNC: 3.9 MMOL/L (ref 3.5–5.2)
PROT SERPL-MCNC: 6.9 G/DL (ref 6–8.5)
PROT UR QL STRIP: NEGATIVE
RBC # BLD AUTO: 4.67 10*6/MM3 (ref 3.77–5.28)
RBC # UR STRIP: ABNORMAL /HPF
REF LAB TEST METHOD: ABNORMAL
RHINOVIRUS RNA SPEC NAA+PROBE: NOT DETECTED
RSV RNA NPH QL NAA+NON-PROBE: NOT DETECTED
SARS-COV-2 RNA NPH QL NAA+NON-PROBE: NOT DETECTED
SODIUM SERPL-SCNC: 140 MMOL/L (ref 136–145)
SP GR UR STRIP: 1.01 (ref 1–1.03)
SQUAMOUS #/AREA URNS HPF: ABNORMAL /HPF
UROBILINOGEN UR QL STRIP: ABNORMAL
WBC # UR STRIP: ABNORMAL /HPF
WBC NRBC COR # BLD AUTO: 5.8 10*3/MM3 (ref 3.4–10.8)
WHOLE BLOOD HOLD COAG: NORMAL
WHOLE BLOOD HOLD SPECIMEN: NORMAL

## 2024-08-12 PROCEDURE — 0202U NFCT DS 22 TRGT SARS-COV-2: CPT | Performed by: PHYSICIAN ASSISTANT

## 2024-08-12 PROCEDURE — 96375 TX/PRO/DX INJ NEW DRUG ADDON: CPT

## 2024-08-12 PROCEDURE — 99284 EMERGENCY DEPT VISIT MOD MDM: CPT

## 2024-08-12 PROCEDURE — 83690 ASSAY OF LIPASE: CPT

## 2024-08-12 PROCEDURE — 85025 COMPLETE CBC W/AUTO DIFF WBC: CPT

## 2024-08-12 PROCEDURE — 25810000003 SODIUM CHLORIDE 0.9 % SOLUTION: Performed by: EMERGENCY MEDICINE

## 2024-08-12 PROCEDURE — 81001 URINALYSIS AUTO W/SCOPE: CPT

## 2024-08-12 PROCEDURE — 25010000002 DIPHENHYDRAMINE PER 50 MG: Performed by: PHYSICIAN ASSISTANT

## 2024-08-12 PROCEDURE — 80053 COMPREHEN METABOLIC PANEL: CPT

## 2024-08-12 PROCEDURE — 25010000002 METOCLOPRAMIDE PER 10 MG: Performed by: PHYSICIAN ASSISTANT

## 2024-08-12 PROCEDURE — 84703 CHORIONIC GONADOTROPIN ASSAY: CPT

## 2024-08-12 PROCEDURE — 36415 COLL VENOUS BLD VENIPUNCTURE: CPT | Performed by: EMERGENCY MEDICINE

## 2024-08-12 PROCEDURE — 96374 THER/PROPH/DIAG INJ IV PUSH: CPT

## 2024-08-12 PROCEDURE — 25010000002 KETOROLAC TROMETHAMINE PER 15 MG: Performed by: PHYSICIAN ASSISTANT

## 2024-08-12 PROCEDURE — 70450 CT HEAD/BRAIN W/O DYE: CPT

## 2024-08-12 RX ORDER — SODIUM CHLORIDE 0.9 % (FLUSH) 0.9 %
10 SYRINGE (ML) INJECTION AS NEEDED
Status: DISCONTINUED | OUTPATIENT
Start: 2024-08-12 | End: 2024-08-12 | Stop reason: HOSPADM

## 2024-08-12 RX ORDER — DIPHENHYDRAMINE HYDROCHLORIDE 50 MG/ML
25 INJECTION INTRAMUSCULAR; INTRAVENOUS ONCE
Status: COMPLETED | OUTPATIENT
Start: 2024-08-12 | End: 2024-08-12

## 2024-08-12 RX ORDER — METOCLOPRAMIDE HYDROCHLORIDE 5 MG/ML
10 INJECTION INTRAMUSCULAR; INTRAVENOUS ONCE
Status: COMPLETED | OUTPATIENT
Start: 2024-08-12 | End: 2024-08-12

## 2024-08-12 RX ORDER — KETOROLAC TROMETHAMINE 15 MG/ML
15 INJECTION, SOLUTION INTRAMUSCULAR; INTRAVENOUS ONCE
Status: COMPLETED | OUTPATIENT
Start: 2024-08-12 | End: 2024-08-12

## 2024-08-12 RX ADMIN — DIPHENHYDRAMINE HYDROCHLORIDE 25 MG: 50 INJECTION, SOLUTION INTRAMUSCULAR; INTRAVENOUS at 17:42

## 2024-08-12 RX ADMIN — SODIUM CHLORIDE 1000 ML: 9 INJECTION, SOLUTION INTRAVENOUS at 17:46

## 2024-08-12 RX ADMIN — METOCLOPRAMIDE 10 MG: 5 INJECTION, SOLUTION INTRAMUSCULAR; INTRAVENOUS at 17:42

## 2024-08-12 RX ADMIN — KETOROLAC TROMETHAMINE 15 MG: 15 INJECTION, SOLUTION INTRAMUSCULAR; INTRAVENOUS at 17:42

## 2024-08-12 NOTE — ED PROVIDER NOTES
EMERGENCY DEPARTMENT ENCOUNTER  Room Number:  06/06  PCP: Toyin Red APRN  Independent Historians: Patient      HPI:  Chief Complaint: had concerns including Headache.     A complete HPI/ROS/PMH/PSH/SH/FH are unobtainable due to: None    Chronic or social conditions impacting patient care (Social Determinants of Health): None      Context: Desiree Peterson is a 38 y.o. female with a medical history of migraine headaches, meningioma status post resection, ADHD, epilepsy, POTS, and hypothyroidism who presents to the ED c/o acute migraine headache.  Patient reports she woke with frontal headache today.  Reports associated photophobia and several episodes of emesis.  She denies fall or head injury.  Reports history of migraine headache, had meningioma resection in 2019.  Patient states she has felt chilled but denies objective fever.  She denies fall or head injury.  Patient denies syncope, chest pain, dyspnea, abdominal pain, vision loss, unilateral numbness/weakness, or any other systemic complaints.      Review of prior external notes (non-ED) -and- Review of prior external test results outside of this encounter:  Patient seen in office by OB/GYN on 8/9/2024 for irregular bleeding.  Reviewed assessment and plan.  Patient to continue NuvaRing and follow-up in 3 months.  Reviewed labs collected on 4/16/24.  Point-of-care hCG negative, CBC with hemoglobin 12.5.    Prescription drug monitoring program review:     N/A    PAST MEDICAL HISTORY  Active Ambulatory Problems     Diagnosis Date Noted    Paresthesia 08/02/2018    Atypical migraine 08/02/2018    Cervical radiculopathy 08/02/2018    Migraine with aura, not intractable, without status migrainosus 01/24/2017    Meningioma 01/21/2017    Epigastric pain 12/22/2020    Early satiety 12/22/2020    Heartburn 12/22/2020    Change in bowel habits 12/22/2020    Rectal bleeding 12/22/2020    Hypermobility arthralgia 09/01/2022    ADHD (attention deficit hyperactivity  disorder), inattentive type 07/24/2013    Epilepsy undetermined as to focal or generalized 12/31/2020    Hypothyroidism (acquired) 07/01/2013    POTS (postural orthostatic tachycardia syndrome) 09/01/2022    Encounter to establish care 09/01/2022    Lumbar radiculopathy 06/22/2023     Resolved Ambulatory Problems     Diagnosis Date Noted    Meningioma 03/21/2017    Hypokalemia 08/02/2018    Migraine aura without headache 08/02/2018    Cerebral meningioma 08/02/2018    Surgery follow-up examination 11/04/2019     Past Medical History:   Diagnosis Date    Abnormal menses     ADHD     Anxiety and depression     Atrial flutter     Bartholin cyst     Bipolar affective disorder, current episode mixed 11/20/2020    Brain tumor     Cervical pain (neck)     Cyst, sinus nasal     Dumping syndrome     Heart murmur     History of anemia     History of epilepsy     Hypothyroid     Mid back pain     Migraines     Panic attacks     Seizures     SOB (shortness of breath)     Syncope and collapse     Thyroid nodule     Tinnitus of both ears          PAST SURGICAL HISTORY  Past Surgical History:   Procedure Laterality Date    COLONOSCOPY N/A 1/28/2021    Procedure: COLONOSCOPY TO CECUM & T.I. WITH COLD BIOPSIES & COLD POLYPECTOMY;  Surgeon: Tomas Saini MD;  Location: [facility] ENDOSCOPY;  Service: Gastroenterology;  Laterality: N/A;  PRE- ANEMIA  POST- DIVERTICULOSIS, HEMORRHOIDS, POLYP    CRANIOTOMY FOR TUMOR Right 10/16/2019    Procedure: Right frontotemporal craniotomy for tumor;  Surgeon: Nas Coats MD;  Location: [facility] OR;  Service: Neurosurgery    ENDOSCOPY N/A 1/28/2021    Procedure: ESOPHAGOGASTRODUODENOSCOPY WITH COLD BIOPSIES, URIARTE DILATION 54FR;  Surgeon: Tomas Saini MD;  Location: [facility] ENDOSCOPY;  Service: Gastroenterology;  Laterality: N/A;  PRE-ANEMIA  POST-NORMAL    FINGER SURGERY      Cyst removal         FAMILY HISTORY  Family History   Problem Relation Age of Onset    Thyroid disease Mother      Mental illness Mother     Hypertension Mother     Cancer Mother     Arthritis Mother     Depression Mother     Anxiety disorder Mother     Heart failure Mother     Diabetes Mother     Hypertension Father     Cancer Father         Renal with metastasis    Arthritis Father     Kidney cancer Father     Migraines Father     Anxiety disorder Brother     Ulcers Brother     Mental illness Maternal Aunt     Prostate cancer Paternal Grandfather     Cervical cancer Cousin     Ovarian cancer Cousin     Thyroid disease Cousin     Malig Hyperthermia Neg Hx          SOCIAL HISTORY  Social History     Socioeconomic History    Marital status: Single    Number of children: 1    Years of education: College   Tobacco Use    Smoking status: Former     Current packs/day: 0.00     Average packs/day: 1 pack/day for 3.8 years (3.8 ttl pk-yrs)     Types: Cigarettes     Start date:      Quit date: 10/15/2019     Years since quittin.8    Smokeless tobacco: Never    Tobacco comments:     vaping    Vaping Use    Vaping status: Former   Substance and Sexual Activity    Alcohol use: No    Drug use: Not Currently     Types: Marijuana     Comment: on occasion    Sexual activity: Not Currently     Partners: Female         ALLERGIES  Amoxicillin, Cefdinir, and Penicillins      REVIEW OF SYSTEMS  Review of Systems   Constitutional:  Negative for chills and fever.   HENT:  Negative for ear pain and sore throat.    Eyes:  Positive for photophobia.   Respiratory:  Negative for cough and shortness of breath.    Cardiovascular:  Negative for chest pain and palpitations.   Gastrointestinal:  Positive for nausea and vomiting. Negative for abdominal pain.   Genitourinary:  Negative for dysuria and hematuria.   Musculoskeletal:  Negative for arthralgias and joint swelling.   Skin:  Negative for pallor and rash.   Neurological:  Positive for headaches. Negative for numbness.   Psychiatric/Behavioral:  Negative for confusion and hallucinations.       Included in HPI  All systems reviewed and negative except for those discussed in HPI.      PHYSICAL EXAM    I have reviewed the triage vital signs and nursing notes.    ED Triage Vitals   Temp Heart Rate Resp BP SpO2   08/12/24 1358 08/12/24 1358 08/12/24 1358 08/12/24 1400 08/12/24 1358   97.5 °F (36.4 °C) 82 16 119/85 97 %      Temp src Heart Rate Source Patient Position BP Location FiO2 (%)   08/12/24 1358 08/12/24 1358 08/12/24 1400 -- --   Tympanic Monitor Sitting         Physical Exam  Constitutional:       General: She is not in acute distress.     Appearance: She is well-developed.   HENT:      Head: Normocephalic and atraumatic.   Eyes:      Extraocular Movements: Extraocular movements intact.   Cardiovascular:      Rate and Rhythm: Normal rate and regular rhythm.      Heart sounds: Normal heart sounds.   Pulmonary:      Effort: Pulmonary effort is normal.      Breath sounds: Normal breath sounds.   Abdominal:      General: There is no distension.   Skin:     General: Skin is warm.   Neurological:      General: No focal deficit present.      Mental Status: She is alert and oriented to person, place, and time.   Psychiatric:         Mood and Affect: Mood normal.           LAB RESULTS  Recent Results (from the past 24 hour(s))   Comprehensive Metabolic Panel    Collection Time: 08/12/24  2:13 PM    Specimen: Arm, Left; Blood   Result Value Ref Range    Glucose 96 65 - 99 mg/dL    BUN 10 6 - 20 mg/dL    Creatinine 0.73 0.57 - 1.00 mg/dL    Sodium 140 136 - 145 mmol/L    Potassium 3.9 3.5 - 5.2 mmol/L    Chloride 104 98 - 107 mmol/L    CO2 26.4 22.0 - 29.0 mmol/L    Calcium 9.5 8.6 - 10.5 mg/dL    Total Protein 6.9 6.0 - 8.5 g/dL    Albumin 3.9 3.5 - 5.2 g/dL    ALT (SGPT) 13 1 - 33 U/L    AST (SGOT) 14 1 - 32 U/L    Alkaline Phosphatase 71 39 - 117 U/L    Total Bilirubin 0.2 0.0 - 1.2 mg/dL    Globulin 3.0 gm/dL    A/G Ratio 1.3 g/dL    BUN/Creatinine Ratio 13.7 7.0 - 25.0    Anion Gap 9.6 5.0 - 15.0  mmol/L    eGFR 108.8 >60.0 mL/min/1.73   Lipase    Collection Time: 08/12/24  2:13 PM    Specimen: Arm, Left; Blood   Result Value Ref Range    Lipase 28 13 - 60 U/L   hCG, Serum, Qualitative    Collection Time: 08/12/24  2:13 PM    Specimen: Arm, Left; Blood   Result Value Ref Range    HCG Qualitative Negative Negative   Green Top (Gel)    Collection Time: 08/12/24  2:13 PM   Result Value Ref Range    Extra Tube Hold for add-ons.    Lavender Top    Collection Time: 08/12/24  2:13 PM   Result Value Ref Range    Extra Tube hold for add-on    Light Blue Top    Collection Time: 08/12/24  2:13 PM   Result Value Ref Range    Extra Tube Hold for add-ons.    CBC Auto Differential    Collection Time: 08/12/24  2:13 PM    Specimen: Arm, Left; Blood   Result Value Ref Range    WBC 5.80 3.40 - 10.80 10*3/mm3    RBC 4.67 3.77 - 5.28 10*6/mm3    Hemoglobin 12.8 12.0 - 15.9 g/dL    Hematocrit 39.1 34.0 - 46.6 %    MCV 83.7 79.0 - 97.0 fL    MCH 27.4 26.6 - 33.0 pg    MCHC 32.7 31.5 - 35.7 g/dL    RDW 12.8 12.3 - 15.4 %    RDW-SD 38.9 37.0 - 54.0 fl    MPV 8.6 6.0 - 12.0 fL    Platelets 268 140 - 450 10*3/mm3    Neutrophil % 71.9 42.7 - 76.0 %    Lymphocyte % 21.2 19.6 - 45.3 %    Monocyte % 4.1 (L) 5.0 - 12.0 %    Eosinophil % 1.9 0.3 - 6.2 %    Basophil % 0.7 0.0 - 1.5 %    Immature Grans % 0.2 0.0 - 0.5 %    Neutrophils, Absolute 4.17 1.70 - 7.00 10*3/mm3    Lymphocytes, Absolute 1.23 0.70 - 3.10 10*3/mm3    Monocytes, Absolute 0.24 0.10 - 0.90 10*3/mm3    Eosinophils, Absolute 0.11 0.00 - 0.40 10*3/mm3    Basophils, Absolute 0.04 0.00 - 0.20 10*3/mm3    Immature Grans, Absolute 0.01 0.00 - 0.05 10*3/mm3    nRBC 0.0 0.0 - 0.2 /100 WBC   Urinalysis With Microscopic If Indicated (No Culture) - Urine, Clean Catch    Collection Time: 08/12/24  2:18 PM    Specimen: Urine, Clean Catch   Result Value Ref Range    Color, UA Dark Yellow (A) Yellow, Straw    Appearance, UA Clear Clear    pH, UA 6.5 5.0 - 8.0    Specific [place], UA 1.010  1.005 - 1.030    Glucose, UA Negative Negative    Ketones, UA Negative Negative    Bilirubin, UA Negative Negative    Blood, UA Large (3+) (A) Negative    Protein, UA Negative Negative    Leuk Esterase, UA Trace (A) Negative    Nitrite, UA Negative Negative    Urobilinogen, UA 0.2 E.U./dL 0.2 - 1.0 E.U./dL   Urinalysis, Microscopic Only - Urine, Clean Catch    Collection Time: 08/12/24  2:18 PM    Specimen: Urine, Clean Catch   Result Value Ref Range    RBC, UA Too Numerous to Count (A) None Seen, 0-2 /HPF    WBC, UA 0-2 None Seen, 0-2 /HPF    Bacteria, UA None Seen None Seen /HPF    Squamous Epithelial Cells, UA 0-2 None Seen, 0-2 /HPF    Hyaline Casts, UA None Seen None Seen /LPF    Methodology Automated Microscopy    Respiratory Panel PCR w/COVID-19(SARS-CoV-2) MIMI/AYLIN/YVONNE/PAD/COR/ARIANE In-House, NP Swab in UTM/VTM, 2 HR TAT - Swab, Nasopharynx    Collection Time: 08/12/24  4:41 PM    Specimen: Nasopharynx; Swab   Result Value Ref Range    ADENOVIRUS, PCR Not Detected Not Detected    Coronavirus 229E Not Detected Not Detected    Coronavirus HKU1 Not Detected Not Detected    Coronavirus NL63 Not Detected Not Detected    Coronavirus OC43 Not Detected Not Detected    COVID19 Not Detected Not Detected - Ref. Range    Human Metapneumovirus Not Detected Not Detected    Human Rhinovirus/Enterovirus Not Detected Not Detected    Influenza A PCR Not Detected Not Detected    Influenza B PCR Not Detected Not Detected    Parainfluenza Virus 1 Not Detected Not Detected    Parainfluenza Virus 2 Not Detected Not Detected    Parainfluenza Virus 3 Not Detected Not Detected    Parainfluenza Virus 4 Not Detected Not Detected    RSV, PCR Not Detected Not Detected    Bordetella pertussis pcr Not Detected Not Detected    Bordetella parapertussis PCR Not Detected Not Detected    Chlamydophila pneumoniae PCR Not Detected Not Detected    Mycoplasma pneumo by PCR Not Detected Not Detected         RADIOLOGY  CT Head Without Contrast    Result  Date: 8/12/2024  CT HEAD WITHOUT CONTRAST  HISTORY: Migraine since this AM. History of brain tumor.  TECHNIQUE:  Head CT includes axial imaging from the base of skull to the vertex without intravenous contrast. Radiation dose reduction techniques were utilized, including automated exposure control and exposure modulation based on body size.  COMPARISON: MRI brain with and without contrast 07/27/2023, CT head without contrast 10/17/2019.  FINDINGS: There has been previous right temporoparietal craniectomy for resection of a meningothelial meningioma. There is a small focus of chronic encephalomalacia along the lateral right frontotemporal junction deep to the craniotomy flap and this has been demonstrated on previous MRI. There are no abnormal areas of increased attenuation intra-axially to suggest hemorrhage. No extra-axial fluid collection is observed. There is no evidence for cerebral edema or mass effect or shift of the midline structures. Ventricles appear within normal limits for size and configuration. Mastoid air cells and the visualized paranasal sinuses appear clear.      Previous temporoparietal craniotomy for purposes of resection of a meningothelial meningioma. Findings are similar to the previous brain MRI 07/27/2023 without evidence to suggest acute intracranial abnormality.  This report was finalized on 8/12/2024 5:25 PM by Cayetano English M.D on Workstation: [id number]         MEDICATIONS GIVEN IN ER  Medications   sodium chloride 0.9 % flush 10 mL (has no administration in time range)   metoclopramide (REGLAN) injection 10 mg (10 mg Intravenous Given 8/12/24 1742)   diphenhydrAMINE (BENADRYL) injection 25 mg (25 mg Intravenous Given 8/12/24 1742)   ketorolac (TORADOL) injection 15 mg (15 mg Intravenous Given 8/12/24 1742)   sodium chloride 0.9 % bolus 1,000 mL (0 mL Intravenous Stopped 8/12/24 1835)         ORDERS PLACED DURING THIS VISIT:  Orders Placed This Encounter   Procedures    Respiratory  Panel PCR w/COVID-19(SARS-CoV-2) MIMI/AYLIN/YVONNE/PAD/COR/ARIANE In-House, NP Swab in UTM/VTM, 2 HR TAT - Swab, Nasopharynx    CT Head Without Contrast    [place] Draw    Comprehensive Metabolic Panel    Lipase    Urinalysis With Microscopic If Indicated (No Culture) - Urine, Clean Catch    hCG, Serum, Qualitative    CBC Auto Differential    Urinalysis, Microscopic Only - Urine, Clean Catch    NPO Diet NPO Type: Strict NPO    Undress & Gown    Insert Peripheral IV    CBC & Differential    Green Top (Gel)    Lavender Top    Light Blue Top         OUTPATIENT MEDICATION MANAGEMENT:  Current Facility-Administered Medications Ordered in Epic   Medication Dose Route Frequency Provider Last Rate Last Admin    sodium chloride 0.9 % flush 10 mL  10 mL Intravenous PRN Tae Garrido MD         Current Outpatient Medications Ordered in Epic   Medication Sig Dispense Refill    amphetamine-dextroamphetamine (Adderall) 30 MG tablet Take 1 tablet by mouth 2 (Two) Times a Day. For ADD 60 tablet 0    azithromycin (Zithromax) 250 MG tablet Take 2 tablets the first day, then 1 tablet daily for 4 days. 6 tablet 0    hydrOXYzine (ATARAX) 50 MG tablet TAKE 1 TABLET BY MOUTH DAILY 60 tablet 3    levETIRAcetam (KEPPRA) 1000 MG tablet Take 1 tablet by mouth 2 (Two) Times a Day. (Patient taking differently: Take 0.5 tablets by mouth 2 (Two) Times a Day.) 180 tablet 0    levothyroxine (SYNTHROID, LEVOTHROID) 75 MCG tablet TAKE 1 TABLET BY MOUTH DAILY 30 tablet 6    naratriptan (AMERGE) 2.5 MG tablet       propranolol (INDERAL) 10 MG tablet Take 1 tablet by mouth 2 (Two) Times a Day.      tiZANidine (ZANAFLEX) 2 MG tablet Take 1 tablet by mouth 3 (Three) Times a Day As Needed for Muscle Spasms. 90 tablet 0         PROGRESS, DATA ANALYSIS, CONSULTS, AND MEDICAL DECISION MAKING  All labs have been independently interpreted by me.  All radiology studies have been reviewed by me. All EKG's have been independently viewed and interpreted by me.   Discussion below represents my analysis of pertinent findings related to patient's condition, differential diagnosis, treatment plan and final disposition.    Differential diagnosis includes but is not limited to migraine headache, brain tumor, COVID.        ED Course as of 08/12/24 1924   Mon Aug 12, 2024   1630 WBC: 5.80 [WH]   1630 Hemoglobin: 12.8 [WH]   1825 CT scan of head independently interpreted by me as no hemorrhage [MP]   1825 Reassessed the patient.  She states she is feeling significantly improved after migraine cocktail and is ready for discharge. [MP]   1923 Patient presents to emergency department with migraine headache.  Worked up with labs, RPP, CT scan of head.  Treated with migraine cocktail.  Noted to have some hematuria although patient is currently menstruating.  CT head without acute hemorrhage.  Pain significantly improved after migraine cocktail.  Encourage patient to follow-up closely with primary care provider.  Discussed ED return precautions.  She is otherwise well-appearing, hemodynamically stable, and therefore appropriate for discharge. [MP]      ED Course User Index  [MP] May Weldon PA-C  [] Tae Garrido MD             AS OF 19:23 EDT VITALS:    BP - 128/88  HR - 61  TEMP - 97.5 °F (36.4 °C) (Tympanic)  O2 SATS - 100%    COMPLEXITY OF CARE  Admission was considered but after careful review of the patient's presentation, physical examination, diagnostic results, and response to treatment the patient may be safely discharged with outpatient follow-up.      DIAGNOSIS  Final diagnoses:   Migraine without status migrainosus, not intractable, unspecified migraine type         DISPOSITION  ED Disposition       ED Disposition   Discharge    Condition   Stable    Comment   --                Please note that portions of this document were completed with a voice recognition program.    Note Disclaimer: [facility], we believe that sharing information builds trust and  better relationships. You are receiving this note because you recently visited [facility]. It is possible you will see health information before a provider has talked with you about it. This kind of information can be easy to misunderstand. To help you fully understand what it means for your health, we urge you to discuss this note with your provider.         May Weldon PA-C  08/12/24 1924

## 2024-08-12 NOTE — ED NOTES
"   08/12/24 1740   Peripheral IV 08/12/24 1740 Anterior;Left Forearm   Placement date: If unknown, DO NOT use \"Add Comment\" note/Placement time: If unknown, DO NOT use \"Add Comment\" note: 08/12/24 1740   Hand Hygiene Completed: Yes  Size (Gauge): 20 G  Orientation: Anterior;Left  Location: Forearm  Site Prep: Chlorhexidi...   Site Assessment Clean;Dry;Intact   Dressing Type Transparent   Line Status Blood return noted;Saline locked;Flushed   Dressing Status Clean;Dry;Intact   Dressing Intervention New dressing   Phlebitis 0-->no symptoms     .Ultrasound Inserted IV Site: LFA    Catheter Length: 1.88    Diameter: 0.3    Depth: 0.5      Vascular Access Score= 5  1) Palpable / Visible / Dis  2) Palpable / Viasible / Not Distended  3) Easily Palpable / Not Visibile  4) Poorly Palpable / Visible  5) Poorly / Nonpalpable / NV    "

## 2024-08-12 NOTE — DISCHARGE INSTRUCTIONS
Follow-up with primary care provider.  Take all home medications as prescribed.  Return to emergency department for any worsening symptoms.

## 2024-08-12 NOTE — ED PROVIDER NOTES
MD ATTESTATION NOTE     SHARED VISIT: This visit was performed by BOTH a physician and an APC. The substantive portion of the medical decision making was performed by this attesting physician who made or approved the management plan and takes responsibility for patient management. All studies in the APC note (if performed) were independently interpreted by me.  The UNIQUE and I have discussed this patient's history, physical exam, and treatment plan. I have reviewed the documentation and personally had a face to face interaction with the patient. I affirm the documentation and agree with the treatment and plan. I provided a substantive portion of the care of the patient.  I personally performed the physical exam in its entirety, and below are my findings.      Brief HPI: Patient complains of a headache since last night.  Headache began gradually and was initially mild.  Headache was more severe when she woke up this morning.  She reports associated nausea, vomiting, and photophobia.  She has a history of migraines but gets them very infrequently since having a meningioma removed about 5 years ago.  Denies recent head injury, fever, chills, or numbness/tingling/weakness in her extremities.    PHYSICAL EXAM    GENERAL: Awake, alert, oriented x 3.  Well-developed, well-nourished and nontoxic-appearing female.  Resting comfortably in no acute distress  HENT: nares patent, no temporal artery tenderness  EYES: no scleral icterus, EOMI  CV: regular rhythm, normal rate  RESPIRATORY: normal effort, clear to auscultation bilaterally  ABDOMEN: soft  MUSCULOSKELETAL: no deformity  NEURO: alert, moves all extremities, follows commands  PSYCH:  calm, cooperative  SKIN: warm, dry    Vital signs and nursing notes reviewed.        Plan: Obtain labs and head CT.  Patient will be given a migraine cocktail and IV fluids.    Head CT personally interpreted by me.  My personal interpretation is: No intracranial hemorrhage.  No midline  shift    ED Course as of 08/12/24 2215   Mon Aug 12, 2024   1630 WBC: 5.80 [WH]   1630 Hemoglobin: 12.8 [WH]   1825 CT scan of head independently interpreted by me as no hemorrhage [MP]   1825 Reassessed the patient.  She states she is feeling significantly improved after migraine cocktail and is ready for discharge. [MP]   1923 Patient presents to emergency department with migraine headache.  Worked up with labs, RPP, CT scan of head.  Treated with migraine cocktail.  Noted to have some hematuria although patient is currently menstruating.  CT head without acute hemorrhage.  Pain significantly improved after migraine cocktail.  Encourage patient to follow-up closely with primary care provider.  Discussed ED return precautions.  She is otherwise well-appearing, hemodynamically stable, and therefore appropriate for discharge. [MP]      ED Course User Index  [MP] May Weldon, PARamseyC  [] Tae Garrido MD Holland, William D, MD  08/12/24 1829       Tae Garrido MD  08/12/24 2215

## 2024-11-27 ENCOUNTER — OFFICE VISIT (OUTPATIENT)
Dept: ENDOCRINOLOGY | Age: 39
End: 2024-11-27
Payer: MEDICARE

## 2024-11-27 VITALS
DIASTOLIC BLOOD PRESSURE: 80 MMHG | HEART RATE: 72 BPM | WEIGHT: 223 LBS | OXYGEN SATURATION: 100 % | SYSTOLIC BLOOD PRESSURE: 124 MMHG | BODY MASS INDEX: 33.8 KG/M2 | HEIGHT: 68 IN

## 2024-11-27 DIAGNOSIS — E03.9 HYPOTHYROIDISM, UNSPECIFIED TYPE: ICD-10-CM

## 2024-11-27 DIAGNOSIS — N92.6 IRREGULAR PERIODS/MENSTRUAL CYCLES: Primary | ICD-10-CM

## 2024-11-27 DIAGNOSIS — E04.2 MULTIPLE THYROID NODULES: ICD-10-CM

## 2024-11-27 PROCEDURE — 1159F MED LIST DOCD IN RCRD: CPT | Performed by: INTERNAL MEDICINE

## 2024-11-27 PROCEDURE — 99214 OFFICE O/P EST MOD 30 MIN: CPT | Performed by: INTERNAL MEDICINE

## 2024-11-27 PROCEDURE — 1160F RVW MEDS BY RX/DR IN RCRD: CPT | Performed by: INTERNAL MEDICINE

## 2024-11-27 RX ORDER — FOLIC ACID 1 MG/1
2000 TABLET ORAL DAILY
COMMUNITY
Start: 2024-11-11

## 2024-11-27 RX ORDER — PANTOPRAZOLE SODIUM 40 MG/1
40 TABLET, DELAYED RELEASE ORAL DAILY
COMMUNITY
Start: 2024-07-30

## 2024-11-27 NOTE — PROGRESS NOTES
Chief complaint/Reason for consult: hypothyroidism    HPI:   - 38 year old female here for hypothyroidism  - Is currently on levothyroxine 75 mcg daily  - Denies missing any does of her levothyroxine  - She takes her levothyroxine at the same time every day, on an empty stomach, and not with hot beverages  - Complains of fatigue, weight gain, irregular menstrual cycles, acne      The following portions of the patient's history were reviewed and updated as appropriate: allergies, current medications, past family history, past medical history, past social history, past surgical history, and problem list.      Objective     Vitals:    11/27/24 1240   BP: 124/80   Pulse: 72   SpO2: 100%        Physical Exam  Vitals reviewed.   Constitutional:       Appearance: Normal appearance.   HENT:      Head: Normocephalic and atraumatic.   Eyes:      General: No scleral icterus.  Pulmonary:      Effort: Pulmonary effort is normal. No respiratory distress.   Neurological:      Mental Status: She is alert.      Gait: Gait normal.   Psychiatric:         Mood and Affect: Mood normal.         Behavior: Behavior normal.         Thought Content: Thought content normal.         Judgment: Judgment normal.         Assessment & Plan   Hypothyroidism  - Will check TSH and free T4  - Currently on levothyroxine 75 mcg daily    2. Irregular menstrual cycles  - Will order lab evaluation    3. Thyroid nodules  - She had an ultrasound in 5/2024 that showed her nodules were stable    - Return to clinic in 1 year

## 2024-12-03 LAB
17OHP SERPL-MCNC: 52 NG/DL
DHEA-S SERPL-MCNC: 101 UG/DL (ref 57.3–279.2)
ESTRADIOL SERPL-MCNC: 34.2 PG/ML
FSH SERPL-ACNC: 4.3 MIU/ML
LH SERPL-ACNC: 6.4 MIU/ML
PROLACTIN SERPL-MCNC: 12.5 NG/ML (ref 4.8–33.4)
T4 FREE SERPL-MCNC: 1.49 NG/DL (ref 0.82–1.77)
TESTOST SERPL-MCNC: 8 NG/DL (ref 8–60)
TSH SERPL DL<=0.005 MIU/L-ACNC: 0.56 UIU/ML (ref 0.45–4.5)

## 2025-06-07 NOTE — TELEPHONE ENCOUNTER
Caller: CoynejamarDesiree    Relationship: Self    Best call back number: [phone]    What was the call regarding: PATIENT HAS SPOKEN WITH HER INSURANCE AND HAS INFORMATION ABOUT ORDERS AND REFERRALS SHE IS NEEDING.     PATIENT NEEDS ORDERS FOR [facility] FOR IN HOME PHYSICAL THERAPY - THEY WILL EVALUATE HER FOR WHAT ELSE SHE NEEDS AND MAY BE ABLE TO HELP WITH HER IV THERAPY AND INFUSIONS. [facility] FAX NUMBER: [phone]    PATIENT STATED SHE NEEDS A REFERRAL TO A NUTRITIONIST, WHICH MAY BE HANDLED THROUGH A GI DOCTOR, WHICH SHE ALSO NEEDS A REFERRAL TO FOR GASTROPARESIS    PATIENT STATED SHE MAY NEED A REFERRAL OR PRESCRIPTION FOR A MED MANAGEMENT MACHINE     PATIENT IS ALSO ASKING FOR A RHEUMATOLOGIST REFERRAL.     PATIENT WOULD ALSO LIKE A BLOOD WORK UP FOR CLOTTING FACTORS. PATIENT STATED SHE IS BRUISING EASILY AND IF SHE GETS SCRATCHED, SHE BLEEDS AND BLEEDS.     PATIENT STATED SHE IS GETTING SET UP FOR [employer], WHICH WORKS WITH MEDICA[employer] WAIVER SERVICES AND MEDICARE. PATIENT WAS NOT SURE IF SHE WOULD NEED A REFERRAL FOR THAT.       PLEASE ADVISE.    Do you require a callback: YES           No

## (undated) DEVICE — ANTIBACTERIAL UNDYED BRAIDED (POLYGLACTIN 910), SYNTHETIC ABSORBABLE SUTURE: Brand: COATED VICRYL

## (undated) DEVICE — SOL IRR PHYSIOSOL BTL 1000ML

## (undated) DEVICE — APPL CHLORAPREP W/TINT 10.5ML PERC STRL

## (undated) DEVICE — PERFORATOR CDM WO/ DURAGUARD 14/11

## (undated) DEVICE — PAD,NON-ADHERENT,2X3,STERILE,LF,1/PK: Brand: MEDLINE

## (undated) DEVICE — CVR PROB 96IN LF STRL

## (undated) DEVICE — SMOKE EVACUATION TUBING WITH 7/8 IN TO 1/4 IN REDUCER: Brand: BUFFALO FILTER

## (undated) DEVICE — SENSR O2 OXIMAX FNGR A/ 18IN NONSTR

## (undated) DEVICE — THE TORRENT IRRIGATION SCOPE CONNECTOR IS USED WITH THE TORRENT IRRIGATION TUBING TO PROVIDE IRRIGATION FLUIDS SUCH AS STERILE WATER DURING GASTROINTESTINAL ENDOSCOPIC PROCEDURES WHEN USED IN CONJUNCTION WITH AN IRRIGATION PUMP (OR ELECTROSURGICAL UNIT).: Brand: TORRENT

## (undated) DEVICE — MARKR SKIN W/RULR AND LBL

## (undated) DEVICE — BITEBLOCK OMNI BLOC

## (undated) DEVICE — GLV SURG BIOGEL LTX PF 7

## (undated) DEVICE — GLV SURG SENSICARE W/ALOE PF LF 7.5 STRL

## (undated) DEVICE — KT ORCA ORCAPOD DISP STRL

## (undated) DEVICE — TUBING, SUCTION, 1/4" X 10', STRAIGHT: Brand: MEDLINE

## (undated) DEVICE — DRP MICROSCOPE 4 BINOCULAR CV 54X150IN

## (undated) DEVICE — DRSNG WND GZ PAD BORDERED 4X8IN STRL

## (undated) DEVICE — LN SMPL CO2 SHTRM SD STREAM W/M LUER

## (undated) DEVICE — DISPOSABLE IRRIGATION BIPOLAR CORD, M1000 TYPE: Brand: KIRWAN

## (undated) DEVICE — DRSNG TELFA PAD NONADH STR 1S 3X8IN

## (undated) DEVICE — SUT NUROLON 4/0 TF18 CR8 I8IN C584D

## (undated) DEVICE — 2.3MM TAPERED ROUTER

## (undated) DEVICE — NDL HYPO PRECISIONGLIDE REG 20G 1 1/2

## (undated) DEVICE — ADHS SKIN DERMABOND TOP ADVANCED

## (undated) DEVICE — CANN O2 ETCO2 FITS ALL CONN CO2 SMPL A/ 7IN DISP LF

## (undated) DEVICE — SPHR MARKR STEALTH STATION

## (undated) DEVICE — CONN TBG Y 5 IN 1 LF STRL

## (undated) DEVICE — PK CRANI 40

## (undated) DEVICE — SINGLE-USE BIOPSY FORCEPS: Brand: RADIAL JAW 4

## (undated) DEVICE — DURAHOOK 1/4HOOK PK/6

## (undated) DEVICE — ADAPT CLN BIOGUARD AIR/H2O DISP

## (undated) DEVICE — TUBING, SUCTION, 1/4" X 20', STRAIGHT: Brand: MEDLINE INDUSTRIES, INC.

## (undated) DEVICE — SEAL DURL ADHERUS/AUTOSPRAY HYDROGEL DS

## (undated) DEVICE — Device

## (undated) DEVICE — MAYFIELD® DISPOSABLE ADULT SKULL PIN (PLASTIC BASE): Brand: MAYFIELD®